# Patient Record
Sex: FEMALE | Race: WHITE | HISPANIC OR LATINO | Employment: PART TIME | URBAN - METROPOLITAN AREA
[De-identification: names, ages, dates, MRNs, and addresses within clinical notes are randomized per-mention and may not be internally consistent; named-entity substitution may affect disease eponyms.]

---
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
## 2022-04-23 ENCOUNTER — APPOINTMENT (EMERGENCY)
Dept: RADIOLOGY | Facility: HOSPITAL | Age: 22
End: 2022-04-23
Payer: COMMERCIAL

## 2022-04-23 ENCOUNTER — HOSPITAL ENCOUNTER (EMERGENCY)
Facility: HOSPITAL | Age: 22
Discharge: HOME | End: 2022-04-23
Attending: EMERGENCY MEDICINE | Admitting: EMERGENCY MEDICINE
Payer: COMMERCIAL

## 2022-04-23 VITALS
HEART RATE: 96 BPM | BODY MASS INDEX: 22.49 KG/M2 | OXYGEN SATURATION: 98 % | HEIGHT: 65 IN | RESPIRATION RATE: 16 BRPM | TEMPERATURE: 98 F | WEIGHT: 135 LBS | DIASTOLIC BLOOD PRESSURE: 66 MMHG | SYSTOLIC BLOOD PRESSURE: 120 MMHG

## 2022-04-23 DIAGNOSIS — J11.1 INFLUENZA: Primary | ICD-10-CM

## 2022-04-23 DIAGNOSIS — J45.909 ACUTE ASTHMATIC BRONCHITIS: ICD-10-CM

## 2022-04-23 LAB
EXT PREG TEST URINE: NEGATIVE
EXT. CONTROL ED NAV: NORMAL
FLUAV RNA RESP QL NAA+PROBE: POSITIVE
FLUBV RNA RESP QL NAA+PROBE: NEGATIVE
RSV RNA RESP QL NAA+PROBE: NEGATIVE
SARS-COV-2 RNA RESP QL NAA+PROBE: NEGATIVE

## 2022-04-23 PROCEDURE — 71045 X-RAY EXAM CHEST 1 VIEW: CPT

## 2022-04-23 PROCEDURE — 0241U HB NFCT DS VIR RESP RNA 4 TRGT: CPT | Performed by: STUDENT IN AN ORGANIZED HEALTH CARE EDUCATION/TRAINING PROGRAM

## 2022-04-23 PROCEDURE — 99284 EMERGENCY DEPT VISIT MOD MDM: CPT | Performed by: STUDENT IN AN ORGANIZED HEALTH CARE EDUCATION/TRAINING PROGRAM

## 2022-04-23 PROCEDURE — 81025 URINE PREGNANCY TEST: CPT | Performed by: STUDENT IN AN ORGANIZED HEALTH CARE EDUCATION/TRAINING PROGRAM

## 2022-04-23 PROCEDURE — 99284 EMERGENCY DEPT VISIT MOD MDM: CPT

## 2022-04-23 RX ORDER — ALBUTEROL SULFATE 2.5 MG/3ML
2.5 SOLUTION RESPIRATORY (INHALATION) EVERY 6 HOURS PRN
Qty: 75 ML | Refills: 0 | Status: SHIPPED | OUTPATIENT
Start: 2022-04-23

## 2022-04-23 RX ORDER — ALBUTEROL SULFATE 2.5 MG/3ML
2.5 SOLUTION RESPIRATORY (INHALATION) EVERY 6 HOURS PRN
Qty: 75 ML | Refills: 0 | Status: SHIPPED | OUTPATIENT
Start: 2022-04-23 | End: 2022-04-23 | Stop reason: SDUPTHER

## 2022-04-23 RX ORDER — IBUPROFEN 600 MG/1
600 TABLET ORAL ONCE
Status: COMPLETED | OUTPATIENT
Start: 2022-04-23 | End: 2022-04-23

## 2022-04-23 RX ORDER — ONDANSETRON 4 MG/1
4 TABLET, ORALLY DISINTEGRATING ORAL ONCE
Status: COMPLETED | OUTPATIENT
Start: 2022-04-23 | End: 2022-04-23

## 2022-04-23 RX ORDER — ONDANSETRON 4 MG/1
4 TABLET, ORALLY DISINTEGRATING ORAL EVERY 6 HOURS PRN
Qty: 5 TABLET | Refills: 0 | Status: SHIPPED | OUTPATIENT
Start: 2022-04-23 | End: 2022-04-26

## 2022-04-23 RX ADMIN — ONDANSETRON 4 MG: 4 TABLET, ORALLY DISINTEGRATING ORAL at 06:42

## 2022-04-23 RX ADMIN — IBUPROFEN 600 MG: 600 TABLET ORAL at 07:05

## 2022-04-23 NOTE — ED PROVIDER NOTES
History  Chief Complaint   Patient presents with    Flu Symptoms     Patient presents with vomiting and cough for the past few days, [facility] but she states that she does not feel better     Patient 51-year-old female presenting to the emergency department today with concern for cough, shortness of breath, vomiting  Patient's over the past few days she has been experiencing tightness in her chest and worsening cough, had been using her nebulizer and albuterol inhaler without symptomatic relief  She denies production of cough but states her chest is feeling increasingly tight  She also states over the past 4 hours she began with multiple episodes of vomiting, denies presence of blood or bile, having difficulty tolerating solids or liquids  She also admits to associated muscle and body aches and fevers with a T-max of 101°, denies taking any Tylenol or ibuprofen  Denies chest pain, lightheadedness or dizziness, sore throat, nasal congestion or rhinorrhea, abdominal pain, diarrhea, neck or back pain, headache  Prior to Admission Medications   Prescriptions Last Dose Informant Patient Reported? Taking? albuterol (2 5 mg/3 mL) 0 083 % nebulizer solution   No No   Sig: Take 1 vial (2 5 mg total) by nebulization every 6 (six) hours as needed for wheezing or shortness of breath   albuterol (2 5 mg/3 mL) 0 083 % nebulizer solution   No No   Sig: Take 3 mL (2 5 mg total) by nebulization every 6 (six) hours as needed for wheezing or shortness of breath   albuterol (PROVENTIL HFA,VENTOLIN HFA) 90 mcg/act inhaler   No No   Sig: Inhale 2 puffs every 4 (four) hours as needed for wheezing      Facility-Administered Medications: None       Past Medical History:   Diagnosis Date    Asthma        History reviewed  No pertinent surgical history  History reviewed  No pertinent family history  I have reviewed and agree with the history as documented      E-Cigarette/Vaping E-Cigarette/Vaping Substances     Social History     Tobacco Use    Smoking status: Current Every Day Smoker     Packs/day: 0 25    Smokeless tobacco: Never Used   Substance Use Topics    Alcohol use: Yes     Comment: socially    Drug use: Yes     Types: Marijuana       Review of Systems   Constitutional: Positive for fatigue  Negative for chills  HENT: Negative for congestion, rhinorrhea and sore throat  Respiratory: Positive for cough, chest tightness and shortness of breath  Cardiovascular: Negative for chest pain  Gastrointestinal: Positive for nausea and vomiting  Negative for abdominal pain and diarrhea  Genitourinary: Negative  Musculoskeletal: Positive for myalgias  Negative for back pain and neck pain  Neurological: Negative for dizziness, weakness, light-headedness, numbness and headaches  All other systems reviewed and are negative  Physical Exam  Physical Exam  Vitals and nursing note reviewed  Constitutional:       General: She is not in acute distress  Appearance: Normal appearance  She is well-developed  She is not ill-appearing  Comments: Well-appearing female, not acute distress   HENT:      Head: Normocephalic and atraumatic  Nose: Nose normal  No congestion or rhinorrhea  Mouth/Throat:      Mouth: Mucous membranes are moist       Pharynx: Oropharynx is clear  No oropharyngeal exudate or posterior oropharyngeal erythema  Tonsils: No tonsillar exudate or tonsillar abscesses  Eyes:      Extraocular Movements: Extraocular movements intact  Conjunctiva/sclera: Conjunctivae normal       Pupils: Pupils are equal, round, and reactive to light  Cardiovascular:      Rate and Rhythm: Normal rate and regular rhythm  Heart sounds: Normal heart sounds  No murmur heard  Pulmonary:      Effort: Pulmonary effort is normal  No respiratory distress  Breath sounds: Normal breath sounds     Abdominal:      General: Bowel sounds are normal       Palpations: Abdomen is soft  Tenderness: There is no abdominal tenderness  There is no guarding or rebound  Negative signs include Kingsley's sign and McBurney's sign  Musculoskeletal:      Cervical back: Neck supple  No tenderness  No pain with movement, spinous process tenderness or muscular tenderness  Lymphadenopathy:      Cervical: No cervical adenopathy  Skin:     General: Skin is warm and dry  Neurological:      General: No focal deficit present  Mental Status: She is alert and oriented to person, place, and time  Sensory: Sensation is intact  Motor: Motor function is intact  Psychiatric:         Attention and Perception: Attention normal          Mood and Affect: Mood normal          Speech: Speech normal          Behavior: Behavior normal  Behavior is cooperative  Thought Content:  Thought content normal          Judgment: Judgment normal          Vital Signs  ED Triage Vitals [04/23/22 0627]   Temperature Pulse Respirations Blood Pressure SpO2   98 °F (36 7 °C) 96 16 120/66 98 %      Temp Source Heart Rate Source Patient Position - Orthostatic VS BP Location FiO2 (%)   Oral Monitor Lying Left arm --      Pain Score       7           Vitals:    04/23/22 0627   BP: 120/66   Pulse: 96   Patient Position - Orthostatic VS: Lying         Visual Acuity      ED Medications  Medications   ibuprofen (MOTRIN) tablet 600 mg (600 mg Oral Given 4/23/22 0705)   ondansetron (ZOFRAN-ODT) dispersible tablet 4 mg (4 mg Oral Given 4/23/22 7421)       Diagnostic Studies  Results Reviewed     Procedure Component Value Units Date/Time    COVID/FLU/RSV - 2 hour TAT [[id number]]  (Abnormal) Collected: 04/23/22 0644    Lab Status: Final result Specimen: Nasopharyngeal Swab Updated: 04/23/22 0725     SARS-CoV-2 Negative     INFLUENZA A PCR Positive     INFLUENZA B PCR Negative     RSV PCR Negative    Narrative:      FOR PEDIATRIC PATIENTS - copy/paste COVID Guidelines URL to browser: [email or web address]  [email or web address]    SARS-CoV-2 assay is a Nucleic Acid Amplification assay intended for the  qualitative detection of nucleic acid from SARS-CoV-2 in nasopharyngeal  swabs  Results are for the presumptive identification of SARS-CoV-2 RNA  Positive results are indicative of infection with SARS-CoV-2, the virus  causing COVID-19, but do not rule out bacterial infection or co-infection  with other viruses  Laboratories within the [place] and its  territories are required to report all positive results to the appropriate  public health authorities  Negative results do not preclude SARS-CoV-2  infection and should not be used as the sole basis for treatment or other  patient management decisions  Negative results must be combined with  clinical observations, patient history, and epidemiological information  This test has not been FDA cleared or approved  This test has been authorized by FDA under an Emergency Use Authorization  (EUA)  This test is only authorized for the duration of time the  declaration that circumstances exist justifying the authorization of the  emergency use of an in vitro diagnostic tests for detection of SARS-CoV-2  virus and/or diagnosis of COVID-19 infection under section 564(b)(1) of  the Act, 21 U  S C  016EBY-9(T)(9), unless the authorization is terminated  or revoked sooner  The test has been validated but independent review by FDA  and CLIA is pending  Test performed using [employer] Gene[employer]pert: This RT-PCR assay targets N2,  a region unique to SARS-CoV-2  A conserved region in the E-gene was chosen  for pan-Sarbecovirus detection which includes SARS-CoV-2      POCT pregnancy, urine [[id number]]  (Normal) Resulted: 04/23/22 0649    Lab Status: Final result Updated: 04/23/22 0649     EXT PREG TEST UR (Ref: Negative) Negative     Control Valid                 XR chest 1 view portable   ED Interpretation by Collette Dumont PA-C (04/23 7354)   No acute cardiopulmonary abnormalities                 Procedures  Procedures         ED Course                                             MDM  Number of Diagnoses or Management Options  Influenza  Diagnosis management comments: Patient 75-year-old female presents emergency department today with complaint of cough, shortness of breath, vomiting  Examination was unremarkable, lungs are clear to auscultation bilaterally without wheezing, heart was regular rate and rhythm, abdomen is soft and nontender  COVID/flu/RSV testing was ordered in the setting of global pandemic and due to patient's presenting symptoms was positive for influenza A  Patient medicated with Zofran for nausea and ibuprofen for symptomatic relief as patient cannot take Tylenol  Urine preg test ordered and was negative  Chest x-ray ordered due to patient's complaint of cough and chest tightness/shortness of breath to rule out pneumonia and showed no acute cardiopulmonary abnormalities  Patient advised follow-up primary care within the next week for re-evaluation return emergency department any new or worsening symptoms as discussed with the patient  Patient verbalized understanding and agreement plan of care, all patient's questions were answered, patient stable at time of discharge         Amount and/or Complexity of Data Reviewed  Clinical lab tests: ordered  Tests in the radiology section of CPT®: ordered and reviewed  Independent visualization of images, tracings, or specimens: yes    Patient Progress  Patient progress: stable      Disposition  Final diagnoses:   Influenza     Time reflects when diagnosis was documented in both MDM as applicable and the Disposition within this note     Time User Action Codes Description Comment    4/23/2022  7:28 AM Kashif Keith [J11 1] Influenza     4/23/2022  7:28 SHANA Keith [J45 909] Acute asthmatic bronchitis       ED Disposition     ED Disposition Condition Date/Time Comment    Discharge Stable Sat Apr 23, 2022  7:28 AM Kranthi Guerra discharge to home/self care  Follow-up Information     Follow up With Specialties Details Why Contact Info Additional Information    [facility] [address] [facility]   [address]  [address] [facility] [address]  [facility] [address] [facility], [address], [address], [address] [facility]  Emergency Department Emergency Medicine   [address] [facility],[address] [address]  [facility] Emergency Department, [address], [place] [address]          Patient's Medications   Discharge Prescriptions    ONDANSETRON (ZOFRAN ODT) 4 MG DISINTEGRATING TABLET    Take 1 tablet (4 mg total) by mouth every 6 (six) hours as needed for nausea or vomiting for up to 3 days       Start Date: 4/23/2022 End Date: 4/26/2022       Order Dose: 4 mg       Quantity: 5 tablet    Refills: 0       No discharge procedures on file      PDMP Review     None          ED Provider  Electronically Signed by           Josué James PA-C  04/23/22 0894

## 2022-04-23 NOTE — DISCHARGE INSTRUCTIONS
May use Zofran every 6 hours as needed for nausea  Continue ibuprofen every 6 hours as needed for symptomatic relief and fever reduction  Follow-up with primary care provider within next week for re-evaluation as needed  Return emergency department any new or worsening symptoms including fevers greater than 103 that were not responding to buprofen, chest pain, worsening shortness of breath, lightheadedness or dizziness, passing out, uncontrolled nausea or vomiting, vomiting blood

## 2022-04-24 ENCOUNTER — HOSPITAL ENCOUNTER (EMERGENCY)
Facility: HOSPITAL | Age: 22
Discharge: HOME | End: 2022-04-24
Attending: EMERGENCY MEDICINE | Admitting: EMERGENCY MEDICINE
Payer: COMMERCIAL

## 2022-04-24 ENCOUNTER — APPOINTMENT (EMERGENCY)
Dept: RADIOLOGY | Facility: HOSPITAL | Age: 22
End: 2022-04-24
Payer: COMMERCIAL

## 2022-04-24 VITALS
WEIGHT: 135 LBS | SYSTOLIC BLOOD PRESSURE: 107 MMHG | DIASTOLIC BLOOD PRESSURE: 69 MMHG | RESPIRATION RATE: 16 BRPM | TEMPERATURE: 99.5 F | BODY MASS INDEX: 22.47 KG/M2 | HEART RATE: 101 BPM

## 2022-04-24 DIAGNOSIS — R11.10 VOMITING: ICD-10-CM

## 2022-04-24 DIAGNOSIS — E86.0 DEHYDRATION: ICD-10-CM

## 2022-04-24 DIAGNOSIS — J11.1 INFLUENZA: ICD-10-CM

## 2022-04-24 DIAGNOSIS — J45.909 ASTHMA: Primary | ICD-10-CM

## 2022-04-24 LAB
ALBUMIN SERPL BCP-MCNC: 4.9 G/DL (ref 3.5–5)
ALP SERPL-CCNC: 51 U/L (ref 34–104)
ALT SERPL W P-5'-P-CCNC: 25 U/L (ref 7–52)
ANION GAP SERPL CALCULATED.3IONS-SCNC: 11 MMOL/L (ref 4–13)
AST SERPL W P-5'-P-CCNC: 32 U/L (ref 13–39)
BACTERIA UR QL AUTO: ABNORMAL /HPF
BASOPHILS # BLD AUTO: 0.03 THOUSANDS/ΜL (ref 0–0.1)
BASOPHILS NFR BLD AUTO: 1 % (ref 0–1)
BILIRUB SERPL-MCNC: 0.26 MG/DL (ref 0.2–1)
BILIRUB UR QL STRIP: ABNORMAL
BUN SERPL-MCNC: 9 MG/DL (ref 5–25)
CALCIUM SERPL-MCNC: 9.4 MG/DL (ref 8.4–10.2)
CHLORIDE SERPL-SCNC: 100 MMOL/L (ref 96–108)
CLARITY UR: ABNORMAL
CO2 SERPL-SCNC: 24 MMOL/L (ref 21–32)
COLOR UR: YELLOW
CREAT SERPL-MCNC: 0.7 MG/DL (ref 0.6–1.3)
EOSINOPHIL # BLD AUTO: 0.01 THOUSAND/ΜL (ref 0–0.61)
EOSINOPHIL NFR BLD AUTO: 0 % (ref 0–6)
ERYTHROCYTE [DISTWIDTH] IN BLOOD BY AUTOMATED COUNT: 13.7 % (ref 11.6–15.1)
EXT PREG TEST URINE: NEGATIVE
EXT. CONTROL ED NAV: NORMAL
GFR SERPL CREATININE-BSD FRML MDRD: 124 ML/MIN/1.73SQ M
GLUCOSE SERPL-MCNC: 83 MG/DL (ref 65–140)
GLUCOSE UR STRIP-MCNC: NEGATIVE MG/DL
HCT VFR BLD AUTO: 42.4 % (ref 34.8–46.1)
HGB BLD-MCNC: 14.1 G/DL (ref 11.5–15.4)
HGB UR QL STRIP.AUTO: ABNORMAL
IMM GRANULOCYTES # BLD AUTO: 0.02 THOUSAND/UL (ref 0–0.2)
IMM GRANULOCYTES NFR BLD AUTO: 0 % (ref 0–2)
KETONES UR STRIP-MCNC: ABNORMAL MG/DL
LEUKOCYTE ESTERASE UR QL STRIP: NEGATIVE
LIPASE SERPL-CCNC: 16 U/L (ref 11–82)
LYMPHOCYTES # BLD AUTO: 0.71 THOUSANDS/ΜL (ref 0.6–4.47)
LYMPHOCYTES NFR BLD AUTO: 11 % (ref 14–44)
MCH RBC QN AUTO: 28.3 PG (ref 26.8–34.3)
MCHC RBC AUTO-ENTMCNC: 33.3 G/DL (ref 31.4–37.4)
MCV RBC AUTO: 85 FL (ref 82–98)
MONOCYTES # BLD AUTO: 0.62 THOUSAND/ΜL (ref 0.17–1.22)
MONOCYTES NFR BLD AUTO: 9 % (ref 4–12)
MUCOUS THREADS UR QL AUTO: ABNORMAL
NEUTROPHILS # BLD AUTO: 5.18 THOUSANDS/ΜL (ref 1.85–7.62)
NEUTS SEG NFR BLD AUTO: 79 % (ref 43–75)
NITRITE UR QL STRIP: NEGATIVE
NON-SQ EPI CELLS URNS QL MICRO: ABNORMAL /HPF
NRBC BLD AUTO-RTO: 0 /100 WBCS
PH UR STRIP.AUTO: 6 [PH]
PLATELET # BLD AUTO: 218 THOUSANDS/UL (ref 149–390)
PMV BLD AUTO: 9.7 FL (ref 8.9–12.7)
POTASSIUM SERPL-SCNC: 3.6 MMOL/L (ref 3.5–5.3)
PROT SERPL-MCNC: 8.1 G/DL (ref 6.4–8.4)
PROT UR STRIP-MCNC: ABNORMAL MG/DL
RBC # BLD AUTO: 4.98 MILLION/UL (ref 3.81–5.12)
RBC #/AREA URNS AUTO: ABNORMAL /HPF
SODIUM SERPL-SCNC: 135 MMOL/L (ref 135–147)
SP GR UR STRIP.AUTO: >=1.03 (ref 1–1.03)
UROBILINOGEN UR QL STRIP.AUTO: 0.2 E.U./DL
WBC # BLD AUTO: 6.57 THOUSAND/UL (ref 4.31–10.16)
WBC #/AREA URNS AUTO: ABNORMAL /HPF

## 2022-04-24 PROCEDURE — 83690 ASSAY OF LIPASE: CPT | Performed by: EMERGENCY MEDICINE

## 2022-04-24 PROCEDURE — 96374 THER/PROPH/DIAG INJ IV PUSH: CPT

## 2022-04-24 PROCEDURE — 36415 COLL VENOUS BLD VENIPUNCTURE: CPT | Performed by: EMERGENCY MEDICINE

## 2022-04-24 PROCEDURE — 85025 COMPLETE CBC W/AUTO DIFF WBC: CPT | Performed by: EMERGENCY MEDICINE

## 2022-04-24 PROCEDURE — 99284 EMERGENCY DEPT VISIT MOD MDM: CPT | Performed by: EMERGENCY MEDICINE

## 2022-04-24 PROCEDURE — 94640 AIRWAY INHALATION TREATMENT: CPT

## 2022-04-24 PROCEDURE — 99284 EMERGENCY DEPT VISIT MOD MDM: CPT

## 2022-04-24 PROCEDURE — 81003 URINALYSIS AUTO W/O SCOPE: CPT | Performed by: EMERGENCY MEDICINE

## 2022-04-24 PROCEDURE — 96361 HYDRATE IV INFUSION ADD-ON: CPT

## 2022-04-24 PROCEDURE — 81025 URINE PREGNANCY TEST: CPT | Performed by: EMERGENCY MEDICINE

## 2022-04-24 PROCEDURE — 71045 X-RAY EXAM CHEST 1 VIEW: CPT

## 2022-04-24 PROCEDURE — 81001 URINALYSIS AUTO W/SCOPE: CPT | Performed by: EMERGENCY MEDICINE

## 2022-04-24 PROCEDURE — 80053 COMPREHEN METABOLIC PANEL: CPT | Performed by: EMERGENCY MEDICINE

## 2022-04-24 RX ORDER — PREDNISONE 20 MG/1
40 TABLET ORAL ONCE
Status: COMPLETED | OUTPATIENT
Start: 2022-04-24 | End: 2022-04-24

## 2022-04-24 RX ORDER — METHYLPREDNISOLONE 4 MG/1
TABLET ORAL
Qty: 21 TABLET | Refills: 0 | Status: SHIPPED | OUTPATIENT
Start: 2022-04-24

## 2022-04-24 RX ORDER — ALBUTEROL SULFATE 2.5 MG/3ML
2.5 SOLUTION RESPIRATORY (INHALATION) ONCE
Status: COMPLETED | OUTPATIENT
Start: 2022-04-24 | End: 2022-04-24

## 2022-04-24 RX ORDER — ONDANSETRON 2 MG/ML
4 INJECTION INTRAMUSCULAR; INTRAVENOUS ONCE
Status: COMPLETED | OUTPATIENT
Start: 2022-04-24 | End: 2022-04-24

## 2022-04-24 RX ORDER — ONDANSETRON 4 MG/1
4 TABLET, FILM COATED ORAL EVERY 6 HOURS
Qty: 12 TABLET | Refills: 0 | Status: SHIPPED | OUTPATIENT
Start: 2022-04-24

## 2022-04-24 RX ADMIN — SODIUM CHLORIDE 1000 ML: 0.9 INJECTION, SOLUTION INTRAVENOUS at 10:06

## 2022-04-24 RX ADMIN — SODIUM CHLORIDE 1000 ML: 0.9 INJECTION, SOLUTION INTRAVENOUS at 09:12

## 2022-04-24 RX ADMIN — ALBUTEROL SULFATE 2.5 MG: 2.5 SOLUTION RESPIRATORY (INHALATION) at 10:32

## 2022-04-24 RX ADMIN — ONDANSETRON 4 MG: 2 INJECTION INTRAMUSCULAR; INTRAVENOUS at 09:18

## 2022-04-24 RX ADMIN — PREDNISONE 40 MG: 20 TABLET ORAL at 10:05

## 2022-04-24 NOTE — ED NOTES
Patient states that she feels like she is wheezing  Requesting a nebulizer treatment  MD  Made aware        [address] Chetan Romero, RN  04/24/22 7811

## 2022-04-24 NOTE — ED NOTES
Discharge instructions reviewed with pt  Pt verbalized understanding  And has no further questions at this time       Ciro Lizarraga RN  04/24/22 1039

## 2022-04-24 NOTE — DISCHARGE INSTRUCTIONS
Return to er with return of symptoms, concern for return of dehydration, if you have worse ing asthma symptoms or new symptoms  See pcp this week for follow up and repeat evaluation

## 2022-04-24 NOTE — ED PROVIDER NOTES
History  Chief Complaint   Patient presents with    Vomiting     pt presents to ED with c/o persistant vomiting since being dx with flu 5 days ago, pt concerned as she can't keep anything down  To er with flu sx for 5 days, was more resp and coughing and now more persistent vomiting  She states she was seen 2 days ago, dx with influenza  At that time her asthma was flairing, she was using q 4-6 hr nebs at home  From a resp standpoint she reported she is improved but states she does feel her asthma is still flairing some  No cp  No legs welling, orthopnea is reported  She states she has been vomiting for the last 3 days, difficulty tolerating po  abd pain is reported, seems to [facility] and  and is reported as cramps  Denies localized and constant pain to her abd  No bld in V  No D  Prior to Admission Medications   Prescriptions Last Dose Informant Patient Reported? Taking? albuterol (2 5 mg/3 mL) 0 083 % nebulizer solution   No No   Sig: Take 3 mL (2 5 mg total) by nebulization every 6 (six) hours as needed for wheezing or shortness of breath   albuterol (PROVENTIL HFA,VENTOLIN HFA) 90 mcg/act inhaler   No No   Sig: Inhale 2 puffs every 4 (four) hours as needed for wheezing   ondansetron (Zofran ODT) 4 mg disintegrating tablet   No No   Sig: Take 1 tablet (4 mg total) by mouth every 6 (six) hours as needed for nausea or vomiting for up to 3 days      Facility-Administered Medications: None       Past Medical History:   Diagnosis Date    Asthma        History reviewed  No pertinent surgical history  History reviewed  No pertinent family history  I have reviewed and agree with the history as documented      E-Cigarette/Vaping     E-Cigarette/Vaping Substances     Social History     Tobacco Use    Smoking status: Current Every Day Smoker     Packs/day: 0 25    Smokeless tobacco: Never Used   Substance Use Topics    Alcohol use: Yes     Comment: socially    Drug use: Yes     Types: Marijuana Review of Systems   All other systems reviewed and are negative  Physical Exam  Physical Exam  Constitutional:       General: She is not in acute distress  Appearance: Normal appearance  She is well-developed  She is not ill-appearing, toxic-appearing or diaphoretic  HENT:      Head: Normocephalic and atraumatic  Right Ear: External ear normal       Left Ear: External ear normal       Mouth/Throat:      Mouth: Mucous membranes are dry  Pharynx: Oropharynx is clear  No oropharyngeal exudate or posterior oropharyngeal erythema  Eyes:      General:         Right eye: No discharge  Left eye: No discharge  Pupils: Pupils are equal, round, and reactive to light  Neck:      Vascular: No JVD  Cardiovascular:      Rate and Rhythm: Normal rate and regular rhythm  Pulses: Normal pulses  Heart sounds: Normal heart sounds  No murmur heard  No friction rub  No gallop  Pulmonary:      Effort: Pulmonary effort is normal  No respiratory distress  Breath sounds: No stridor  Wheezing present  No rhonchi or rales  Comments: bl diminished with bl wheezing  Chest:      Chest wall: No tenderness  Abdominal:      General: Abdomen is flat  Bowel sounds are normal  There is no distension  Palpations: Abdomen is soft  There is no mass  Tenderness: There is no abdominal tenderness  There is no guarding or rebound  Hernia: No hernia is present  Musculoskeletal:         General: No swelling, tenderness, deformity or signs of injury  Normal range of motion  Cervical back: Normal range of motion and neck supple  Right lower leg: No edema  Left lower leg: No edema  Skin:     General: Skin is warm  Capillary Refill: Capillary refill takes less than 2 seconds  Coloration: Skin is not jaundiced or pale  Findings: No bruising, erythema, lesion or rash  Neurological:      General: No focal deficit present        Mental Status: She is alert and oriented to person, place, and time  Cranial Nerves: No cranial nerve deficit  Sensory: No sensory deficit  Motor: No weakness or abnormal muscle tone        Coordination: Coordination normal       Gait: Gait normal       Deep Tendon Reflexes: Reflexes normal    Psychiatric:         Mood and Affect: Mood normal          Vital Signs  ED Triage Vitals   Temperature Pulse Respirations Blood Pressure SpO2   04/24/22 0858 04/24/22 0900 04/24/22 0858 04/24/22 0900 --   99 3 °F (37 4 °C) 101 16 107/69       Temp Source Heart Rate Source Patient Position - Orthostatic VS BP Location FiO2 (%)   04/24/22 0858 -- 04/24/22 0858 04/24/22 0858 --   Oral  Lying Left arm       Pain Score       --                  Vitals:    04/24/22 0858 04/24/22 0900   BP:  107/69   Pulse:  101   Patient Position - Orthostatic VS: Lying          Visual Acuity      ED Medications  Medications   sodium chloride 0 9 % bolus 1,000 mL (0 mL Intravenous Stopped 4/24/22 1023)   ondansetron (ZOFRAN) injection 4 mg (4 mg Intravenous Given 4/24/22 0918)   predniSONE tablet 40 mg (40 mg Oral Given 4/24/22 1005)   sodium chloride 0 9 % bolus 1,000 mL (0 mL Intravenous Stopped 4/24/22 1119)   albuterol inhalation solution 2 5 mg (2 5 mg Nebulization Given 4/24/22 1032)       Diagnostic Studies  Results Reviewed     Procedure Component Value Units Date/Time    POCT pregnancy, urine [[id number]]  (Normal) Resulted: 04/24/22 0916    Lab Status: Final result Updated: 04/24/22 1008     EXT PREG TEST UR (Ref: Negative) negative     Control valid    Comprehensive metabolic panel [[id number]] Collected: 04/24/22 0912    Lab Status: Final result Specimen: Blood from Arm, Right Updated: 04/24/22 0937     Sodium 135 mmol/L      Potassium 3 6 mmol/L      Chloride 100 mmol/L      CO2 24 mmol/L      ANION GAP 11 mmol/L      BUN 9 mg/dL      Creatinine 0 70 mg/dL      Glucose 83 mg/dL      Calcium 9 4 mg/dL      AST 32 U/L      ALT 25 U/L Alkaline Phosphatase 51 U/L      Total Protein 8 1 g/dL      Albumin 4 9 g/dL      Total Bilirubin 0 26 mg/dL      eGFR 124 ml/min/1 73sq m     Narrative:      [place] guidelines for Chronic Kidney Disease (CKD):     Stage 1 with normal or high GFR (GFR > 90 mL/min/1 73 square meters)    Stage 2 Mild CKD (GFR = 60-89 mL/min/1 73 square meters)    Stage 3A Moderate CKD (GFR = 45-59 mL/min/1 73 square meters)    Stage 3B Moderate CKD (GFR = 30-44 mL/min/1 73 square meters)    Stage 4 Severe CKD (GFR = 15-29 mL/min/1 73 square meters)    Stage 5 End Stage CKD (GFR <15 mL/min/1 73 square meters)  Note: GFR calculation is accurate only with a steady state creatinine    Lipase [[id number]]  (Normal) Collected: 04/24/22 0912    Lab Status: Final result Specimen: Blood from Arm, Right Updated: 04/24/22 0937     Lipase 16 u/L     Urine Microscopic [[id number]]  (Abnormal) Collected: 04/24/22 0909    Lab Status: Final result Specimen: Urine, Clean Catch Updated: 04/24/22 0929     RBC, UA 0-1 /hpf      WBC, UA None Seen /hpf      Epithelial Cells Occasional /hpf      Bacteria, UA Occasional /hpf      MUCUS THREADS Moderate    CBC and differential [[id number]]  (Abnormal) Collected: 04/24/22 0912    Lab Status: Final result Specimen: Blood from Arm, Right Updated: 04/24/22 0920     WBC 6 57 Thousand/uL      RBC 4 98 Million/uL      Hemoglobin 14 1 g/dL      Hematocrit 42 4 %      MCV 85 fL      MCH 28 3 pg      MCHC 33 3 g/dL      RDW 13 7 %      MPV 9 7 fL      Platelets 942 Thousands/uL      nRBC 0 /100 WBCs      Neutrophils Relative 79 %      Immat GRANS % 0 %      Lymphocytes Relative 11 %      Monocytes Relative 9 %      Eosinophils Relative 0 %      Basophils Relative 1 %      Neutrophils Absolute 5 18 Thousands/µL      Immature Grans Absolute 0 02 Thousand/uL      Lymphocytes Absolute 0 71 Thousands/µL      Monocytes Absolute 0 62 Thousand/µL      Eosinophils Absolute 0 01 Thousand/µL Basophils Absolute 0 03 Thousands/µL     Narrative: This is an appended report  These results have been appended to a previously verified report  UA w Reflex to Microscopic w Reflex to Culture [[id number]]  (Abnormal) Collected: 04/24/22 0909    Lab Status: Final result Specimen: Urine, Clean Catch Updated: 04/24/22 0916     Color, UA Yellow     Clarity, UA Slightly Cloudy     Specific Gravity, UA >=1 030     pH, UA 6 0     Leukocytes, UA Negative     Nitrite, UA Negative     Protein, UA 1+ mg/dl      Glucose, UA Negative mg/dl      Ketones, UA 80 (3+) mg/dl      Urobilinogen, UA 0 2 E U /dl      Bilirubin, UA 1+     Blood, UA 2+                 XR chest 1 view portable   Final Result by Shaquille Fong MD (04/24 1043)      No acute cardiopulmonary disease  Workstation performed: [id number]                    Procedures  Procedures         ED Course                               SBIRT 22yo+      Most Recent Value   SBIRT (22 yo +)    In order to provide better care to our patients, we are screening all of our patients for alcohol and drug use  Would it be okay to ask you these screening questions? Yes Filed at: 04/24/2022 0269   Initial Alcohol Screen: US AUDIT-C     1  How often do you have a drink containing alcohol? 0 Filed at: 04/24/2022 0919   2  How many drinks containing alcohol do you have on a typical day you are drinking? 0 Filed at: 04/24/2022 0919   3a  Male UNDER 65: How often do you have five or more drinks on one occasion? 0 Filed at: 04/24/2022 0919   3b  FEMALE Any Age, or MALE 65+: How often do you have 4 or more drinks on one occassion? 0 Filed at: 04/24/2022 0919   Audit-C Score 0 Filed at: 04/24/2022 9800   CAROLANN: How many times in the past year have you    Used an illegal drug or used a prescription medication for non-medical reasons? Never Filed at: 04/24/2022 0919                    MDM  Number of Diagnoses or Management Options  Diagnosis management comments:  To er with known flu and concern for vomiting  In er she does appear a bit dry, I have started ivf x 2 L, given Zofran and she reported she did feel better  She is drinking in er at this point, has not vomited in er  abd remains soft and NT  Lungs are with wheezing, she has requested a breathing tx in er  Hx of asthma  Will start steroids, she has plenty of alb at home she reported  She will fu with pcp, return with worsening sx or red flags sx  I have discussed all red flags, need for fu and when to return to er and she has voiced understanding  Disposition  Final diagnoses:   Asthma   Influenza   Vomiting   Dehydration     Time reflects when diagnosis was documented in both MDM as applicable and the Disposition within this note     Time User Action Codes Description Comment    4/24/2022 10:47 AM Kyree Fears [O65 396] Asthma     4/24/2022 10:47 AM Luis Cralos Haga Add [J11 1] Influenza     4/24/2022 10:47 AM Luis Carlos Hacker Add [R11 10] Vomiting     4/24/2022 10:47 AM Luis Carlos Hacker Add [E86 0] Dehydration       ED Disposition     ED Disposition Condition Date/Time Comment    Discharge Stable Sun Apr 24, 2022 11:02 AM Bronson Martinez discharge to home/self care              Follow-up Information     Follow up With Specialties Details Why Contact Info Additional [id number]  Emergency Department Emergency Medicine Schedule an appointment as soon as possible for a visit in 3 days  [address] [facility],[address] [id number]  [address] [facility] Emergency Department, [address]          Discharge Medication List as of 4/24/2022 11:05 AM      START taking these medications    Details   methylPREDNISolone 4 MG tablet therapy pack Use as directed on package, Normal      ondansetron (ZOFRAN) 4 mg tablet Take 1 tablet (4 mg total) by mouth every 6 (six) hours, Starting Sun 4/24/2022, Normal         CONTINUE these medications which have NOT CHANGED    Details   albuterol (2 5 mg/3 mL) 0 083 % nebulizer solution Take 3 mL (2 5 mg total) by nebulization every 6 (six) hours as needed for wheezing or shortness of breath, Starting Sat 4/23/2022, Normal      albuterol (PROVENTIL HFA,VENTOLIN HFA) 90 mcg/act inhaler Inhale 2 puffs every 4 (four) hours as needed for wheezing, Starting Fri 12/6/2019, Print      ondansetron (Zofran ODT) 4 mg disintegrating tablet Take 1 tablet (4 mg total) by mouth every 6 (six) hours as needed for nausea or vomiting for up to 3 days, Starting Sat 4/23/2022, Until Tue 4/26/2022 at 2359, Normal             No discharge procedures on file      PDMP Review     None          ED Provider  Electronically Signed by           Lencho Carrizales MD  04/24/22 4243

## 2022-10-29 ENCOUNTER — HOSPITAL ENCOUNTER (EMERGENCY)
Facility: HOSPITAL | Age: 22
Discharge: HOME | End: 2022-10-29
Attending: EMERGENCY MEDICINE

## 2022-10-29 VITALS
BODY MASS INDEX: 19.48 KG/M2 | DIASTOLIC BLOOD PRESSURE: 54 MMHG | TEMPERATURE: 97.3 F | SYSTOLIC BLOOD PRESSURE: 93 MMHG | RESPIRATION RATE: 18 BRPM | HEART RATE: 72 BPM | WEIGHT: 117.06 LBS | OXYGEN SATURATION: 97 %

## 2022-10-29 DIAGNOSIS — Z04.41 ENCOUNTER FOR EXAMINATION FOLLOWING ALLEGED RAPE IN ADULT: Primary | ICD-10-CM

## 2022-10-29 DIAGNOSIS — R45.1 AGITATION: ICD-10-CM

## 2022-10-29 RX ORDER — MIDAZOLAM HYDROCHLORIDE 2 MG/2ML
4 INJECTION, SOLUTION INTRAMUSCULAR; INTRAVENOUS ONCE
Status: DISCONTINUED | OUTPATIENT
Start: 2022-10-29 | End: 2022-10-29

## 2022-10-29 RX ORDER — KETAMINE HYDROCHLORIDE 50 MG/ML
4 INJECTION, SOLUTION, CONCENTRATE INTRAMUSCULAR; INTRAVENOUS ONCE
Status: DISCONTINUED | OUTPATIENT
Start: 2022-10-29 | End: 2022-10-29 | Stop reason: HOSPADM

## 2022-10-29 RX ORDER — OLANZAPINE 10 MG/1
10 INJECTION, POWDER, LYOPHILIZED, FOR SOLUTION INTRAMUSCULAR ONCE
Status: COMPLETED | OUTPATIENT
Start: 2022-10-29 | End: 2022-10-29

## 2022-10-29 RX ORDER — ONDANSETRON 4 MG/1
4 TABLET, ORALLY DISINTEGRATING ORAL ONCE
Status: COMPLETED | OUTPATIENT
Start: 2022-10-29 | End: 2022-10-29

## 2022-10-29 RX ORDER — MIDAZOLAM HYDROCHLORIDE 2 MG/2ML
4 INJECTION, SOLUTION INTRAMUSCULAR; INTRAVENOUS ONCE
Status: COMPLETED | OUTPATIENT
Start: 2022-10-29 | End: 2022-10-29

## 2022-10-29 RX ORDER — MIDAZOLAM HYDROCHLORIDE 1 MG/ML
INJECTION, SOLUTION INTRAMUSCULAR; INTRAVENOUS
Status: DISCONTINUED
Start: 2022-10-29 | End: 2022-10-29 | Stop reason: HOSPADM

## 2022-10-29 RX ORDER — WATER 1000 ML/1000ML
INJECTION, SOLUTION INTRAVENOUS
Status: DISCONTINUED
Start: 2022-10-29 | End: 2022-10-29 | Stop reason: HOSPADM

## 2022-10-29 RX ADMIN — ONDANSETRON 4 MG: 4 TABLET, ORALLY DISINTEGRATING ORAL at 10:14

## 2022-10-29 RX ADMIN — MIDAZOLAM 4 MG: 1 INJECTION INTRAMUSCULAR; INTRAVENOUS at 05:40

## 2022-10-29 RX ADMIN — OLANZAPINE 10 MG: 10 INJECTION, POWDER, LYOPHILIZED, FOR SOLUTION INTRAMUSCULAR at 05:45
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
## 2022-10-29 NOTE — ED NOTES
Pt awake and yelling at staff, stating "Im tied up like a dog," refusing to listen why she is in restraints, will not follow commands, very aggressive towards staff and family, upset she is restrained, yelling she wants to be discharged, Basilio Haji at beside     Terrell Mcgowan RN  10/29/22 6721

## 2022-10-30 ENCOUNTER — HOSPITAL ENCOUNTER (EMERGENCY)
Facility: HOSPITAL | Age: 22
Discharge: HOME | End: 2022-10-30
Attending: EMERGENCY MEDICINE

## 2022-10-30 VITALS
DIASTOLIC BLOOD PRESSURE: 54 MMHG | TEMPERATURE: 98.2 F | SYSTOLIC BLOOD PRESSURE: 100 MMHG | OXYGEN SATURATION: 100 % | HEART RATE: 66 BPM | RESPIRATION RATE: 18 BRPM

## 2022-10-30 DIAGNOSIS — T74.21XA SEXUAL ASSAULT OF ADULT, INITIAL ENCOUNTER: Primary | ICD-10-CM

## 2022-10-30 LAB
EXT PREG TEST URINE: NEGATIVE
EXT. CONTROL ED NAV: NORMAL

## 2022-10-30 RX ORDER — IBUPROFEN 600 MG/1
600 TABLET ORAL ONCE
Status: COMPLETED | OUTPATIENT
Start: 2022-10-30 | End: 2022-10-30

## 2022-10-30 RX ORDER — LEVONORGESTREL 1.5 MG/1
1.5 TABLET ORAL ONCE
Status: COMPLETED | OUTPATIENT
Start: 2022-10-30 | End: 2022-10-30

## 2022-10-30 RX ORDER — METRONIDAZOLE 500 MG/1
2000 TABLET ORAL ONCE
Status: COMPLETED | OUTPATIENT
Start: 2022-10-30 | End: 2022-10-30

## 2022-10-30 RX ORDER — DOXYCYCLINE HYCLATE 100 MG/1
100 CAPSULE ORAL ONCE
Status: COMPLETED | OUTPATIENT
Start: 2022-10-30 | End: 2022-10-30

## 2022-10-30 RX ORDER — DOXYCYCLINE HYCLATE 100 MG/1
100 CAPSULE ORAL ONCE
Status: DISCONTINUED | OUTPATIENT
Start: 2022-10-30 | End: 2022-10-30

## 2022-10-30 RX ADMIN — DOXYCYCLINE 100 MG: 100 CAPSULE ORAL at 14:11

## 2022-10-30 RX ADMIN — METRONIDAZOLE 2000 MG: 500 TABLET ORAL at 14:12

## 2022-10-30 RX ADMIN — CEFTRIAXONE 500 MG: 1 INJECTION, POWDER, FOR SOLUTION INTRAMUSCULAR; INTRAVENOUS at 14:14

## 2022-10-30 RX ADMIN — IBUPROFEN 600 MG: 600 TABLET, FILM COATED ORAL at 10:48

## 2022-10-30 RX ADMIN — LEVONORGESTREL 1.5 MG: 1.5 TABLET ORAL at 14:13

## 2022-10-30 RX ADMIN — Medication 1 BOTTLE: at 14:35
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
## 2022-10-30 NOTE — DISCHARGE INSTRUCTIONS
Please take the provided medication as prescribed for continued treatment of potential infections  Please return to the emergency department if you experience worsening of symptoms that include but are not limited to: Loss of consciousness, fevers, chills, increased vaginal pain, increased vaginal discharge, nausea, vomiting, changes in vision, changes in hearing, or chest pain

## 2022-10-30 NOTE — ED NOTES
Spoke w/ [place] [employer] - Officer to retrieve kit this afternoon from middle shift     Katie Hall, RN  10/30/22 6336

## 2022-10-30 NOTE — ED NOTES
D/c reviewed with pt, pt verbalized understanding and has no further questions at this time  Pt provided with 7 day supply 100 mg Doxycycline sent from Pharmacy        Gloria Lr RN  10/30/22 0227

## 2022-10-30 NOTE — ED NOTES
Kit, interview packet and clothing placed in evidence locker with security        Gisselle Valverde RN  10/30/22 7210

## 2022-10-31 NOTE — NURSING NOTE
Provider notified Myself as the ON CALL HUSAM RN  I requested to speak to the patient, when provider took the phone into the room, the patient would not speak to myself  She was not agreeable for the exam at this time  I told the provider to let her rest and consult me later when patient more awake  I explained that the patient has 3 days to have an exam done  I told provider I was on call til 11am and to reach out if she became agreeable

## 2022-10-31 NOTE — NURSING NOTE
We re-consulted for a SANE exam for this patient  Again, I requested to speak to the patient and explain my services and time frame of my arrival  I was told patient was given medications prior to my consult and was agreeable tot he exam  After further questioning, the patient received zyprexa and versed  The patient was snoring and fall asleep during our brief 3 minute phone call  She was not able to stay awake during our conversation and therefore could not give consent  Exam deferred to later time  Instructed provider again that patient has 72 hours from the time of incident to have exam done  HUSAM CC made aware of situation

## 2022-12-29 ENCOUNTER — HOSPITAL ENCOUNTER (EMERGENCY)
Facility: HOSPITAL | Age: 22
Discharge: HOME | End: 2022-12-29
Attending: EMERGENCY MEDICINE

## 2022-12-29 VITALS
DIASTOLIC BLOOD PRESSURE: 50 MMHG | HEIGHT: 66 IN | SYSTOLIC BLOOD PRESSURE: 101 MMHG | RESPIRATION RATE: 16 BRPM | TEMPERATURE: 97.5 F | HEART RATE: 80 BPM | BODY MASS INDEX: 20.89 KG/M2 | WEIGHT: 130 LBS | OXYGEN SATURATION: 99 %

## 2022-12-29 DIAGNOSIS — L02.91 ABSCESS: Primary | ICD-10-CM

## 2022-12-29 DIAGNOSIS — L30.9 ECZEMA, UNSPECIFIED TYPE: ICD-10-CM

## 2022-12-29 RX ORDER — LIDOCAINE HYDROCHLORIDE AND EPINEPHRINE 10; 10 MG/ML; UG/ML
10 INJECTION, SOLUTION INFILTRATION; PERINEURAL ONCE
Status: DISCONTINUED | OUTPATIENT
Start: 2022-12-29 | End: 2022-12-29

## 2022-12-29 RX ORDER — HYDROCORTISONE 25 MG/ML
LOTION TOPICAL 2 TIMES DAILY
Qty: 50 ML | Refills: 0 | Status: SHIPPED | OUTPATIENT
Start: 2022-12-29 | End: 2023-01-12

## 2022-12-29 RX ORDER — SULFAMETHOXAZOLE AND TRIMETHOPRIM 800; 160 MG/1; MG/1
1 TABLET ORAL 2 TIMES DAILY
Qty: 14 TABLET | Refills: 0 | Status: SHIPPED | OUTPATIENT
Start: 2022-12-29 | End: 2023-01-05

## 2022-12-29 RX ORDER — LIDOCAINE HYDROCHLORIDE AND EPINEPHRINE 10; 10 MG/ML; UG/ML
20 INJECTION, SOLUTION INFILTRATION; PERINEURAL ONCE
Status: COMPLETED | OUTPATIENT
Start: 2022-12-29 | End: 2022-12-29

## 2022-12-29 RX ADMIN — LIDOCAINE HYDROCHLORIDE,EPINEPHRINE BITARTRATE 20 ML: 10; .01 INJECTION, SOLUTION INFILTRATION; PERINEURAL at 07:56

## 2022-12-29 NOTE — ED PROVIDER NOTES
History  Chief Complaint   Patient presents with   • Abscess     Pt presents to ed via walk in with a  ? Abscess to in inner thigh/ vagina area      80-year-old female with no pertinent past medical history who presents for evaluation of a left groin abscess  Patient reports that she frequently gets abscesses in this area  She does not get abscesses elsewhere  This episode started approximately 2 days ago  She feels as though it was worsened as she had to work last night and her pants were rubbing in the area of the abscess  She otherwise denies fevers, nausea, vomiting, or other systemic symptoms  She has never had the area drained before as it usually drained spontaneously  Prior to Admission Medications   Prescriptions Last Dose Informant Patient Reported? Taking? albuterol (2 5 mg/3 mL) 0 083 % nebulizer solution   No No   Sig: Take 3 mL (2 5 mg total) by nebulization every 6 (six) hours as needed for wheezing or shortness of breath   albuterol (PROVENTIL HFA,VENTOLIN HFA) 90 mcg/act inhaler   No No   Sig: Inhale 2 puffs every 4 (four) hours as needed for wheezing      Facility-Administered Medications: None       Past Medical History:   Diagnosis Date   • Asthma        History reviewed  No pertinent surgical history  History reviewed  No pertinent family history  I have reviewed and agree with the history as documented  E-Cigarette/Vaping     E-Cigarette/Vaping Substances     Social History     Tobacco Use   • Smoking status: Every Day     Packs/day: 0 25     Types: Cigarettes   • Smokeless tobacco: Never   Substance Use Topics   • Alcohol use: Yes     Comment: socially   • Drug use: Yes     Types: Marijuana       Review of Systems   Constitutional: Negative for chills and fever  HENT: Negative for congestion and sore throat  Eyes: Negative for visual disturbance  Respiratory: Negative for chest tightness and shortness of breath      Cardiovascular: Negative for chest pain and leg swelling  Gastrointestinal: Negative for abdominal pain, nausea and vomiting  Genitourinary: Negative for difficulty urinating and flank pain  Musculoskeletal: Negative for back pain and gait problem  Skin: Negative for pallor and rash  Neurological: Negative for weakness and light-headedness  All other systems reviewed and are negative  Physical Exam  Physical Exam  Vitals and nursing note reviewed  Constitutional:       General: She is not in acute distress  Appearance: She is well-developed  She is not ill-appearing  HENT:      Head: Normocephalic and atraumatic  Nose: Nose normal       Mouth/Throat:      Mouth: Mucous membranes are moist    Eyes:      Extraocular Movements: Extraocular movements intact  Cardiovascular:      Rate and Rhythm: Normal rate and regular rhythm  Heart sounds: No murmur heard  Pulmonary:      Effort: Pulmonary effort is normal       Breath sounds: Normal breath sounds  No wheezing, rhonchi or rales  Abdominal:      General: There is no distension  Palpations: Abdomen is soft  Tenderness: There is no abdominal tenderness  Musculoskeletal:         General: No swelling or tenderness  Normal range of motion  Cervical back: Normal range of motion and neck supple  Skin:     General: Skin is warm and dry  Coloration: Skin is not pale  Comments: Area of fluctuance to the left groin that is tender to palpation  Mild overlying erythema  Patches of eczema noted to bilateral upper extremities  No signs of acute infection  Neurological:      General: No focal deficit present  Mental Status: She is alert and oriented to person, place, and time     Psychiatric:         Behavior: Behavior normal          Vital Signs  ED Triage Vitals [12/29/22 0743]   Temperature Pulse Respirations Blood Pressure SpO2   97 5 °F (36 4 °C) 80 16 101/50 99 %      Temp Source Heart Rate Source Patient Position - Orthostatic VS BP Location FiO2 (%) Oral Monitor Sitting Left arm --      Pain Score       8           Vitals:    12/29/22 0743   BP: 101/50   Pulse: 80   Patient Position - Orthostatic VS: Sitting         Visual Acuity      ED Medications  Medications   lidocaine-epinephrine (XYLOCAINE/EPINEPHRINE) 1 %-1:100,000 injection 20 mL (20 mL Infiltration Given 12/29/22 0756)       Diagnostic Studies  Results Reviewed     None                 No orders to display              Procedures  Incision and drain    Date/Time: 12/29/2022 8:27 AM  Performed by: Sarah Booker MD  Authorized by: Sarah Booker MD   Universal Protocol:  Consent: Verbal consent obtained  Risks and benefits: risks, benefits and alternatives were discussed  Consent given by: patient  Patient understanding: patient states understanding of the procedure being performed  Required items: required blood products, implants, devices, and special equipment available  Patient identity confirmed: verbally with patient      Patient location:  ED  Location:     Type:  Abscess    Size:  3cm x 105cm    Location: left groin  Anesthesia (see MAR for exact dosages): Anesthesia method:  Local infiltration    Local anesthetic:  Lidocaine 1% WITH epi  Procedure details:     Complexity:  Simple    Drainage:  Purulent    Drainage amount: Moderate    Wound treatment:  Wound left open    Packing materials:  None  Post-procedure details:     Patient tolerance of procedure: Tolerated well, no immediate complications  Comments:      Upon infiltration of the area with lidocaine, abscess spontaneously drained completely  No incision required  ED Course                               SBIRT 22yo+    Flowsheet Row Most Recent Value   SBIRT (25 yo +)    In order to provide better care to our patients, we are screening all of our patients for alcohol and drug use  Would it be okay to ask you these screening questions?  No Filed at: 12/29/2022 0748                    MDM  Number of Diagnoses or Management Options  Abscess: new and requires workup  Eczema, unspecified type: new and requires workup  Diagnosis management comments: 20-year-old female who presents for evaluation of a left groin abscess  On infiltration of the area for I&D, the abscess began spontaneously draining  Moderate amount of purulent material was expressed from the abscess  Patient with improved pain  No other systemic signs or symptoms of infection  Mild overlying erythema, will treat with Bactrim  Patient also asking for something to put on her eczema which is noted to bilateral upper extremities  Patient prescribed hydrocortisone lotion  Advised to follow-up with general surgery given her abscess is recurrent in this area  Return precautions discussed  Amount and/or Complexity of Data Reviewed  Review and summarize past medical records: yes    Risk of Complications, Morbidity, and/or Mortality  Presenting problems: high  Diagnostic procedures: low  Management options: low        Disposition  Final diagnoses:   Abscess   Eczema, unspecified type     Time reflects when diagnosis was documented in both MDM as applicable and the Disposition within this note     Time User Action Codes Description Comment    12/29/2022  8:10 AM Lucas Wilkinson Add [L02 91] Abscess     12/29/2022  8:10 AM Lucas Wilkinson Add [L30 9] Eczema, unspecified type       ED Disposition     ED Disposition   Discharge    Condition   Stable    Date/Time   Thu Dec 29, 2022  8:10 AM    Comment   Anita Victoria discharge to home/self care                 Follow-up Information    None         Discharge Medication List as of 12/29/2022  8:14 AM      START taking these medications    Details   hydrocortisone 2 5 % lotion Apply topically 2 (two) times a day for 14 days, Starting Thu 12/29/2022, Until Thu 1/12/2023, Normal      sulfamethoxazole-trimethoprim (BACTRIM DS) 800-160 mg per tablet Take 1 tablet by mouth 2 (two) times a day for 7 days smx-tmp DS (BACTRIM) 800-160 mg tabs (1tab q12 D10), Starting Thu 12/29/2022, Until Thu 1/5/2023, Normal         CONTINUE these medications which have NOT CHANGED    Details   albuterol (2 5 mg/3 mL) 0 083 % nebulizer solution Take 3 mL (2 5 mg total) by nebulization every 6 (six) hours as needed for wheezing or shortness of breath, Starting Sat 4/23/2022, Normal      albuterol (PROVENTIL HFA,VENTOLIN HFA) 90 mcg/act inhaler Inhale 2 puffs every 4 (four) hours as needed for wheezing, Starting Fri 12/6/2019, Print                 PDMP Review     None          ED Provider  Electronically Signed by           Enrique Amador MD  12/29/22 8407

## 2022-12-29 NOTE — DISCHARGE INSTRUCTIONS
Follow up with general surgery  Take the prescribed medication as directed  Please return to the emergency department if you develop worsening symptoms, severe pain, fever, or anything else concerning to you

## 2023-03-31 ENCOUNTER — HOSPITAL ENCOUNTER (EMERGENCY)
Facility: HOSPITAL | Age: 23
Discharge: HOME | End: 2023-03-31
Attending: EMERGENCY MEDICINE

## 2023-03-31 ENCOUNTER — APPOINTMENT (EMERGENCY)
Dept: RADIOLOGY | Facility: HOSPITAL | Age: 23
End: 2023-03-31

## 2023-03-31 VITALS
RESPIRATION RATE: 17 BRPM | DIASTOLIC BLOOD PRESSURE: 68 MMHG | OXYGEN SATURATION: 98 % | SYSTOLIC BLOOD PRESSURE: 121 MMHG | TEMPERATURE: 98.2 F | HEART RATE: 84 BPM

## 2023-03-31 DIAGNOSIS — M25.532 LEFT WRIST PAIN: ICD-10-CM

## 2023-03-31 DIAGNOSIS — M79.642 LEFT HAND PAIN: Primary | ICD-10-CM

## 2023-03-31 NOTE — Clinical Note
Tonie Wan was seen and treated in our emergency department on 3/31/2023  No restrictions            Diagnosis: left hand pain    Evonne  may return to work on return date  She may return on this date: 04/01/2023         If you have any questions or concerns, please don't hesitate to call        Nuvia López MD    ______________________________           _______________          _______________  Hospital Representative                              Date                                Time

## 2023-06-11 ENCOUNTER — HOSPITAL ENCOUNTER (EMERGENCY)
Facility: HOSPITAL | Age: 23
Discharge: HOME | End: 2023-06-11
Attending: EMERGENCY MEDICINE
Payer: COMMERCIAL

## 2023-06-11 ENCOUNTER — APPOINTMENT (EMERGENCY)
Dept: CT IMAGING | Facility: HOSPITAL | Age: 23
End: 2023-06-11
Payer: COMMERCIAL

## 2023-06-11 VITALS
RESPIRATION RATE: 18 BRPM | DIASTOLIC BLOOD PRESSURE: 72 MMHG | TEMPERATURE: 97.7 F | OXYGEN SATURATION: 100 % | HEART RATE: 49 BPM | SYSTOLIC BLOOD PRESSURE: 123 MMHG

## 2023-06-11 DIAGNOSIS — K52.9 ACUTE COLITIS: Primary | ICD-10-CM

## 2023-06-11 LAB
ALBUMIN SERPL BCP-MCNC: 4.8 G/DL (ref 3.5–5)
ALP SERPL-CCNC: 41 U/L (ref 34–104)
ALT SERPL W P-5'-P-CCNC: 37 U/L (ref 7–52)
ANION GAP SERPL CALCULATED.3IONS-SCNC: 15 MMOL/L (ref 4–13)
AST SERPL W P-5'-P-CCNC: 36 U/L (ref 13–39)
BACTERIA UR QL AUTO: ABNORMAL /HPF
BASOPHILS # BLD AUTO: 0.04 THOUSANDS/ÂΜL (ref 0–0.1)
BASOPHILS NFR BLD AUTO: 0 % (ref 0–1)
BILIRUB SERPL-MCNC: 0.54 MG/DL (ref 0.2–1)
BILIRUB UR QL STRIP: NEGATIVE
BUN SERPL-MCNC: 11 MG/DL (ref 5–25)
CALCIUM SERPL-MCNC: 9.3 MG/DL (ref 8.4–10.2)
CHLORIDE SERPL-SCNC: 106 MMOL/L (ref 96–108)
CLARITY UR: CLEAR
CO2 SERPL-SCNC: 17 MMOL/L (ref 21–32)
COLOR UR: YELLOW
CREAT SERPL-MCNC: 0.56 MG/DL (ref 0.6–1.3)
EOSINOPHIL # BLD AUTO: 0.01 THOUSAND/ÂΜL (ref 0–0.61)
EOSINOPHIL NFR BLD AUTO: 0 % (ref 0–6)
ERYTHROCYTE [DISTWIDTH] IN BLOOD BY AUTOMATED COUNT: 13.3 % (ref 11.6–15.1)
ETHANOL SERPL-MCNC: <10 MG/DL
EXT PREGNANCY TEST URINE: NEGATIVE
EXT. CONTROL: NORMAL
GFR SERPL CREATININE-BSD FRML MDRD: 132 ML/MIN/1.73SQ M
GLUCOSE SERPL-MCNC: 119 MG/DL (ref 65–140)
GLUCOSE UR STRIP-MCNC: NEGATIVE MG/DL
HCT VFR BLD AUTO: 41 % (ref 34.8–46.1)
HGB BLD-MCNC: 13.6 G/DL (ref 11.5–15.4)
HGB UR QL STRIP.AUTO: NEGATIVE
IMM GRANULOCYTES # BLD AUTO: 0.07 THOUSAND/UL (ref 0–0.2)
IMM GRANULOCYTES NFR BLD AUTO: 1 % (ref 0–2)
KETONES UR STRIP-MCNC: ABNORMAL MG/DL
LEUKOCYTE ESTERASE UR QL STRIP: NEGATIVE
LIPASE SERPL-CCNC: 7 U/L (ref 11–82)
LYMPHOCYTES # BLD AUTO: 1.16 THOUSANDS/ÂΜL (ref 0.6–4.47)
LYMPHOCYTES NFR BLD AUTO: 8 % (ref 14–44)
MCH RBC QN AUTO: 27.8 PG (ref 26.8–34.3)
MCHC RBC AUTO-ENTMCNC: 33.2 G/DL (ref 31.4–37.4)
MCV RBC AUTO: 84 FL (ref 82–98)
MONOCYTES # BLD AUTO: 0.52 THOUSAND/ÂΜL (ref 0.17–1.22)
MONOCYTES NFR BLD AUTO: 4 % (ref 4–12)
MUCOUS THREADS UR QL AUTO: ABNORMAL
NEUTROPHILS # BLD AUTO: 13.15 THOUSANDS/ÂΜL (ref 1.85–7.62)
NEUTS SEG NFR BLD AUTO: 87 % (ref 43–75)
NITRITE UR QL STRIP: NEGATIVE
NON-SQ EPI CELLS URNS QL MICRO: ABNORMAL /HPF
NRBC BLD AUTO-RTO: 0 /100 WBCS
PH UR STRIP.AUTO: 7.5 [PH]
PLATELET # BLD AUTO: 276 THOUSANDS/UL (ref 149–390)
PMV BLD AUTO: 10.4 FL (ref 8.9–12.7)
POTASSIUM SERPL-SCNC: 3.6 MMOL/L (ref 3.5–5.3)
PROT SERPL-MCNC: 7.5 G/DL (ref 6.4–8.4)
PROT UR STRIP-MCNC: ABNORMAL MG/DL
RBC # BLD AUTO: 4.9 MILLION/UL (ref 3.81–5.12)
RBC #/AREA URNS AUTO: ABNORMAL /HPF
SODIUM SERPL-SCNC: 138 MMOL/L (ref 135–147)
SP GR UR STRIP.AUTO: 1.02 (ref 1–1.03)
UROBILINOGEN UR QL STRIP.AUTO: 1 E.U./DL
WBC # BLD AUTO: 14.95 THOUSAND/UL (ref 4.31–10.16)
WBC #/AREA URNS AUTO: ABNORMAL /HPF

## 2023-06-11 PROCEDURE — 80053 COMPREHEN METABOLIC PANEL: CPT | Performed by: EMERGENCY MEDICINE

## 2023-06-11 PROCEDURE — 36415 COLL VENOUS BLD VENIPUNCTURE: CPT | Performed by: EMERGENCY MEDICINE

## 2023-06-11 PROCEDURE — 96374 THER/PROPH/DIAG INJ IV PUSH: CPT

## 2023-06-11 PROCEDURE — 83690 ASSAY OF LIPASE: CPT | Performed by: EMERGENCY MEDICINE

## 2023-06-11 PROCEDURE — 81025 URINE PREGNANCY TEST: CPT | Performed by: EMERGENCY MEDICINE

## 2023-06-11 PROCEDURE — 96361 HYDRATE IV INFUSION ADD-ON: CPT

## 2023-06-11 PROCEDURE — 85025 COMPLETE CBC W/AUTO DIFF WBC: CPT | Performed by: EMERGENCY MEDICINE

## 2023-06-11 PROCEDURE — 81003 URINALYSIS AUTO W/O SCOPE: CPT | Performed by: EMERGENCY MEDICINE

## 2023-06-11 PROCEDURE — G1004 CDSM NDSC: HCPCS

## 2023-06-11 PROCEDURE — 96375 TX/PRO/DX INJ NEW DRUG ADDON: CPT

## 2023-06-11 PROCEDURE — 81001 URINALYSIS AUTO W/SCOPE: CPT | Performed by: EMERGENCY MEDICINE

## 2023-06-11 PROCEDURE — 99283 EMERGENCY DEPT VISIT LOW MDM: CPT

## 2023-06-11 PROCEDURE — 82077 ASSAY SPEC XCP UR&BREATH IA: CPT | Performed by: EMERGENCY MEDICINE

## 2023-06-11 PROCEDURE — 74177 CT ABD & PELVIS W/CONTRAST: CPT

## 2023-06-11 RX ORDER — AMOXICILLIN AND CLAVULANATE POTASSIUM 875; 125 MG/1; MG/1
1 TABLET, FILM COATED ORAL ONCE
Status: COMPLETED | OUTPATIENT
Start: 2023-06-11 | End: 2023-06-11

## 2023-06-11 RX ORDER — AMOXICILLIN AND CLAVULANATE POTASSIUM 875; 125 MG/1; MG/1
1 TABLET, FILM COATED ORAL EVERY 12 HOURS SCHEDULED
Qty: 14 TABLET | Refills: 0 | Status: SHIPPED | OUTPATIENT
Start: 2023-06-11 | End: 2023-06-18

## 2023-06-11 RX ORDER — KETOROLAC TROMETHAMINE 30 MG/ML
15 INJECTION, SOLUTION INTRAMUSCULAR; INTRAVENOUS ONCE
Status: COMPLETED | OUTPATIENT
Start: 2023-06-11 | End: 2023-06-11

## 2023-06-11 RX ORDER — ONDANSETRON 2 MG/ML
4 INJECTION INTRAMUSCULAR; INTRAVENOUS ONCE
Status: COMPLETED | OUTPATIENT
Start: 2023-06-11 | End: 2023-06-11

## 2023-06-11 RX ADMIN — SODIUM CHLORIDE 1000 ML: 0.9 INJECTION, SOLUTION INTRAVENOUS at 16:07

## 2023-06-11 RX ADMIN — KETOROLAC TROMETHAMINE 15 MG: 30 INJECTION, SOLUTION INTRAMUSCULAR at 17:19

## 2023-06-11 RX ADMIN — AMOXICILLIN AND CLAVULANATE POTASSIUM 1 TABLET: 875; 125 TABLET, FILM COATED ORAL at 18:26

## 2023-06-11 RX ADMIN — IOHEXOL 70 ML: 350 INJECTION, SOLUTION INTRAVENOUS at 17:32

## 2023-06-11 RX ADMIN — ONDANSETRON 4 MG: 2 INJECTION INTRAMUSCULAR; INTRAVENOUS at 15:37

## 2023-06-11 NOTE — ED PROVIDER NOTES
History  Chief Complaint   Patient presents with   • Vomiting     Pt presents to the ED with c/o diarrhea and vomiting after eating this afternoon     71-year-old female presents with 1 day of severe nausea, vomiting, diarrhea, diaphoresis, lightheadedness, fatigue, reports she was drinking with family last night, does not drink frequently, she denies any fever, headache, chest pain, cough, shortness of breath, she reports bilateral lower quadrant abdominal pain, no hematemesis or hematochezia, dysuria, hematuria, or other complaints  The patient does not smoke cigarettes, uses marijuana daily  Prior to Admission Medications   Prescriptions Last Dose Informant Patient Reported? Taking? albuterol (2 5 mg/3 mL) 0 083 % nebulizer solution   No No   Sig: Take 3 mL (2 5 mg total) by nebulization every 6 (six) hours as needed for wheezing or shortness of breath   albuterol (PROVENTIL HFA,VENTOLIN HFA) 90 mcg/act inhaler   No No   Sig: Inhale 2 puffs every 4 (four) hours as needed for wheezing   hydrocortisone 2 5 % lotion   No No   Sig: Apply topically 2 (two) times a day for 14 days      Facility-Administered Medications: None       Past Medical History:   Diagnosis Date   • Asthma        History reviewed  No pertinent surgical history  History reviewed  No pertinent family history  I have reviewed and agree with the history as documented  E-Cigarette/Vaping   • E-Cigarette Use Never User      E-Cigarette/Vaping Substances     Social History     Tobacco Use   • Smoking status: Former     Packs/day: 0 25     Types: Cigarettes     Start date: 12/29/2022   • Smokeless tobacco: Never   Vaping Use   • Vaping Use: Never used   Substance Use Topics   • Alcohol use: Yes     Comment: socially   • Drug use: Yes     Frequency: 1 0 times per week     Types: Marijuana       Review of Systems   Constitutional: Negative for fever  HENT: Negative for congestion  Eyes: Negative for visual disturbance  Respiratory: Negative for cough and shortness of breath  Cardiovascular: Negative for chest pain  Gastrointestinal: Positive for abdominal pain, diarrhea, nausea and vomiting  Negative for constipation  Endocrine: Negative for polyuria  Genitourinary: Negative for dysuria and hematuria  Musculoskeletal: Negative for myalgias  Neurological: Positive for light-headedness and headaches  Physical Exam  Physical Exam  Vitals and nursing note reviewed  Constitutional:       General: She is not in acute distress  Appearance: Normal appearance  She is well-developed  HENT:      Head: Normocephalic and atraumatic  Eyes:      Extraocular Movements: Extraocular movements intact  Conjunctiva/sclera: Conjunctivae normal    Cardiovascular:      Rate and Rhythm: Normal rate and regular rhythm  Pulmonary:      Effort: Pulmonary effort is normal  No respiratory distress  Breath sounds: Normal breath sounds  Abdominal:      General: There is no distension  Palpations: Abdomen is soft  Tenderness: There is abdominal tenderness in the right lower quadrant, suprapubic area and left lower quadrant  There is no right CVA tenderness or left CVA tenderness  Musculoskeletal:         General: No swelling  Cervical back: Neck supple  Skin:     General: Skin is warm and dry  Capillary Refill: Capillary refill takes less than 2 seconds  Neurological:      General: No focal deficit present  Mental Status: She is alert and oriented to person, place, and time     Psychiatric:         Mood and Affect: Mood normal          Vital Signs  ED Triage Vitals   Temperature Pulse Respirations Blood Pressure SpO2   06/11/23 1714 06/11/23 1427 06/11/23 1426 06/11/23 1426 06/11/23 1427   97 7 °F (36 5 °C) (!) 49 18 92/55 100 %      Temp src Heart Rate Source Patient Position - Orthostatic VS BP Location FiO2 (%)   -- 06/11/23 1427 06/11/23 1426 06/11/23 1426 --    Monitor Lying Left arm Pain Score       06/11/23 1719       6           Vitals:    06/11/23 1426 06/11/23 1427   BP: 92/55 123/72   Pulse:  (!) 49   Patient Position - Orthostatic VS: Lying          Visual Acuity      ED Medications  Medications   amoxicillin-clavulanate (AUGMENTIN) 875-125 mg per tablet 1 tablet (has no administration in time range)   ketorolac (TORADOL) injection 15 mg (15 mg Intravenous Given 6/11/23 1719)   ondansetron (ZOFRAN) injection 4 mg (4 mg Intravenous Given 6/11/23 1537)   sodium chloride 0 9 % bolus 1,000 mL (1,000 mL Intravenous New Bag 6/11/23 1607)   iohexol (OMNIPAQUE) 350 MG/ML injection (SINGLE-DOSE) 100 mL (70 mL Intravenous Given 6/11/23 1732)       Diagnostic Studies  Results Reviewed     Procedure Component Value Units Date/Time    Urine Microscopic [[id number]]  (Abnormal) Collected: 06/11/23 1642    Lab Status: Final result Specimen: Urine, Clean Catch Updated: 06/11/23 1703     RBC, UA 2-4 /hpf      WBC, UA 2-4 /hpf      Epithelial Cells Occasional /hpf      Bacteria, UA Occasional /hpf      MUCUS THREADS Moderate    UA (URINE) with reflex to Scope [[id number]]  (Abnormal) Collected: 06/11/23 1642    Lab Status: Final result Specimen: Urine, Clean Catch Updated: 06/11/23 1647     Color, UA Yellow     Clarity, UA Clear     Specific [place], UA 1 020     pH, UA 7 5     Leukocytes, UA Negative     Nitrite, UA Negative     Protein, UA 1+ mg/dl      Glucose, UA Negative mg/dl      Ketones, UA 15 (1+) mg/dl      Urobilinogen, UA 1 0 E U /dl      Bilirubin, UA Negative     Occult Blood, UA Negative    POCT pregnancy, urine [[id number]]  (Normal) Resulted: 06/11/23 1642    Lab Status: Final result Updated: 06/11/23 1642     EXT Preg Test, Ur Negative     Control Valid    Ethanol [[id number]]  (Normal) Collected: 06/11/23 1529    Lab Status: Final result Specimen: Blood from Arm, Left Updated: 06/11/23 1559     Ethanol Lvl <10 mg/dL     Comprehensive metabolic panel [[id number]]  (Abnormal) Collected: 06/11/23 1529    Lab Status: Final result Specimen: Blood from Arm, Left Updated: 06/11/23 1554     Sodium 138 mmol/L      Potassium 3 6 mmol/L      Chloride 106 mmol/L      CO2 17 mmol/L      ANION GAP 15 mmol/L      BUN 11 mg/dL      Creatinine 0 56 mg/dL      Glucose 119 mg/dL      Calcium 9 3 mg/dL      AST 36 U/L      ALT 37 U/L      Alkaline Phosphatase 41 U/L      Total Protein 7 5 g/dL      Albumin 4 8 g/dL      Total Bilirubin 0 54 mg/dL      eGFR 132 ml/min/1 73sq m     Narrative:      National Kidney Disease Foundation guidelines for Chronic Kidney Disease (CKD):   •  Stage 1 with normal or high GFR (GFR > 90 mL/min/1 73 square meters)  •  Stage 2 Mild CKD (GFR = 60-89 mL/min/1 73 square meters)  •  Stage 3A Moderate CKD (GFR = 45-59 mL/min/1 73 square meters)  •  Stage 3B Moderate CKD (GFR = 30-44 mL/min/1 73 square meters)  •  Stage 4 Severe CKD (GFR = 15-29 mL/min/1 73 square meters)  •  Stage 5 End Stage CKD (GFR <15 mL/min/1 73 square meters)  Note: GFR calculation is accurate only with a steady state creatinine    Lipase [[id number]]  (Abnormal) Collected: 06/11/23 1529    Lab Status: Final result Specimen: Blood from Arm, Left Updated: 06/11/23 1554     Lipase 7 u/L     CBC and differential [[id number]]  (Abnormal) Collected: 06/11/23 1529    Lab Status: Final result Specimen: Blood from Arm, Left Updated: 06/11/23 1534     WBC 14 95 Thousand/uL      RBC 4 90 Million/uL      Hemoglobin 13 6 g/dL      Hematocrit 41 0 %      MCV 84 fL      MCH 27 8 pg      MCHC 33 2 g/dL      RDW 13 3 %      MPV 10 4 fL      Platelets 520 Thousands/uL      nRBC 0 /100 WBCs      Neutrophils Relative 87 %      Immat GRANS % 1 %      Lymphocytes Relative 8 %      Monocytes Relative 4 %      Eosinophils Relative 0 %      Basophils Relative 0 %      Neutrophils Absolute 13 15 Thousands/µL      Immature Grans Absolute 0 07 Thousand/uL      Lymphocytes Absolute 1 16 Thousands/µL      Monocytes Absolute 0 52 Thousand/µL Eosinophils Absolute 0 01 Thousand/µL      Basophils Absolute 0 04 Thousands/µL                  CT abdomen pelvis with contrast   Final Result by Kellie Sanabria MD (06/11 1752)      Wall thickening involving the transverse and descending colon consistent with colitis  Gallbladder wall thickening is noted  Nonspecific  No pericholecystic fluid  If there is any concern for gallbladder disease, ultrasound would be recommended  Very large left gonadal vein varix  Correlate for potential pelvic congestion syndrome including most common symptom of dyspareunia  Workstation performed: [id number]                    Procedures  Procedures         ED Course         SBIRT 20yo+    Flowsheet Row Most Recent Value   Initial Alcohol Screen: US AUDIT-C     1  How often do you have a drink containing alcohol? 0 Filed at: 06/11/2023 1425   2  How many drinks containing alcohol do you have on a typical day you are drinking? 0 Filed at: 06/11/2023 1425   3a  Male UNDER 65: How often do you have five or more drinks on one occasion? 0 Filed at: 06/11/2023 1425   3b  FEMALE Any Age, or MALE 65+: How often do you have 4 or more drinks on one occassion? 0 Filed at: 06/11/2023 1425   Audit-C Score 0 Filed at: 06/11/2023 1425   CAROLANN: How many times in the past year have you    Used an illegal drug or used a prescription medication for non-medical reasons? Never Filed at: 06/11/2023 1425                    Medical Decision Making  Patient will be evaluated for possible hangover effect of alcohol, dehydration, electrolyte abnormalities, or intra-abdominal pathology including appendicitis, diverticulitis, colitis, will be evaluated for pregnancy  Patient's work-up shows colitis, she will be treated with Augmentin, and will be encouraged to follow-up with GI and primary care as an outpatient  Amount and/or Complexity of Data Reviewed  Labs: ordered  Radiology: ordered        Risk  Prescription drug management  Disposition  Final diagnoses:   Acute colitis     Time reflects when diagnosis was documented in both MDM as applicable and the Disposition within this note     Time User Action Codes Description Comment    6/11/2023  6:13 PM Thai Bailey Add [K52 9] Acute colitis       ED Disposition     ED Disposition   Discharge    Condition   Stable    Date/Time   Sun Jun 11, 2023  6:13 PM    [facility] discharge to home/self care  Follow-up Information     Follow up With Specialties Details Why Contact Info Additional [address]  Emergency Department Emergency Medicine Go to  As needed [address] [facility],[address] [address]  [address] [facility] Emergency Department, [address], [address] [facility]    [facility] Family Medicine Schedule an appointment as soon as possible for a visit in 3 days For follow-up [address]  [address] [facility] [address] [phone]  [facility] [place] [address], [place], [place], [address] [phone]          Patient's Medications   Discharge Prescriptions    AMOXICILLIN-CLAVULANATE (AUGMENTIN) 875-125 MG PER TABLET    Take 1 tablet by mouth every 12 (twelve) hours for 7 days       Start Date: 6/11/2023 End Date: 6/18/2023       Order Dose: 1 tablet       Quantity: 14 tablet    Refills: 0       No discharge procedures on file      PDMP Review     None          ED Provider  Electronically Signed by           Killian Brewster MD  06/11/23 0842

## 2023-06-11 NOTE — Clinical Note
Rosalino Rosa was seen and treated in our emergency department on 6/11/2023  No restrictions            Diagnosis:     Evonne  may return to work on return date  She may return on this date: 06/12/2023         If you have any questions or concerns, please don't hesitate to call        Mynor Miranda MD    ______________________________           _______________          _______________  Hospital Representative                              Date                                Time

## 2023-10-27 ENCOUNTER — HOSPITAL ENCOUNTER (EMERGENCY)
Facility: HOSPITAL | Age: 23
Discharge: HOME | End: 2023-10-27
Attending: EMERGENCY MEDICINE | Admitting: EMERGENCY MEDICINE
Payer: COMMERCIAL

## 2023-10-27 ENCOUNTER — APPOINTMENT (EMERGENCY)
Dept: RADIOLOGY | Facility: HOSPITAL | Age: 23
End: 2023-10-27
Payer: COMMERCIAL

## 2023-10-27 VITALS
TEMPERATURE: 98.9 F | DIASTOLIC BLOOD PRESSURE: 76 MMHG | HEART RATE: 61 BPM | WEIGHT: 125 LBS | SYSTOLIC BLOOD PRESSURE: 118 MMHG | RESPIRATION RATE: 18 BRPM | HEIGHT: 65 IN | BODY MASS INDEX: 20.83 KG/M2 | OXYGEN SATURATION: 100 %

## 2023-10-27 DIAGNOSIS — S69.92XA INJURY OF FINGER OF LEFT HAND, INITIAL ENCOUNTER: Primary | ICD-10-CM

## 2023-10-27 PROCEDURE — 99283 EMERGENCY DEPT VISIT LOW MDM: CPT

## 2023-10-27 PROCEDURE — 73130 X-RAY EXAM OF HAND: CPT

## 2023-10-27 PROCEDURE — 99284 EMERGENCY DEPT VISIT MOD MDM: CPT | Performed by: PHYSICIAN ASSISTANT

## 2023-10-27 NOTE — DISCHARGE INSTRUCTIONS
Please return to the emergency department for worsening symptoms including chest pain, shortness of breath, dizziness, lightheadedness, fever greater than 103, severe pain, inability to walk, fainting episodes, etc.. Please follow-up with your family practice provider as soon as possible.   If symptoms persist, please follow-up with a hand specialist.

## 2023-10-27 NOTE — ED PROVIDER NOTES
History  Chief Complaint   Patient presents with    Finger Pain     PT "I fell backwards carrying a bunch of groceries about like 40 mns ago. I can deal with pain but this middle finger is seriously hurting me" PT denies CP, SOB, -loc     This is a 20-year-old female with no significant past medical history presenting to the emergency department today with left middle finger pain. Patient notes she was carrying groceries and tripped up a stair and fell backwards for extending her left middle finger. She notes fall on the outstretched hand but she has no pain to the hand or wrist itself. She also has no pain to the forearm, elbow, or shoulder. The patient notes she is still able to move the finger though with some difficulty. She denies any numbness or tingling. She denies any prodromal symptoms prior to the fall including chest pain, shortness of breath, or dizziness. Patient denies other complaints at this time. History provided by:  Patient   used: No    Hand Pain  Location:  Left Middle Finger  Severity:  Mild  Onset quality:  Sudden  Duration: just PTA. Timing:  Constant  Progression:  Unchanged  Chronicity:  New  Worsened by: Movement  Associated symptoms: no abdominal pain, no chest pain, no congestion, no cough, no diarrhea, no ear pain, no fatigue, no fever, no headaches, no loss of consciousness, no myalgias, no nausea, no rash, no rhinorrhea, no shortness of breath, no sore throat, no vomiting and no wheezing        Prior to Admission Medications   Prescriptions Last Dose Informant Patient Reported? Taking?    albuterol (2.5 mg/3 mL) 0.083 % nebulizer solution More than a month  No No   Sig: Take 3 mL (2.5 mg total) by nebulization every 6 (six) hours as needed for wheezing or shortness of breath   Patient not taking: Reported on 10/27/2023   albuterol (PROVENTIL HFA,VENTOLIN HFA) 90 mcg/act inhaler More than a month  No No   Sig: Inhale 2 puffs every 4 (four) hours as needed for wheezing   Patient not taking: Reported on 10/27/2023   hydrocortisone 2.5 % lotion   No No   Sig: Apply topically 2 (two) times a day for 14 days      Facility-Administered Medications: None       Past Medical History:   Diagnosis Date    Asthma        No past surgical history on file. No family history on file. I have reviewed and agree with the history as documented. E-Cigarette/Vaping    E-Cigarette Use Former User      E-Cigarette/Vaping Substances     Social History     Tobacco Use    Smoking status: Former     Packs/day: 0.25     Types: Cigarettes     Start date: 12/29/2022    Smokeless tobacco: Never   Vaping Use    Vaping Use: Former   Substance Use Topics    Alcohol use: Yes     Comment: socially    Drug use: Yes     Frequency: 1.0 times per week     Types: Marijuana       Review of Systems   Constitutional:  Negative for appetite change, chills, diaphoresis, fatigue and fever. HENT:  Negative for congestion, ear pain, rhinorrhea and sore throat. Eyes:  Negative for visual disturbance. Respiratory:  Negative for cough, chest tightness, shortness of breath and wheezing. Cardiovascular:  Negative for chest pain, palpitations and leg swelling. Gastrointestinal:  Negative for abdominal pain, constipation, diarrhea, nausea and vomiting. Musculoskeletal:  Negative for myalgias, neck pain and neck stiffness. Skin:  Negative for rash and wound. Neurological:  Negative for dizziness, seizures, loss of consciousness, syncope, weakness, light-headedness, numbness and headaches. Psychiatric/Behavioral:  Negative for confusion. All other systems reviewed and are negative. Physical Exam  Physical Exam  Vitals and nursing note reviewed. Constitutional:       General: She is not in acute distress. Appearance: Normal appearance. She is normal weight. She is not ill-appearing, toxic-appearing or diaphoretic. HENT:      Head: Normocephalic and atraumatic.       Nose: Nose normal. No congestion or rhinorrhea. Mouth/Throat:      Mouth: Mucous membranes are moist.      Pharynx: No oropharyngeal exudate or posterior oropharyngeal erythema. Eyes:      General: No scleral icterus. Right eye: No discharge. Left eye: No discharge. Extraocular Movements: Extraocular movements intact. Pupils: Pupils are equal, round, and reactive to light. Cardiovascular:      Rate and Rhythm: Normal rate and regular rhythm. Pulses: Normal pulses. Heart sounds: Normal heart sounds. No murmur heard. No friction rub. No gallop. Pulmonary:      Effort: Pulmonary effort is normal. No respiratory distress. Breath sounds: Normal breath sounds. No stridor. No wheezing, rhonchi or rales. Chest:      Chest wall: No tenderness. Musculoskeletal:         General: Normal range of motion. Cervical back: Normal range of motion. No tenderness. Right lower leg: No edema. Left lower leg: No edema. Comments: The patient has normal range of motion to flexion and extension of the left middle finger; patient has tenderness to palpation to the first knuckle of the left middle finger; otherwise, the patient has no tenderness to palpation throughout the left hand; he has no tenderness to palpation to the left wrist or to the left anatomic snuffbox; patient has normal range of motion of flexion, extension, radial deviation, and ulnar deviation of the left wrist; no tenderness to palpation of the left forearm or to the left elbow; patient has no tenderness to palpation to the left shoulder or clavicle   Skin:     General: Skin is warm and dry. Capillary Refill: Capillary refill takes less than 2 seconds. Coloration: Skin is not jaundiced or pale. Neurological:      General: No focal deficit present. Mental Status: She is alert and oriented to person, place, and time. Mental status is at baseline.       Comments: 5 out of 5  strength bilaterally   Normal sensation to the distal aspect of the left middle finger   Psychiatric:         Mood and Affect: Mood normal.         Behavior: Behavior normal.         Vital Signs  ED Triage Vitals [10/27/23 1105]   Temperature Pulse Respirations Blood Pressure SpO2   98.9 °F (37.2 °C) 61 18 118/76 100 %      Temp Source Heart Rate Source Patient Position - Orthostatic VS BP Location FiO2 (%)   Oral Monitor Sitting Left arm --      Pain Score       6           Vitals:    10/27/23 1105   BP: 118/76   Pulse: 61   Patient Position - Orthostatic VS: Sitting         Visual Acuity      ED Medications  Medications - No data to display    Diagnostic Studies  Results Reviewed       None                   XR hand 3+ views LEFT   Final Result by Ananda Cintron MD (10/27 1638)      No acute osseous abnormality. Resident: Kamila Courtney, the attending radiologist, have reviewed the images and agree with the final report above. Workstation performed: [id number]                    Procedures  Procedures         ED Course                                             Medical Decision Making  25-year-old female presenting to the emergency department after injuring her left middle finger. Did so well carrying groceries. Vital signs are stable. Afebrile. On physical examination, the patient has tenderness to palpation to the proximal knuckle of the left middle finger. She has normal range of motion to the fingers, wrist, and elbow of the left upper extremity. She has no left-sided anatomic snuffbox tenderness to palpation. XR of the left hand is without acute osseous abnormalities on my independent interpretation. The patient was placed in an aluminum splint while here in the emergency department for comfort. The patient is stable for discharge at this time. RICE. Tylenol and Motrin for pain relief. Strict return precautions were given. Recommend PCP follow-up as soon as possible.  The patient and/or patient's proxy verify their understanding and agree to the plan at this time. All questions answered to the patient and/or their proxy's satisfaction. All labs reviewed and utilized in the medical decision making process (if labs were ordered). Portions of the record may have been created with voice recognition software. Occasional wrong word or "sound a like" substitutions may have occurred due to the inherent limitations of voice recognition software. Read the chart carefully and recognize, using context, where substitutions have occurred. Problems Addressed:  Injury of finger of left hand, initial encounter: undiagnosed new problem with uncertain prognosis    Amount and/or Complexity of Data Reviewed  Radiology: ordered and independent interpretation performed. Decision-making details documented in ED Course. Details: XR Left Hand             Disposition  Final diagnoses:   Injury of finger of left hand, initial encounter     Time reflects when diagnosis was documented in both MDM as applicable and the Disposition within this note       Time User Action Codes Description Comment    10/27/2023 11:49 AM Sandra Aleman [C05.22TW] Injury of finger of left hand, initial encounter           ED Disposition       ED Disposition   Discharge    Condition   Stable    Date/Time   Fri Oct 27, 2023 11:49 AM    Comment   Emely Quinones discharge to home/self care.                    Follow-up Information       Follow up With Specialties Details Why Contact Info Additional [address] [facility],[address] Emergency Department Emergency Medicine Go to  If symptoms worsen [address] [phone] [address] [facility] Emergency Department, [address] [place] [address], [address] [facility] Family Medicine Schedule an appointment as soon as possible for a visit   [address] [facility] [address]  [address] [facility], [address], [place], [address]   [phone]    Fred Velásquez MD Orthopedic Surgery, Hand Surgery Schedule an appointment as soon as possible for a visit   [address] [place] [address],[address] [address] [phone]               Discharge Medication List as of 10/27/2023 11:50 AM        CONTINUE these medications which have NOT CHANGED    Details   albuterol (2.5 mg/3 mL) 0.083 % nebulizer solution Take 3 mL (2.5 mg total) by nebulization every 6 (six) hours as needed for wheezing or shortness of breath, Starting Sat 4/23/2022, Normal      albuterol (PROVENTIL HFA,VENTOLIN HFA) 90 mcg/act inhaler Inhale 2 puffs every 4 (four) hours as needed for wheezing, Starting Fri 12/6/2019, Print      hydrocortisone 2.5 % lotion Apply topically 2 (two) times a day for 14 days, Starting Thu 12/29/2022, Until Thu 1/12/2023, Normal                 PDMP Review       None            ED Provider  Electronically Signed by             Erika Lopez PA-C  10/27/23 0134

## 2023-10-27 NOTE — Clinical Note
Chaparrita Alvarez was seen and treated in our emergency department on 10/27/2023. Diagnosis:     Evonne  is off the rest of the shift today. She may return on this date:     Light duty until evaluated by an orthopedist or primary care provider. If you have any questions or concerns, please don't hesitate to call.       Holly Saha PA-C    ______________________________           _______________          _______________  Hospital Representative                              Date                                Time

## 2024-04-10 ENCOUNTER — HOSPITAL ENCOUNTER (EMERGENCY)
Facility: HOSPITAL | Age: 24
Discharge: HOME | End: 2024-04-10
Attending: EMERGENCY MEDICINE

## 2024-04-10 VITALS
TEMPERATURE: 98.3 F | OXYGEN SATURATION: 100 % | SYSTOLIC BLOOD PRESSURE: 131 MMHG | HEART RATE: 58 BPM | RESPIRATION RATE: 18 BRPM | DIASTOLIC BLOOD PRESSURE: 86 MMHG

## 2024-04-10 DIAGNOSIS — R11.2 NAUSEA AND VOMITING: Primary | ICD-10-CM

## 2024-04-10 LAB
ALBUMIN SERPL BCP-MCNC: 4.6 G/DL (ref 3.5–5)
ALP SERPL-CCNC: 48 U/L (ref 34–104)
ALT SERPL W P-5'-P-CCNC: 57 U/L (ref 7–52)
ANION GAP SERPL CALCULATED.3IONS-SCNC: 10 MMOL/L (ref 4–13)
AST SERPL W P-5'-P-CCNC: 41 U/L (ref 13–39)
BACTERIA UR QL AUTO: ABNORMAL /HPF
BASOPHILS # BLD AUTO: 0.03 THOUSANDS/ÂΜL (ref 0–0.1)
BASOPHILS NFR BLD AUTO: 0 % (ref 0–1)
BILIRUB SERPL-MCNC: 0.47 MG/DL (ref 0.2–1)
BILIRUB UR QL STRIP: NEGATIVE
BUN SERPL-MCNC: 10 MG/DL (ref 5–25)
CALCIUM SERPL-MCNC: 9.3 MG/DL (ref 8.4–10.2)
CHLORIDE SERPL-SCNC: 103 MMOL/L (ref 96–108)
CK SERPL-CCNC: 89 U/L (ref 26–192)
CLARITY UR: CLEAR
CO2 SERPL-SCNC: 26 MMOL/L (ref 21–32)
COLOR UR: YELLOW
CREAT SERPL-MCNC: 0.53 MG/DL (ref 0.6–1.3)
EOSINOPHIL # BLD AUTO: 0.01 THOUSAND/ÂΜL (ref 0–0.61)
EOSINOPHIL NFR BLD AUTO: 0 % (ref 0–6)
ERYTHROCYTE [DISTWIDTH] IN BLOOD BY AUTOMATED COUNT: 13.4 % (ref 11.6–15.1)
EXT PREGNANCY TEST URINE: NEGATIVE
EXT. CONTROL: NORMAL
GFR SERPL CREATININE-BSD FRML MDRD: 134 ML/MIN/1.73SQ M
GLUCOSE SERPL-MCNC: 88 MG/DL (ref 65–140)
GLUCOSE UR STRIP-MCNC: NEGATIVE MG/DL
HCT VFR BLD AUTO: 41.4 % (ref 34.8–46.1)
HGB BLD-MCNC: 13.3 G/DL (ref 11.5–15.4)
HGB UR QL STRIP.AUTO: ABNORMAL
IMM GRANULOCYTES # BLD AUTO: 0.03 THOUSAND/UL (ref 0–0.2)
IMM GRANULOCYTES NFR BLD AUTO: 0 % (ref 0–2)
KETONES UR STRIP-MCNC: NEGATIVE MG/DL
LEUKOCYTE ESTERASE UR QL STRIP: NEGATIVE
LIPASE SERPL-CCNC: 18 U/L (ref 11–82)
LYMPHOCYTES # BLD AUTO: 2.94 THOUSANDS/ÂΜL (ref 0.6–4.47)
LYMPHOCYTES NFR BLD AUTO: 26 % (ref 14–44)
MAGNESIUM SERPL-MCNC: 2 MG/DL (ref 1.9–2.7)
MCH RBC QN AUTO: 27.7 PG (ref 26.8–34.3)
MCHC RBC AUTO-ENTMCNC: 32.1 G/DL (ref 31.4–37.4)
MCV RBC AUTO: 86 FL (ref 82–98)
MONOCYTES # BLD AUTO: 0.96 THOUSAND/ÂΜL (ref 0.17–1.22)
MONOCYTES NFR BLD AUTO: 9 % (ref 4–12)
MUCOUS THREADS UR QL AUTO: ABNORMAL
NEUTROPHILS # BLD AUTO: 7.24 THOUSANDS/ÂΜL (ref 1.85–7.62)
NEUTS SEG NFR BLD AUTO: 65 % (ref 43–75)
NITRITE UR QL STRIP: NEGATIVE
NON-SQ EPI CELLS URNS QL MICRO: ABNORMAL /HPF
NRBC BLD AUTO-RTO: 0 /100 WBCS
PH UR STRIP.AUTO: 7.5 [PH]
PLATELET # BLD AUTO: 315 THOUSANDS/UL (ref 149–390)
PMV BLD AUTO: 10.1 FL (ref 8.9–12.7)
POTASSIUM SERPL-SCNC: 3.3 MMOL/L (ref 3.5–5.3)
PROT SERPL-MCNC: 7.6 G/DL (ref 6.4–8.4)
PROT UR STRIP-MCNC: ABNORMAL MG/DL
RBC # BLD AUTO: 4.8 MILLION/UL (ref 3.81–5.12)
RBC #/AREA URNS AUTO: ABNORMAL /HPF
SODIUM SERPL-SCNC: 139 MMOL/L (ref 135–147)
SP GR UR STRIP.AUTO: 1.02 (ref 1–1.03)
UROBILINOGEN UR QL STRIP.AUTO: 0.2 E.U./DL
WBC # BLD AUTO: 11.21 THOUSAND/UL (ref 4.31–10.16)
WBC #/AREA URNS AUTO: ABNORMAL /HPF

## 2024-04-10 PROCEDURE — 83690 ASSAY OF LIPASE: CPT | Performed by: EMERGENCY MEDICINE

## 2024-04-10 PROCEDURE — 80053 COMPREHEN METABOLIC PANEL: CPT | Performed by: EMERGENCY MEDICINE

## 2024-04-10 PROCEDURE — 36415 COLL VENOUS BLD VENIPUNCTURE: CPT | Performed by: EMERGENCY MEDICINE

## 2024-04-10 PROCEDURE — 96361 HYDRATE IV INFUSION ADD-ON: CPT

## 2024-04-10 PROCEDURE — 96374 THER/PROPH/DIAG INJ IV PUSH: CPT

## 2024-04-10 PROCEDURE — 83735 ASSAY OF MAGNESIUM: CPT | Performed by: EMERGENCY MEDICINE

## 2024-04-10 PROCEDURE — 82550 ASSAY OF CK (CPK): CPT | Performed by: EMERGENCY MEDICINE

## 2024-04-10 PROCEDURE — 81001 URINALYSIS AUTO W/SCOPE: CPT | Performed by: EMERGENCY MEDICINE

## 2024-04-10 PROCEDURE — 85025 COMPLETE CBC W/AUTO DIFF WBC: CPT | Performed by: EMERGENCY MEDICINE

## 2024-04-10 PROCEDURE — 99284 EMERGENCY DEPT VISIT MOD MDM: CPT | Performed by: EMERGENCY MEDICINE

## 2024-04-10 PROCEDURE — 99283 EMERGENCY DEPT VISIT LOW MDM: CPT

## 2024-04-10 PROCEDURE — 81025 URINE PREGNANCY TEST: CPT | Performed by: EMERGENCY MEDICINE

## 2024-04-10 RX ORDER — ONDANSETRON 2 MG/ML
4 INJECTION INTRAMUSCULAR; INTRAVENOUS ONCE
Status: COMPLETED | OUTPATIENT
Start: 2024-04-10 | End: 2024-04-10

## 2024-04-10 RX ORDER — ONDANSETRON 4 MG/1
4 TABLET, ORALLY DISINTEGRATING ORAL EVERY 6 HOURS PRN
Qty: 15 TABLET | Refills: 0 | Status: SHIPPED | OUTPATIENT
Start: 2024-04-10

## 2024-04-10 RX ORDER — POTASSIUM CHLORIDE 20 MEQ/1
20 TABLET, EXTENDED RELEASE ORAL ONCE
Status: COMPLETED | OUTPATIENT
Start: 2024-04-10 | End: 2024-04-10

## 2024-04-10 RX ADMIN — ONDANSETRON 4 MG: 2 INJECTION INTRAMUSCULAR; INTRAVENOUS at 15:14

## 2024-04-10 RX ADMIN — SODIUM CHLORIDE 1000 ML: 0.9 INJECTION, SOLUTION INTRAVENOUS at 15:11

## 2024-04-10 RX ADMIN — POTASSIUM CHLORIDE 20 MEQ: 1500 TABLET, EXTENDED RELEASE ORAL at 16:45

## 2024-04-10 NOTE — Clinical Note
Evonne Seth was seen and treated in our emergency department on 4/10/2024.                Diagnosis: gastroenteritis    Evonne  .    She may return on this date: 04/11/2024         If you have any questions or concerns, please don't hesitate to call.      Katie Munson MD    ______________________________           _______________          _______________  Hospital Representative                              Date                                Time

## 2024-04-10 NOTE — Clinical Note
accompanied Evonne Seth to the emergency department on 4/10/2024.    Return date if applicable: 04/11/2024    Evonne Seth was accompanied to the ER by her son, who missed school today because she was ill.    If you have any questions or concerns, please don't hesitate to call.      Katie Munson MD

## 2024-04-16 NOTE — ED PROVIDER NOTES
History  Chief Complaint   Patient presents with    Vomiting     For 3 days, with cramping and shaking     Healthy 23-year-old female presents with a few days of vomiting with abdominal cramping.  Chest patient drank heavily the night before the first day of symptoms but her symptoms have persisted much longer than she would expect for hangover.  Not tolerating p.o.  No diarrhea.  Is nonbloody nonbilious.  No history of abdominal surgeries.        Prior to Admission Medications   Prescriptions Last Dose Informant Patient Reported? Taking?   albuterol (2.5 mg/3 mL) 0.083 % nebulizer solution   No No   Sig: Take 3 mL (2.5 mg total) by nebulization every 6 (six) hours as needed for wheezing or shortness of breath   Patient not taking: Reported on 10/27/2023   albuterol (PROVENTIL HFA,VENTOLIN HFA) 90 mcg/act inhaler   No No   Sig: Inhale 2 puffs every 4 (four) hours as needed for wheezing   Patient not taking: Reported on 10/27/2023   hydrocortisone 2.5 % lotion   No No   Sig: Apply topically 2 (two) times a day for 14 days      Facility-Administered Medications: None       Past Medical History:   Diagnosis Date    Asthma        No past surgical history on file.    No family history on file.  I have reviewed and agree with the history as documented.    E-Cigarette/Vaping    E-Cigarette Use Former User      E-Cigarette/Vaping Substances     Social History     Tobacco Use    Smoking status: Former     Current packs/day: 0.25     Average packs/day: 0.3 packs/day for 1.3 years (0.3 ttl pk-yrs)     Types: Cigarettes     Start date: 12/29/2022    Smokeless tobacco: Never   Vaping Use    Vaping status: Former   Substance Use Topics    Alcohol use: Yes     Comment: socially    Drug use: Yes     Frequency: 1.0 times per week     Types: Marijuana       Review of Systems   All other systems reviewed and are negative.      Physical Exam  Physical Exam  HENT:      Mouth/Throat:      Mouth: Mucous membranes are moist.    Cardiovascular:      Rate and Rhythm: Normal rate and regular rhythm.      Heart sounds: Normal heart sounds.   Pulmonary:      Effort: Pulmonary effort is normal.      Breath sounds: Normal breath sounds.   Abdominal:      Palpations: Abdomen is soft.      Tenderness: There is no abdominal tenderness. There is no right CVA tenderness or left CVA tenderness.   Skin:     General: Skin is warm and dry.   Neurological:      General: No focal deficit present.      Mental Status: She is alert.   Psychiatric:         Mood and Affect: Mood normal.         Behavior: Behavior normal.         Vital Signs  ED Triage Vitals [04/10/24 1432]   Temperature Pulse Respirations Blood Pressure SpO2   98.3 °F (36.8 °C) 58 18 131/86 100 %      Temp Source Heart Rate Source Patient Position - Orthostatic VS BP Location FiO2 (%)   Oral Monitor -- -- --      Pain Score       5           Vitals:    04/10/24 1432   BP: 131/86   Pulse: 58         Visual Acuity      ED Medications  Medications   sodium chloride 0.9 % bolus 1,000 mL (0 mL Intravenous Stopped 4/10/24 1646)   ondansetron (ZOFRAN) injection 4 mg (4 mg Intravenous Given 4/10/24 1514)   potassium chloride (Klor-Con M20) CR tablet 20 mEq (20 mEq Oral Given 4/10/24 1645)       Diagnostic Studies  Results Reviewed       Procedure Component Value Units Date/Time    CK [[id number]]  (Normal) Collected: 04/10/24 1509    Lab Status: Final result Specimen: Blood from Arm, Right Updated: 04/10/24 1647     Total CK 89 U/L     Comprehensive metabolic panel [[id number]]  (Abnormal) Collected: 04/10/24 1509    Lab Status: Final result Specimen: Blood from Arm, Right Updated: 04/10/24 1558     Sodium 139 mmol/L      Potassium 3.3 mmol/L      Chloride 103 mmol/L      CO2 26 mmol/L      ANION GAP 10 mmol/L      BUN 10 mg/dL      Creatinine 0.53 mg/dL      Glucose 88 mg/dL      Calcium 9.3 mg/dL      AST 41 U/L      ALT 57 U/L      Alkaline Phosphatase 48 U/L      Total Protein 7.6 g/dL      Albumin  4.6 g/dL      Total Bilirubin 0.47 mg/dL      eGFR 134 ml/min/1.73sq m     Narrative:      National Kidney Disease Foundation guidelines for Chronic Kidney Disease (CKD):     Stage 1 with normal or high GFR (GFR > 90 mL/min/1.73 square meters)    Stage 2 Mild CKD (GFR = 60-89 mL/min/1.73 square meters)    Stage 3A Moderate CKD (GFR = 45-59 mL/min/1.73 square meters)    Stage 3B Moderate CKD (GFR = 30-44 mL/min/1.73 square meters)    Stage 4 Severe CKD (GFR = 15-29 mL/min/1.73 square meters)    Stage 5 End Stage CKD (GFR <15 mL/min/1.73 square meters)  Note: GFR calculation is accurate only with a steady state creatinine    Lipase [[id number]]  (Normal) Collected: 04/10/24 1509    Lab Status: Final result Specimen: Blood from Arm, Right Updated: 04/10/24 1558     Lipase 18 u/L     Magnesium [[id number]]  (Normal) Collected: 04/10/24 1509    Lab Status: Final result Specimen: Blood from Arm, Right Updated: 04/10/24 1558     Magnesium 2.0 mg/dL     Urine Microscopic [[id number]]  (Abnormal) Collected: 04/10/24 1507    Lab Status: Final result Specimen: Urine, Clean Catch Updated: 04/10/24 1536     RBC, UA 2-4 /hpf      WBC, UA None Seen /hpf      Epithelial Cells Occasional /hpf      Bacteria, UA None Seen /hpf      MUCUS THREADS Moderate    UA (URINE) with reflex to Scope [[id number]]  (Abnormal) Collected: 04/10/24 1507    Lab Status: Final result Specimen: Urine, Clean Catch Updated: 04/10/24 1524     Color, UA Yellow     Clarity, UA Clear     Specific Gravity, UA 1.020     pH, UA 7.5     Leukocytes, UA Negative     Nitrite, UA Negative     Protein, UA Trace mg/dl      Glucose, UA Negative mg/dl      Ketones, UA Negative mg/dl      Urobilinogen, UA 0.2 E.U./dl      Bilirubin, UA Negative     Occult Blood, UA Trace-Intact    CBC and differential [[id number]]  (Abnormal) Collected: 04/10/24 1509    Lab Status: Final result Specimen: Blood from Arm, Right Updated: 04/10/24 1520     WBC 11.21 Thousand/uL      RBC 4.80  Million/uL      Hemoglobin 13.3 g/dL      Hematocrit 41.4 %      MCV 86 fL      MCH 27.7 pg      MCHC 32.1 g/dL      RDW 13.4 %      MPV 10.1 fL      Platelets 315 Thousands/uL      nRBC 0 /100 WBCs      Segmented % 65 %      Immature Grans % 0 %      Lymphocytes % 26 %      Monocytes % 9 %      Eosinophils Relative 0 %      Basophils Relative 0 %      Absolute Neutrophils 7.24 Thousands/µL      Absolute Immature Grans 0.03 Thousand/uL      Absolute Lymphocytes 2.94 Thousands/µL      Absolute Monocytes 0.96 Thousand/µL      Eosinophils Absolute 0.01 Thousand/µL      Basophils Absolute 0.03 Thousands/µL     POCT pregnancy, urine [[id number]]  (Normal) Resulted: 04/10/24 1513    Lab Status: Final result Updated: 04/10/24 1513     EXT Preg Test, Ur Negative     Control Valid                   No orders to display              Procedures  Procedures         ED Course         23-year-old female with 3 days of vomiting and abdominal pain after drinking heavily.  Patient is well-appearing with normal vitals.  Moist mucous membranes.  Soft abdomen with no rebound or guarding.  No CVA tenderness.  IV fluids and Zofran given.  Negative hCG.  Normal CBC and BMP with mildly low potassium which was repleted orally.  Normal lipase.  Normal UA.  No indication for further testing.  Patient is tolerating p.o.  Stable for discharge.                      SBIRT 22yo+      Flowsheet Row Most Recent Value   Initial Alcohol Screen: US AUDIT-C     1. How often do you have a drink containing alcohol? 1 Filed at: 04/10/2024 1434   2. How many drinks containing alcohol do you have on a typical day you are drinking?  0 Filed at: 04/10/2024 1434   3a. Male UNDER 65: How often do you have five or more drinks on one occasion? 0 Filed at: 04/10/2024 1434   3b. FEMALE Any Age, or MALE 65+: How often do you have 4 or more drinks on one occassion? 0 Filed at: 04/10/2024 1434   Audit-C Score 1 Filed at: 04/10/2024 1434   CAROLANN: How many times in the  past year have you...    Used an illegal drug or used a prescription medication for non-medical reasons? Never Filed at: 04/10/2024 1434                      Medical Decision Making  Amount and/or Complexity of Data Reviewed  Labs: ordered.    Risk  Prescription drug management.             Disposition  Final diagnoses:   Nausea and vomiting     Time reflects when diagnosis was documented in both MDM as applicable and the Disposition within this note       Time User Action Codes Description Comment    4/10/2024  4:17 PM Katie Munson Add [R11.2] Nausea and vomiting           ED Disposition       ED Disposition   Discharge    Condition   Stable    Date/Time   Wed Apr 10, 2024 1641    Comment   Evonne Seth discharge to home/self care.                   Follow-up Information    None         Discharge Medication List as of 4/10/2024  4:43 PM        START taking these medications    Details   ondansetron (ZOFRAN-ODT) 4 mg disintegrating tablet Take 1 tablet (4 mg total) by mouth every 6 (six) hours as needed for vomiting, Starting Wed 4/10/2024, Normal           CONTINUE these medications which have NOT CHANGED    Details   albuterol (2.5 mg/3 mL) 0.083 % nebulizer solution Take 3 mL (2.5 mg total) by nebulization every 6 (six) hours as needed for wheezing or shortness of breath, Starting Sat 4/23/2022, Normal      albuterol (PROVENTIL HFA,VENTOLIN HFA) 90 mcg/act inhaler Inhale 2 puffs every 4 (four) hours as needed for wheezing, Starting Fri 12/6/2019, Print      hydrocortisone 2.5 % lotion Apply topically 2 (two) times a day for 14 days, Starting Thu 12/29/2022, Until Thu 1/12/2023, Normal             No discharge procedures on file.    PDMP Review       None            ED Provider  Electronically Signed by             Katie Munson MD  04/16/24 1669

## 2024-04-19 ENCOUNTER — HOSPITAL ENCOUNTER (EMERGENCY)
Facility: HOSPITAL | Age: 24
Discharge: HOME | End: 2024-04-19
Attending: EMERGENCY MEDICINE

## 2024-04-19 VITALS
TEMPERATURE: 99.1 F | DIASTOLIC BLOOD PRESSURE: 62 MMHG | SYSTOLIC BLOOD PRESSURE: 111 MMHG | OXYGEN SATURATION: 95 % | RESPIRATION RATE: 18 BRPM | HEART RATE: 101 BPM

## 2024-04-19 DIAGNOSIS — J02.0 STREP PHARYNGITIS: Primary | ICD-10-CM

## 2024-04-19 PROCEDURE — 99284 EMERGENCY DEPT VISIT MOD MDM: CPT | Performed by: PHYSICIAN ASSISTANT

## 2024-04-19 PROCEDURE — 99282 EMERGENCY DEPT VISIT SF MDM: CPT

## 2024-04-19 RX ORDER — PREDNISONE 20 MG/1
60 TABLET ORAL ONCE
Status: COMPLETED | OUTPATIENT
Start: 2024-04-19 | End: 2024-04-19

## 2024-04-19 RX ORDER — AMOXICILLIN 500 MG/1
500 CAPSULE ORAL 2 TIMES DAILY
Qty: 20 CAPSULE | Refills: 0 | Status: SHIPPED | OUTPATIENT
Start: 2024-04-19 | End: 2024-04-29

## 2024-04-19 RX ORDER — IBUPROFEN 600 MG/1
600 TABLET ORAL ONCE
Status: COMPLETED | OUTPATIENT
Start: 2024-04-19 | End: 2024-04-19

## 2024-04-19 RX ADMIN — PREDNISONE 60 MG: 20 TABLET ORAL at 12:49

## 2024-04-19 RX ADMIN — IBUPROFEN 600 MG: 600 TABLET, FILM COATED ORAL at 12:49

## 2024-04-19 NOTE — DISCHARGE INSTRUCTIONS
Use Tylenol every 4 hours or Motrin every 6 hours; you can alternate the 2 medications taking something every 3 hours for pain or fever.    Take all oral antibiotics until done.    You can use over-the-counter antihistamines like Claritin, Zyrtec with Flonase for nasal congestion symptoms/  If no improvement follow-up with your doctor in next few days.

## 2024-04-19 NOTE — ED PROVIDER NOTES
History  Chief Complaint   Patient presents with    Sore Throat     Started 4 days ago. Pt states she has a headache as well     HPI: Patient is a 23 y.o. female who presents with 4 days of fever, Tmax 102, chills, intermittent HA (none at present) and  persistent  sore throat,  trouble swallowing which the patient describes at mild.  The patient has had contact with people with similar symptoms.    NO meds today, but has been using Ibuprofen, dayquil.  Sister here also being seen for similar sx     -- Tylenol [Acetaminophen] -- Throat Swelling    Past Medical History:  No date: Asthma   History reviewed. No pertinent surgical history.  Social History    Tobacco Use      Smoking status: Some Days        Packs/day: 0.25        Years: 0.3 packs/day for 1.3 years (0.3 ttl pk-yrs)        Types: Cigarettes        Start date: 12/29/2022      Smokeless tobacco: Never    Vaping Use      Vaping status: Some Days        Substances: Nicotine    Alcohol use: Yes      Comment: socially    Drug use: Yes      Frequency: 1.0 times per week      Types: Marijuana      Nursing notes reviewed  Physical Exam:  ED Triage Vitals  Temperature: 99.1 °F (37.3 °C) [04/19/24 1221]  Pulse: 101 [04/19/24 1221]  Respirations: 18 [04/19/24 1221]  Blood Pressure: 111/62 [04/19/24 1221]  SpO2: 95 % [04/19/24 1221]  Temp Source: Oral [04/19/24 1221]  Heart Rate Source: Monitor [04/19/24 1221]  Patient Position - Orthostatic VS: Sitting [04/19/24 1221]  BP Location: Right arm [04/19/24 1221]  FiO2 (%): n/a  Pain Score: 7 [04/19/24 1223]    ROS: Positive for fever, Tmax 102, chills, HA, sore throat,  trouble swallowing,the remainder of a 10 organ system ROS was otherwise unremarkable.    General: awake, alert, no acute distress  Head: normocephalic, atraumatic  Eyes: no scleral icterus  Ears: external ears normal, hearing grossly intact, TM intact B/L no erythema, no bulging, intact  Pharynx: + erythema/posterior pharyngeal petechiae, slight exudate,  uvula midline no shift, no sign of abscess  Nose: external exam grossly normal, positive nasal discharge  Neck: symmetric, No JVD noted, trachea midline, no lymphadenopathy  Pulmonary: no respiratory distress, no tachypnea noted, no wheezing no rhonchi  Cardiovascular: appears well perfused  Abdomen: no distention noted  Musculoskeletal: no deformities noted, tone normal  Neuro: grossly non-focal  Psych: mood and affect appropriate    The patient is stable and has a history and physical exam consistent with strep pharyngitis.  I considered the patient's other medical conditions as applicable/noted above in my medical decision making.  The patient is stable upon discharge. The plan is for supportive care at home.    The patient (and any family present) verbalized understanding of the discharge instructions and warnings that would necessitate return to the Emergency Department.  All questions were answered prior to discharge.    Medications  ibuprofen (MOTRIN) tablet 600 mg (has no administration in time range)  predniSONE tablet 60 mg (has no administration in time range)  Final diagnoses:  Strep pharyngitis  Time reflects when diagnosis was documented in both MDM as applicable and   the Disposition within this note     Time User Action Codes Description Comment    4/19/2024 12:36 PM Medina Colon [J02.0] Strep pharyngitis       ED Disposition     ED Disposition   Discharge    Condition   Stable    Date/Time   Fri Apr 19, 2024 12:36 PM    Comment   Evonne Seth discharge to home/self care.               Follow-up Information    None     Patient's Medications    Discharge Prescriptions  AMOXICILLIN (AMOXIL) 500 MG CAPSULE     Take 1 capsule (500 mg total) by mouth 2 (two) times a day for 10 days     Start Date: 4/19/2024 End Date: 4/29/2024     Order Dose: 500 mg     Quantity: 20 capsule     Refills: 0    No discharge procedures on file.    Electronically Signed by          Prior to Admission Medications    Prescriptions Last Dose Informant Patient Reported? Taking?   hydrocortisone 2.5 % lotion   No No   Sig: Apply topically 2 (two) times a day for 14 days   ondansetron (ZOFRAN-ODT) 4 mg disintegrating tablet   No No   Sig: Take 1 tablet (4 mg total) by mouth every 6 (six) hours as needed for vomiting      Facility-Administered Medications: None       Past Medical History:   Diagnosis Date    Asthma        History reviewed. No pertinent surgical history.    History reviewed. No pertinent family history.  I have reviewed and agree with the history as documented.    E-Cigarette/Vaping    E-Cigarette Use Current Some Day User      E-Cigarette/Vaping Substances    Nicotine Yes      Social History     Tobacco Use    Smoking status: Some Days     Current packs/day: 0.25     Average packs/day: 0.3 packs/day for 1.3 years (0.3 ttl pk-yrs)     Types: Cigarettes     Start date: 12/29/2022    Smokeless tobacco: Never   Vaping Use    Vaping status: Some Days    Substances: Nicotine   Substance Use Topics    Alcohol use: Yes     Comment: socially    Drug use: Yes     Frequency: 1.0 times per week     Types: Marijuana       Review of Systems    Physical Exam  Physical Exam    Vital Signs  ED Triage Vitals   Temperature Pulse Respirations Blood Pressure SpO2   04/19/24 1221 04/19/24 1221 04/19/24 1221 04/19/24 1221 04/19/24 1221   99.1 °F (37.3 °C) 101 18 111/62 95 %      Temp Source Heart Rate Source Patient Position - Orthostatic VS BP Location FiO2 (%)   04/19/24 1221 04/19/24 1221 04/19/24 1221 04/19/24 1221 --   Oral Monitor Sitting Right arm       Pain Score       04/19/24 1223       7           Vitals:    04/19/24 1221   BP: 111/62   Pulse: 101   Patient Position - Orthostatic VS: Sitting         Visual Acuity      ED Medications  Medications   ibuprofen (MOTRIN) tablet 600 mg (600 mg Oral Given 4/19/24 1249)   predniSONE tablet 60 mg (60 mg Oral Given 4/19/24 1249)       Diagnostic Studies  Results Reviewed       None                    No orders to display              Procedures  Procedures         ED Course                                             Medical Decision Making           Disposition  Final diagnoses:   Strep pharyngitis     Time reflects when diagnosis was documented in both MDM as applicable and the Disposition within this note       Time User Action Codes Description Comment    4/19/2024 12:36 PM Medina Colon [J02.0] Strep pharyngitis           ED Disposition       ED Disposition   Discharge    Condition   Stable    Date/Time   Fri Apr 19, 2024 12:36 PM    Comment   Evonne Seth discharge to home/self care.                   Follow-up Information    None         Discharge Medication List as of 4/19/2024 12:41 PM        START taking these medications    Details   amoxicillin (AMOXIL) 500 mg capsule Take 1 capsule (500 mg total) by mouth 2 (two) times a day for 10 days, Starting Fri 4/19/2024, Until Mon 4/29/2024, Normal           CONTINUE these medications which have NOT CHANGED    Details   hydrocortisone 2.5 % lotion Apply topically 2 (two) times a day for 14 days, Starting Thu 12/29/2022, Until Thu 1/12/2023, Normal      ondansetron (ZOFRAN-ODT) 4 mg disintegrating tablet Take 1 tablet (4 mg total) by mouth every 6 (six) hours as needed for vomiting, Starting Wed 4/10/2024, Normal             No discharge procedures on file.    PDMP Review       None            ED Provider  Electronically Signed by             Medina Colon PA-C  04/19/24 0270

## 2024-08-31 ENCOUNTER — HOSPITAL ENCOUNTER (EMERGENCY)
Facility: HOSPITAL | Age: 24
Discharge: HOME | End: 2024-08-31
Attending: EMERGENCY MEDICINE

## 2024-08-31 VITALS
DIASTOLIC BLOOD PRESSURE: 87 MMHG | RESPIRATION RATE: 18 BRPM | WEIGHT: 130.51 LBS | OXYGEN SATURATION: 100 % | HEART RATE: 55 BPM | BODY MASS INDEX: 21.74 KG/M2 | TEMPERATURE: 98.7 F | SYSTOLIC BLOOD PRESSURE: 123 MMHG | HEIGHT: 65 IN

## 2024-08-31 DIAGNOSIS — K08.89 PAIN, DENTAL: Primary | ICD-10-CM

## 2024-08-31 PROCEDURE — 99284 EMERGENCY DEPT VISIT MOD MDM: CPT | Performed by: EMERGENCY MEDICINE

## 2024-08-31 PROCEDURE — 96372 THER/PROPH/DIAG INJ SC/IM: CPT

## 2024-08-31 PROCEDURE — 99282 EMERGENCY DEPT VISIT SF MDM: CPT

## 2024-08-31 RX ORDER — KETOROLAC TROMETHAMINE 30 MG/ML
15 INJECTION, SOLUTION INTRAMUSCULAR; INTRAVENOUS ONCE
Status: DISCONTINUED | OUTPATIENT
Start: 2024-08-31 | End: 2024-08-31

## 2024-08-31 RX ORDER — KETOROLAC TROMETHAMINE 30 MG/ML
15 INJECTION, SOLUTION INTRAMUSCULAR; INTRAVENOUS ONCE
Status: COMPLETED | OUTPATIENT
Start: 2024-08-31 | End: 2024-08-31

## 2024-08-31 RX ORDER — OXYCODONE AND ACETAMINOPHEN 5; 325 MG/1; MG/1
1 TABLET ORAL ONCE
Status: COMPLETED | OUTPATIENT
Start: 2024-08-31 | End: 2024-08-31

## 2024-08-31 RX ORDER — NAPROXEN 500 MG/1
500 TABLET ORAL 2 TIMES DAILY WITH MEALS
Qty: 42 TABLET | Refills: 0 | Status: SHIPPED | OUTPATIENT
Start: 2024-08-31 | End: 2024-09-21

## 2024-08-31 RX ADMIN — OXYCODONE HYDROCHLORIDE AND ACETAMINOPHEN 1 TABLET: 5; 325 TABLET ORAL at 17:52

## 2024-08-31 RX ADMIN — AMOXICILLIN AND CLAVULANATE POTASSIUM 1 TABLET: 875; 125 TABLET, FILM COATED ORAL at 17:52

## 2024-08-31 RX ADMIN — KETOROLAC TROMETHAMINE 15 MG: 30 INJECTION, SOLUTION INTRAMUSCULAR at 17:53

## 2024-08-31 NOTE — DISCHARGE INSTRUCTIONS
Please take one tablet of Augmentin every 12 hours for 7 days.      Please follow up with a dental clinic from the list provided.      It was my pleasure taking care of you during your ED visit.  I hope you feel better soon!

## 2024-08-31 NOTE — ED PROVIDER NOTES
"History  Chief Complaint   Patient presents with    Dental Pain     PT \"I have been having whole mouth pain for like the past few days. I dont have insurance until the 3rd but I can't wait any longer. I have been drinking Motrin and Tylenol the past few days but nothing is working\"      24 yo F presented for left sided sharp and throbbing dental pain that started 4 days ago.  She states it feels like her wisdom tooth on her lower left is ripping though her gum. She has been taking Motrin since yesterday every 6 hours 400mg x 4 pills which did not relief her pain; Orajel did not help either.  No surgical hx.          History provided by:  Patient   used: No    Dental Pain  Location:  Lower  Associated symptoms: no fever        Prior to Admission Medications   Prescriptions Last Dose Informant Patient Reported? Taking?   hydrocortisone 2.5 % lotion   No No   Sig: Apply topically 2 (two) times a day for 14 days   ondansetron (ZOFRAN-ODT) 4 mg disintegrating tablet Not Taking  No No   Sig: Take 1 tablet (4 mg total) by mouth every 6 (six) hours as needed for vomiting   Patient not taking: Reported on 8/31/2024      Facility-Administered Medications: None       Past Medical History:   Diagnosis Date    Asthma        History reviewed. No pertinent surgical history.    History reviewed. No pertinent family history.  I have reviewed and agree with the history as documented.    E-Cigarette/Vaping    E-Cigarette Use Never User      E-Cigarette/Vaping Substances    Nicotine Yes      Social History     Tobacco Use    Smoking status: Former     Current packs/day: 0.25     Average packs/day: 0.3 packs/day for 1.7 years (0.4 ttl pk-yrs)     Types: Cigarettes     Start date: 12/29/2022    Smokeless tobacco: Never   Vaping Use    Vaping status: Never Used   Substance Use Topics    Alcohol use: Yes     Comment: socially    Drug use: Yes     Frequency: 1.0 times per week     Types: Marijuana     Comment: last used; " 8/31/24        Review of Systems   Constitutional:  Negative for chills, fatigue and fever.   HENT:  Positive for dental problem.    Respiratory:  Negative for chest tightness and shortness of breath.    Cardiovascular:  Negative for chest pain.   Gastrointestinal:  Negative for abdominal distention, abdominal pain, diarrhea, nausea, rectal pain and vomiting.   Skin:  Negative for color change and pallor.       Physical Exam  ED Triage Vitals [08/31/24 1708]   Temperature Pulse Respirations Blood Pressure SpO2   98.7 °F (37.1 °C) 55 18 123/87 100 %      Temp Source Heart Rate Source Patient Position - Orthostatic VS BP Location FiO2 (%)   Oral Monitor Sitting Left arm --      Pain Score       9             Orthostatic Vital Signs  Vitals:    08/31/24 1708   BP: 123/87   Pulse: 55   Patient Position - Orthostatic VS: Sitting       Physical Exam  Vitals and nursing note reviewed.   Constitutional:       General: She is not in acute distress.     Appearance: She is not ill-appearing.   HENT:      Head: Normocephalic and atraumatic.      Mouth/Throat:      Mouth: Mucous membranes are moist.      Pharynx: No oropharyngeal exudate or posterior oropharyngeal erythema.      Comments: -No left lower wisdom tooth rupture noted, white tissue overlying area   Eyes:      Extraocular Movements: Extraocular movements intact.      Pupils: Pupils are equal, round, and reactive to light.   Cardiovascular:      Rate and Rhythm: Normal rate and regular rhythm.      Pulses: Normal pulses.      Heart sounds: Normal heart sounds.   Pulmonary:      Effort: Pulmonary effort is normal.      Breath sounds: Normal breath sounds.   Abdominal:      General: Abdomen is flat. There is no distension.      Palpations: Abdomen is soft.      Tenderness: There is no abdominal tenderness.   Skin:     General: Skin is warm and dry.      Capillary Refill: Capillary refill takes less than 2 seconds.      Coloration: Skin is not jaundiced.   Neurological:       Mental Status: She is alert and oriented to person, place, and time.   Psychiatric:         Mood and Affect: Mood normal.         Behavior: Behavior normal.         ED Medications  Medications   ketorolac (TORADOL) injection 15 mg (15 mg Intramuscular Given 8/31/24 1753)   oxyCODONE-acetaminophen (PERCOCET) 5-325 mg per tablet 1 tablet (1 tablet Oral Given 8/31/24 1752)   amoxicillin-clavulanate (AUGMENTIN) 875-125 mg per tablet 1 tablet (1 tablet Oral Given 8/31/24 1752)       Diagnostic Studies  Results Reviewed       None                 No orders to display         Procedures  Procedures      ED Course         Medical Decision Making  24 yo F presented for left sided sharp and throbbing dental pain that started 4 days ago.  ED workup included providing pt with pain regimen of one dose of percocet and Toradol.  On exam, no notable infection seen; however, pt was provided with 7 day course of Augmentin for empiric treatment and information on local dental clinics. She is medically stable for discharge.       Risk  Prescription drug management.      Disposition  Final diagnoses:   Pain, dental     Time reflects when diagnosis was documented in both MDM as applicable and the Disposition within this note       Time User Action Codes Description Comment    8/31/2024  5:33 PM Missy Mckeon Add [K08.89] Pain, dental     8/31/2024  5:33 PM Missy Mckeon Remove [K08.89] Pain, dental     8/31/2024  5:34 PM Missy Mckeon Add [K08.89] Pain, dental           ED Disposition       ED Disposition   Discharge    Condition   Stable    Date/Time   Sat Aug 31, 2024  5:35 PM    Comment   Evonne Seth discharge to home/self care.                   Follow-up Information       Follow up With Specialties Details Why Contact Info Additional Information    [facility]    [address] [facility] [address]  [phone]     [facility]  Go to  If symptoms  worsen, As needed [address] [place] [address] [address]  [facility] [address] [address]  [phone]     [facility]    [address] [facility] [address]  [phone]     [facility] Emergency Medicine   [address] [id number] [phone] [facility] Emergency Department, [address] [address]            Discharge Medication List as of 8/31/2024  5:42 PM        START taking these medications    Details   amoxicillin-clavulanate (AUGMENTIN) 875-125 mg per tablet Take 1 tablet by mouth every 12 (twelve) hours for 7 days, Starting Sat 8/31/2024, Until Sat 9/7/2024, Normal      naproxen (Naprosyn) 500 mg tablet Take 1 tablet (500 mg total) by mouth 2 (two) times a day with meals for 21 days, Starting Sat 8/31/2024, Until Sat 9/21/2024, Normal           CONTINUE these medications which have NOT CHANGED    Details   hydrocortisone 2.5 % lotion Apply topically 2 (two) times a day for 14 days, Starting Thu 12/29/2022, Until Thu 1/12/2023, Normal      ondansetron (ZOFRAN-ODT) 4 mg disintegrating tablet Take 1 tablet (4 mg total) by mouth every 6 (six) hours as needed for vomiting, Starting Wed 4/10/2024, Normal           No discharge procedures on file.    PDMP Review       None             ED Provider  Attending physically available and evaluated Evonne Seth. I managed the patient along with the ED Attending.    Electronically Signed by           Missy Mckeon DO  08/31/24 5180

## 2024-11-02 ENCOUNTER — HOSPITAL ENCOUNTER (EMERGENCY)
Facility: HOSPITAL | Age: 24
Discharge: HOME | End: 2024-11-02
Attending: EMERGENCY MEDICINE

## 2024-11-02 VITALS
TEMPERATURE: 98.3 F | RESPIRATION RATE: 18 BRPM | HEART RATE: 78 BPM | SYSTOLIC BLOOD PRESSURE: 116 MMHG | OXYGEN SATURATION: 100 % | DIASTOLIC BLOOD PRESSURE: 59 MMHG

## 2024-11-02 DIAGNOSIS — R11.0 NAUSEA: ICD-10-CM

## 2024-11-02 DIAGNOSIS — K52.9 GASTROENTERITIS: Primary | ICD-10-CM

## 2024-11-02 LAB
ALBUMIN SERPL BCG-MCNC: 4.2 G/DL (ref 3.5–5)
ALP SERPL-CCNC: 51 U/L (ref 34–104)
ALT SERPL W P-5'-P-CCNC: 10 U/L (ref 7–52)
ANION GAP SERPL CALCULATED.3IONS-SCNC: 8 MMOL/L (ref 4–13)
AST SERPL W P-5'-P-CCNC: 17 U/L (ref 13–39)
BASOPHILS # BLD AUTO: 0.05 THOUSANDS/ΜL (ref 0–0.1)
BASOPHILS NFR BLD AUTO: 1 % (ref 0–1)
BILIRUB SERPL-MCNC: 0.27 MG/DL (ref 0.2–1)
BUN SERPL-MCNC: 11 MG/DL (ref 5–25)
CALCIUM SERPL-MCNC: 9 MG/DL (ref 8.4–10.2)
CHLORIDE SERPL-SCNC: 108 MMOL/L (ref 96–108)
CO2 SERPL-SCNC: 23 MMOL/L (ref 21–32)
CREAT SERPL-MCNC: 0.59 MG/DL (ref 0.6–1.3)
EOSINOPHIL # BLD AUTO: 0.3 THOUSAND/ΜL (ref 0–0.61)
EOSINOPHIL NFR BLD AUTO: 4 % (ref 0–6)
ERYTHROCYTE [DISTWIDTH] IN BLOOD BY AUTOMATED COUNT: 13.7 % (ref 11.6–15.1)
GFR SERPL CREATININE-BSD FRML MDRD: 129 ML/MIN/1.73SQ M
GLUCOSE SERPL-MCNC: 90 MG/DL (ref 65–140)
HCG SERPL QL: NEGATIVE
HCT VFR BLD AUTO: 42.8 % (ref 34.8–46.1)
HGB BLD-MCNC: 13.3 G/DL (ref 11.5–15.4)
IMM GRANULOCYTES # BLD AUTO: 0.01 THOUSAND/UL (ref 0–0.2)
IMM GRANULOCYTES NFR BLD AUTO: 0 % (ref 0–2)
LIPASE SERPL-CCNC: 21 U/L (ref 11–82)
LYMPHOCYTES # BLD AUTO: 3.01 THOUSANDS/ΜL (ref 0.6–4.47)
LYMPHOCYTES NFR BLD AUTO: 38 % (ref 14–44)
MCH RBC QN AUTO: 27.7 PG (ref 26.8–34.3)
MCHC RBC AUTO-ENTMCNC: 31.1 G/DL (ref 31.4–37.4)
MCV RBC AUTO: 89 FL (ref 82–98)
MONOCYTES # BLD AUTO: 0.47 THOUSAND/ΜL (ref 0.17–1.22)
MONOCYTES NFR BLD AUTO: 6 % (ref 4–12)
NEUTROPHILS # BLD AUTO: 4.06 THOUSANDS/ΜL (ref 1.85–7.62)
NEUTS SEG NFR BLD AUTO: 51 % (ref 43–75)
NRBC BLD AUTO-RTO: 0 /100 WBCS
PLATELET # BLD AUTO: 235 THOUSANDS/UL (ref 149–390)
PMV BLD AUTO: 9.8 FL (ref 8.9–12.7)
POTASSIUM SERPL-SCNC: 3.7 MMOL/L (ref 3.5–5.3)
PROT SERPL-MCNC: 6.3 G/DL (ref 6.4–8.4)
RBC # BLD AUTO: 4.8 MILLION/UL (ref 3.81–5.12)
SODIUM SERPL-SCNC: 139 MMOL/L (ref 135–147)
WBC # BLD AUTO: 7.9 THOUSAND/UL (ref 4.31–10.16)

## 2024-11-02 PROCEDURE — 85025 COMPLETE CBC W/AUTO DIFF WBC: CPT

## 2024-11-02 PROCEDURE — 84703 CHORIONIC GONADOTROPIN ASSAY: CPT

## 2024-11-02 PROCEDURE — 96361 HYDRATE IV INFUSION ADD-ON: CPT

## 2024-11-02 PROCEDURE — 99284 EMERGENCY DEPT VISIT MOD MDM: CPT | Performed by: EMERGENCY MEDICINE

## 2024-11-02 PROCEDURE — 96374 THER/PROPH/DIAG INJ IV PUSH: CPT

## 2024-11-02 PROCEDURE — 36415 COLL VENOUS BLD VENIPUNCTURE: CPT

## 2024-11-02 PROCEDURE — 83690 ASSAY OF LIPASE: CPT

## 2024-11-02 PROCEDURE — 80053 COMPREHEN METABOLIC PANEL: CPT

## 2024-11-02 PROCEDURE — 99283 EMERGENCY DEPT VISIT LOW MDM: CPT

## 2024-11-02 RX ORDER — ONDANSETRON 4 MG/1
4 TABLET, ORALLY DISINTEGRATING ORAL EVERY 6 HOURS PRN
Qty: 20 TABLET | Refills: 0 | Status: SHIPPED | OUTPATIENT
Start: 2024-11-02 | End: 2024-11-02

## 2024-11-02 RX ORDER — ONDANSETRON 4 MG/1
4 TABLET, ORALLY DISINTEGRATING ORAL EVERY 6 HOURS PRN
Qty: 20 TABLET | Refills: 0 | Status: SHIPPED | OUTPATIENT
Start: 2024-11-02

## 2024-11-02 RX ORDER — ONDANSETRON 2 MG/ML
4 INJECTION INTRAMUSCULAR; INTRAVENOUS ONCE
Status: COMPLETED | OUTPATIENT
Start: 2024-11-02 | End: 2024-11-02

## 2024-11-02 RX ADMIN — SODIUM CHLORIDE 1000 ML: 0.9 INJECTION, SOLUTION INTRAVENOUS at 17:29

## 2024-11-02 RX ADMIN — ONDANSETRON 4 MG: 2 INJECTION INTRAMUSCULAR; INTRAVENOUS at 17:29

## 2024-11-02 NOTE — ED PROVIDER NOTES
Time reflects when diagnosis was documented in both MDM as applicable and the Disposition within this note       Time User Action Codes Description Comment    11/2/2024  6:45 PM Kayla Stewart [K52.9] Gastroenteritis     11/2/2024  6:45 PM Kayla Stewart [R11.0] Nausea           ED Disposition       ED Disposition   Discharge    Condition   Stable    Date/Time   Sat Nov 2, 2024  6:45 PM    Comment   Evonne Seth discharge to home/self care.                   Assessment & Plan       Medical Decision Making  Amount and/or Complexity of Data Reviewed  Labs: ordered. Decision-making details documented in ED Course.    Risk  Prescription drug management.      See ED course for MDM.    ED Course as of 11/02/24 2029   Sat Nov 02, 2024   1725 DDx includes but not limited to:  dehydration, viral illness, gastroenteritis   1807 PREGNANCY, SERUM: Negative  negative   1807 LIPASE: 21  WNL   1832 Evaluated patient, nausea is improved.  Will p.o. challenge patient   1850 Reevaluated patient, patient is tolerating p.o. and feels much improved.  Patient will need to follow-up with primary care for further management of her symptoms.  Suspect patient's symptoms are due to gastroenteritis and dehydration.  Since patient appears well and is tolerating fluids now, will send Zofran to help with nausea to the pharmacy.  Return precautions discussed.  Patient voices understanding agrees with plan.  All questions and concerns addressed at this time.       Medications   sodium chloride 0.9 % bolus 1,000 mL (0 mL Intravenous Stopped 11/2/24 1851)   ondansetron (ZOFRAN) injection 4 mg (4 mg Intravenous Given 11/2/24 1729)       ED Risk Strat Scores                                               History of Present Illness       Chief Complaint   Patient presents with    Nausea     Nausea since last night; reports episodes of sweating; reports diarrhea;        Past Medical History:   Diagnosis Date    Asthma       No past surgical history  on file.   No family history on file.   Social History     Tobacco Use    Smoking status: Former     Current packs/day: 0.25     Average packs/day: 0.3 packs/day for 1.8 years (0.5 ttl pk-yrs)     Types: Cigarettes     Start date: 12/29/2022    Smokeless tobacco: Never   Vaping Use    Vaping status: Never Used   Substance Use Topics    Alcohol use: Yes     Comment: socially    Drug use: Yes     Frequency: 1.0 times per week     Types: Marijuana     Comment: last used; 8/31/24      E-Cigarette/Vaping    E-Cigarette Use Never User       E-Cigarette/Vaping Substances    Nicotine Yes       I have reviewed and agree with the history as documented.     HPI  (Evonne Seth) Evonne Seth is a 23 y.o. female presents to the emergency department on November 2, 2024 for nausea onset yesterday.  Patient reports she started with nausea yesterday as well as diarrhea that got worse today.  Reports she she had 7-8 episodes of watery, nonbloody diarrhea, as well as some dizziness with movement resulting in 1 episode of nonbloody nonbilious emesis today.  Denies sick contacts at home.  Reports chills, denies chest pain, shortness of breath, abdominal pain, urinary symptoms, or any other complaint at this time.      Allergies include:  No Known Allergies      Immunizations:    There is no immunization history on file for this patient.  Immunizations Reviewed.    Review of Systems   Constitutional:  Negative for activity change, fever and unexpected weight change.   Respiratory:  Negative for cough, chest tightness and shortness of breath.    Cardiovascular:  Negative for chest pain and palpitations.   Gastrointestinal:  Positive for diarrhea, nausea and vomiting. Negative for abdominal pain and blood in stool.   Genitourinary:  Negative for dysuria and hematuria.   Skin:  Negative for wound.   Allergic/Immunologic: Negative for immunocompromised state.   Neurological:  Negative for syncope and weakness.   All other systems  reviewed and are negative.          Objective       ED Triage Vitals [11/02/24 1643]   Temperature Pulse Blood Pressure Respirations SpO2 Patient Position - Orthostatic VS   98.3 °F (36.8 °C) 78 116/59 18 100 % Sitting      Temp Source Heart Rate Source BP Location FiO2 (%) Pain Score    Oral Monitor Left arm -- --      Vitals      Date and Time Temp Pulse SpO2 Resp BP Pain Score FACES Pain Rating User   11/02/24 1643 98.3 °F (36.8 °C) 78 100 % 18 116/59 -- -- ALG            Physical Exam  Vitals and nursing note reviewed.   Constitutional:       Appearance: She is not toxic-appearing or diaphoretic.   HENT:      Head: Normocephalic and atraumatic.      Right Ear: External ear normal.      Left Ear: External ear normal.      Nose: Nose normal.      Mouth/Throat:      Mouth: Mucous membranes are dry.   Eyes:      General: No scleral icterus.     Extraocular Movements: Extraocular movements intact.      Conjunctiva/sclera: Conjunctivae normal.   Cardiovascular:      Rate and Rhythm: Normal rate and regular rhythm.      Pulses: Normal pulses.           Radial pulses are 2+ on the right side.        Dorsalis pedis pulses are 2+ on the right side and 2+ on the left side.      Heart sounds: Normal heart sounds, S1 normal and S2 normal. No murmur heard.  Pulmonary:      Effort: Pulmonary effort is normal. No respiratory distress.      Breath sounds: Normal breath sounds. No stridor. No wheezing.   Abdominal:      General: Bowel sounds are normal.      Palpations: Abdomen is soft.      Tenderness: There is no abdominal tenderness. There is no right CVA tenderness, left CVA tenderness, guarding or rebound.   Musculoskeletal:         General: Normal range of motion.      Cervical back: Normal range of motion.   Skin:     General: Skin is warm and dry.   Neurological:      General: No focal deficit present.      Mental Status: She is alert and oriented to person, place, and time.   Psychiatric:         Mood and Affect: Mood  normal.         Results Reviewed       Procedure Component Value Units Date/Time    hCG, qualitative pregnancy [[id number]]  (Normal) Collected: 11/02/24 1726    Lab Status: Final result Specimen: Blood from Arm, Right Updated: 11/02/24 1753     Preg, Serum Negative    Comprehensive metabolic panel [[id number]]  (Abnormal) Collected: 11/02/24 1726    Lab Status: Final result Specimen: Blood from Arm, Right Updated: 11/02/24 1747     Sodium 139 mmol/L      Potassium 3.7 mmol/L      Chloride 108 mmol/L      CO2 23 mmol/L      ANION GAP 8 mmol/L      BUN 11 mg/dL      Creatinine 0.59 mg/dL      Glucose 90 mg/dL      Calcium 9.0 mg/dL      AST 17 U/L      ALT 10 U/L      Alkaline Phosphatase 51 U/L      Total Protein 6.3 g/dL      Albumin 4.2 g/dL      Total Bilirubin 0.27 mg/dL      eGFR 129 ml/min/1.73sq m     Narrative:      National Kid[employer] guidelines for Chronic Kidney Disease (CKD):     Stage 1 with normal or high GFR (GFR > 90 mL/min/1.73 square meters)    Stage 2 Mild CKD (GFR = 60-89 mL/min/1.73 square meters)    Stage 3A Moderate CKD (GFR = 45-59 mL/min/1.73 square meters)    Stage 3B Moderate CKD (GFR = 30-44 mL/min/1.73 square meters)    Stage 4 Severe CKD (GFR = 15-29 mL/min/1.73 square meters)    Stage 5 End Stage CKD (GFR <15 mL/min/1.73 square meters)  Note: GFR calculation is accurate only with a steady state creatinine    Lipase [[id number]]  (Normal) Collected: 11/02/24 1726    Lab Status: Final result Specimen: Blood from Arm, Right Updated: 11/02/24 1747     Lipase 21 u/L     CBC and differential [[id number]]  (Abnormal) Collected: 11/02/24 1726    Lab Status: Final result Specimen: Blood from Arm, Right Updated: 11/02/24 1730     WBC 7.90 Thousand/uL      RBC 4.80 Million/uL      Hemoglobin 13.3 g/dL      Hematocrit 42.8 %      MCV 89 fL      MCH 27.7 pg      MCHC 31.1 g/dL      RDW 13.7 %      MPV 9.8 fL      Platelets 235 Thousands/uL      nRBC 0 /100 WBCs      Segmented % 51 %       Immature Grans % 0 %      Lymphocytes % 38 %      Monocytes % 6 %      Eosinophils Relative 4 %      Basophils Relative 1 %      Absolute Neutrophils 4.06 Thousands/µL      Absolute Immature Grans 0.01 Thousand/uL      Absolute Lymphocytes 3.01 Thousands/µL      Absolute Monocytes 0.47 Thousand/µL      Eosinophils Absolute 0.30 Thousand/µL      Basophils Absolute 0.05 Thousands/µL             No orders to display       Procedures    ED Medication and Procedure Management   Prior to Admission Medications   Prescriptions Last Dose Informant Patient Reported? Taking?   hydrocortisone 2.5 % lotion   No No   Sig: Apply topically 2 (two) times a day for 14 days   naproxen (Naprosyn) 500 mg tablet   No No   Sig: Take 1 tablet (500 mg total) by mouth 2 (two) times a day with meals for 21 days   ondansetron (ZOFRAN-ODT) 4 mg disintegrating tablet   No No   Sig: Take 1 tablet (4 mg total) by mouth every 6 (six) hours as needed for vomiting   Patient not taking: Reported on 8/31/2024      Facility-Administered Medications: None     Discharge Medication List as of 11/2/2024  6:46 PM        CONTINUE these medications which have CHANGED    Details   ondansetron (ZOFRAN-ODT) 4 mg disintegrating tablet Take 1 tablet (4 mg total) by mouth every 6 (six) hours as needed for nausea or vomiting, Starting Sat 11/2/2024, Normal           CONTINUE these medications which have NOT CHANGED    Details   hydrocortisone 2.5 % lotion Apply topically 2 (two) times a day for 14 days, Starting Thu 12/29/2022, Until Thu 1/12/2023, Normal      naproxen (Naprosyn) 500 mg tablet Take 1 tablet (500 mg total) by mouth 2 (two) times a day with meals for 21 days, Starting Sat 8/31/2024, Until Sat 9/21/2024, Normal           No discharge procedures on file.  ED SEPSIS DOCUMENTATION   Time reflects when diagnosis was documented in both MDM as applicable and the Disposition within this note       Time User Action Codes Description Comment    11/2/2024  6:45  PM Terry, Kayla Add [K52.9] Joseph     11/2/2024  6:45 PM Kayla Stewart [R11.0] Nena Stewart MD  11/02/24 2029

## 2024-11-02 NOTE — Clinical Note
Evonne Seth was seen and treated in our emergency department on 11/2/2024.                Diagnosis:     Evonne  is off the rest of the shift today.    She may return on this date: 11/04/2024         If you have any questions or concerns, please don't hesitate to call.      Kayla Stewart MD    ______________________________           _______________          _______________  Hospital Representative                              Date                                Time

## 2024-12-01 ENCOUNTER — HOSPITAL ENCOUNTER (EMERGENCY)
Facility: HOSPITAL | Age: 24
Discharge: HOME | End: 2024-12-01
Attending: EMERGENCY MEDICINE
Payer: COMMERCIAL

## 2024-12-01 ENCOUNTER — APPOINTMENT (EMERGENCY)
Dept: RADIOLOGY | Facility: HOSPITAL | Age: 24
End: 2024-12-01
Payer: COMMERCIAL

## 2024-12-01 VITALS
OXYGEN SATURATION: 98 % | RESPIRATION RATE: 17 BRPM | HEART RATE: 71 BPM | DIASTOLIC BLOOD PRESSURE: 58 MMHG | TEMPERATURE: 98.6 F | SYSTOLIC BLOOD PRESSURE: 126 MMHG

## 2024-12-01 DIAGNOSIS — J06.9 UPPER RESPIRATORY TRACT INFECTION, UNSPECIFIED TYPE: Primary | ICD-10-CM

## 2024-12-01 LAB — S PYO DNA THROAT QL NAA+PROBE: NOT DETECTED

## 2024-12-01 PROCEDURE — 71046 X-RAY EXAM CHEST 2 VIEWS: CPT

## 2024-12-01 PROCEDURE — 94640 AIRWAY INHALATION TREATMENT: CPT

## 2024-12-01 PROCEDURE — 87651 STREP A DNA AMP PROBE: CPT | Performed by: PHYSICIAN ASSISTANT

## 2024-12-01 PROCEDURE — 99284 EMERGENCY DEPT VISIT MOD MDM: CPT | Performed by: PHYSICIAN ASSISTANT

## 2024-12-01 PROCEDURE — 99284 EMERGENCY DEPT VISIT MOD MDM: CPT

## 2024-12-01 RX ORDER — PREDNISONE 20 MG/1
60 TABLET ORAL ONCE
Status: COMPLETED | OUTPATIENT
Start: 2024-12-01 | End: 2024-12-01

## 2024-12-01 RX ORDER — PREDNISONE 50 MG/1
50 TABLET ORAL DAILY
Qty: 2 TABLET | Refills: 0 | Status: SHIPPED | OUTPATIENT
Start: 2024-12-02

## 2024-12-01 RX ORDER — ALBUTEROL SULFATE 90 UG/1
2 INHALANT RESPIRATORY (INHALATION) ONCE
Status: COMPLETED | OUTPATIENT
Start: 2024-12-01 | End: 2024-12-01

## 2024-12-01 RX ORDER — IPRATROPIUM BROMIDE AND ALBUTEROL SULFATE 2.5; .5 MG/3ML; MG/3ML
3 SOLUTION RESPIRATORY (INHALATION)
Status: DISCONTINUED | OUTPATIENT
Start: 2024-12-01 | End: 2024-12-01 | Stop reason: HOSPADM

## 2024-12-01 RX ADMIN — IPRATROPIUM BROMIDE AND ALBUTEROL SULFATE 3 ML: .5; 3 SOLUTION RESPIRATORY (INHALATION) at 11:34

## 2024-12-01 RX ADMIN — ALBUTEROL SULFATE 2 PUFF: 108 AEROSOL, METERED RESPIRATORY (INHALATION) at 11:33

## 2024-12-01 RX ADMIN — PREDNISONE 60 MG: 20 TABLET ORAL at 11:32

## 2024-12-01 NOTE — Clinical Note
Evonne Seth was seen and treated in our emergency department on 12/1/2024.                Diagnosis:     Evonne  is off the rest of the shift today.    She may return on this date:     Please excuse this individual on December 1, 2024.     If you have any questions or concerns, please don't hesitate to call.      Fabian Aleman PA-C    ______________________________           _______________          _______________  Hospital Representative                              Date                                Time

## 2024-12-01 NOTE — DISCHARGE INSTRUCTIONS
Please return to the emergency department for worsening symptoms including chest pain, shortness of breath, dizziness, lightheadedness, fever greater than 103, severe pain, inability to walk, fainting episodes, etc..  Please follow-up with your family practice provider as soon as possible.  I have sent medications over to the pharmacy for your symptoms.  Please take as directed.  You may use 1 to 2 puffs of the inhaler every 6 hours as needed for wheezing or chest tightness.

## 2024-12-01 NOTE — ED PROVIDER NOTES
HPI: Patient is a 24 y.o. female who presents with 3 days of sore throat, nonproductive cough, and sinus pain.  She denies any chest pain or shortness of breath.  She denies any fevers or chills.  She notes nasal congestion and rhinorrhea.  Positive sick contacts at home.  No other complaints at this time.    No Known Allergies    Past Medical History:   Diagnosis Date    Asthma       History reviewed. No pertinent surgical history.  Social History     Tobacco Use    Smoking status: Former     Current packs/day: 0.25     Average packs/day: 0.3 packs/day for 1.9 years (0.5 ttl pk-yrs)     Types: Cigarettes     Start date: 12/29/2022    Smokeless tobacco: Never   Vaping Use    Vaping status: Never Used   Substance Use Topics    Alcohol use: Yes     Comment: socially    Drug use: Yes     Frequency: 1.0 times per week     Types: Marijuana     Comment: last used; 8/31/24       Nursing notes reviewed  Physical Exam:  ED Triage Vitals [12/01/24 1111]   Temperature Pulse Respirations Blood Pressure SpO2   98.6 °F (37 °C) 71 17 126/58 98 %      Temp Source Heart Rate Source Patient Position - Orthostatic VS BP Location FiO2 (%)   Oral Monitor Lying Left arm --      Pain Score       --           ROS: Positive for sore throat, nonproductive cough, sinus pain, the remainder of a 10 organ system ROS was otherwise unremarkable.  General: awake, alert, no acute distress    Head: normocephalic, atraumatic    Eyes: no scleral icterus  Throat: erythema without exudates; midline uvula; no trismus or drooling; tolerates secretions without difficulty  Ears: external ears normal, hearing grossly intact  Nose: external exam grossly normal, negative nasal discharge  Neck: symmetric, No JVD noted, trachea midline  Pulmonary: no respiratory distress, no tachypnea noted; clear to auscultation bilaterally without adventitious breath sounds  Cardiovascular: appears well perfused  Abdomen: no distention noted  Musculoskeletal: no deformities  noted, tone normal  Neuro: grossly non-focal  Psych: mood and affect appropriate    The patient is stable and has a history and physical exam consistent with a viral illness. COVID19 and influenza not ordered per patient preference.  Chest x-ray is without acute cardiopulmonary disease on my independent interpretation.  Patient was given a Ventolin inhaler and a DuoNeb while here in the emergency department in addition to a dose of steroids.  She is feeling better after this.  Stable for discharge at this time.  Patient instructed on how to use inhaler at home.  Short course of prednisone sent to the patient's pharmacy.  I considered the patient's other medical conditions as applicable/noted above in my medical decision making.  The patient is stable upon discharge. The plan is for supportive care at home.    The patient (and any family present) verbalized understanding of the discharge instructions and warnings that would necessitate return to the Emergency Department.  All questions were answered prior to discharge.    Medications   ipratropium-albuterol (DUO-NEB) 0.5-2.5 mg/3 mL inhalation solution 3 mL (3 mL Nebulization Given 12/1/24 1134)   albuterol (PROVENTIL HFA,VENTOLIN HFA) inhaler 2 puff (2 puffs Inhalation Given 12/1/24 1133)   predniSONE tablet 60 mg (60 mg Oral Given 12/1/24 1132)     Final diagnoses:   Upper respiratory tract infection, unspecified type     Time reflects when diagnosis was documented in both MDM as applicable and the Disposition within this note       Time User Action Codes Description Comment    12/1/2024 12:09 PM Fabian Aleman [J06.9] Upper respiratory tract infection, unspecified type           ED Disposition       ED Disposition   Discharge    Condition   Stable    Date/Time   Sun Dec 1, 2024 12:09 PM    Comment   Evonne Seth discharge to home/self care.                   Follow-up Information       Follow up With Specialties Details Why Contact Info Additional  Information    [facility] Emergency Department Emergency Medicine Go to  If symptoms worsen [address] [id number] [phone] [facility] Emergency Department, [address] [address]    [facility] Family Medicine Schedule an appointment as soon as possible for a visit   [address] [facility] [address] [phone] [facility], [address] [place] [address], [id number] [phone]          Discharge Medication List as of 12/1/2024 12:10 PM        START taking these medications    Details   predniSONE 50 mg tablet Take 1 tablet (50 mg total) by mouth daily Do not start before December 2, 2024., Starting Mon 12/2/2024, Normal           CONTINUE these medications which have NOT CHANGED    Details   hydrocortisone 2.5 % lotion Apply topically 2 (two) times a day for 14 days, Starting Thu 12/29/2022, Until Thu 1/12/2023, Normal      naproxen (Naprosyn) 500 mg tablet Take 1 tablet (500 mg total) by mouth 2 (two) times a day with meals for 21 days, Starting Sat 8/31/2024, Until Sat 9/21/2024, Normal      ondansetron (ZOFRAN-ODT) 4 mg disintegrating tablet Take 1 tablet (4 mg total) by mouth every 6 (six) hours as needed for nausea or vomiting, Starting Sat 11/2/2024, Normal           No discharge procedures on file.    Electronically Signed by     Fabian Aleman PA-C  12/01/24 0765

## 2024-12-18 ENCOUNTER — HOSPITAL ENCOUNTER (EMERGENCY)
Facility: HOSPITAL | Age: 24
Discharge: HOME | End: 2024-12-18
Attending: EMERGENCY MEDICINE
Payer: COMMERCIAL

## 2024-12-18 VITALS
RESPIRATION RATE: 14 BRPM | OXYGEN SATURATION: 100 % | TEMPERATURE: 97.8 F | DIASTOLIC BLOOD PRESSURE: 65 MMHG | WEIGHT: 128.31 LBS | HEART RATE: 53 BPM | HEIGHT: 65 IN | BODY MASS INDEX: 21.38 KG/M2 | SYSTOLIC BLOOD PRESSURE: 132 MMHG

## 2024-12-18 DIAGNOSIS — R11.2 NAUSEA VOMITING AND DIARRHEA: Primary | ICD-10-CM

## 2024-12-18 DIAGNOSIS — R19.7 NAUSEA VOMITING AND DIARRHEA: Primary | ICD-10-CM

## 2024-12-18 DIAGNOSIS — R11.2 CANNABINOID HYPEREMESIS SYNDROME: ICD-10-CM

## 2024-12-18 DIAGNOSIS — F12.90 CANNABINOID HYPEREMESIS SYNDROME: ICD-10-CM

## 2024-12-18 LAB
ALBUMIN SERPL BCG-MCNC: 4.8 G/DL (ref 3.5–5)
ALP SERPL-CCNC: 54 U/L (ref 34–104)
ALT SERPL W P-5'-P-CCNC: 14 U/L (ref 7–52)
ANION GAP SERPL CALCULATED.3IONS-SCNC: 14 MMOL/L (ref 4–13)
AST SERPL W P-5'-P-CCNC: 20 U/L (ref 13–39)
B-HCG SERPL-ACNC: <0.6 MIU/ML (ref 0–5)
BASOPHILS # BLD AUTO: 0.01 THOUSANDS/ΜL (ref 0–0.1)
BASOPHILS NFR BLD AUTO: 0 % (ref 0–1)
BILIRUB SERPL-MCNC: 0.88 MG/DL (ref 0.2–1)
BUN SERPL-MCNC: 14 MG/DL (ref 5–25)
CALCIUM SERPL-MCNC: 9.8 MG/DL (ref 8.4–10.2)
CHLORIDE SERPL-SCNC: 104 MMOL/L (ref 96–108)
CO2 SERPL-SCNC: 18 MMOL/L (ref 21–32)
CREAT SERPL-MCNC: 0.55 MG/DL (ref 0.6–1.3)
EOSINOPHIL # BLD AUTO: 0 THOUSAND/ΜL (ref 0–0.61)
EOSINOPHIL NFR BLD AUTO: 0 % (ref 0–6)
ERYTHROCYTE [DISTWIDTH] IN BLOOD BY AUTOMATED COUNT: 13 % (ref 11.6–15.1)
FLUAV AG UPPER RESP QL IA.RAPID: NEGATIVE
FLUBV AG UPPER RESP QL IA.RAPID: NEGATIVE
GFR SERPL CREATININE-BSD FRML MDRD: 131 ML/MIN/1.73SQ M
GLUCOSE SERPL-MCNC: 141 MG/DL (ref 65–140)
HCT VFR BLD AUTO: 45.7 % (ref 34.8–46.1)
HGB BLD-MCNC: 15 G/DL (ref 11.5–15.4)
IMM GRANULOCYTES # BLD AUTO: 0.04 THOUSAND/UL (ref 0–0.2)
IMM GRANULOCYTES NFR BLD AUTO: 0 % (ref 0–2)
LIPASE SERPL-CCNC: <6 U/L (ref 11–82)
LYMPHOCYTES # BLD AUTO: 0.41 THOUSANDS/ΜL (ref 0.6–4.47)
LYMPHOCYTES NFR BLD AUTO: 3 % (ref 14–44)
MCH RBC QN AUTO: 28.5 PG (ref 26.8–34.3)
MCHC RBC AUTO-ENTMCNC: 32.8 G/DL (ref 31.4–37.4)
MCV RBC AUTO: 87 FL (ref 82–98)
MONOCYTES # BLD AUTO: 0.39 THOUSAND/ΜL (ref 0.17–1.22)
MONOCYTES NFR BLD AUTO: 3 % (ref 4–12)
NEUTROPHILS # BLD AUTO: 11.68 THOUSANDS/ΜL (ref 1.85–7.62)
NEUTS SEG NFR BLD AUTO: 94 % (ref 43–75)
NRBC BLD AUTO-RTO: 0 /100 WBCS
PLATELET # BLD AUTO: 270 THOUSANDS/UL (ref 149–390)
PMV BLD AUTO: 10.3 FL (ref 8.9–12.7)
POTASSIUM SERPL-SCNC: 3.9 MMOL/L (ref 3.5–5.3)
PROT SERPL-MCNC: 7.9 G/DL (ref 6.4–8.4)
RBC # BLD AUTO: 5.27 MILLION/UL (ref 3.81–5.12)
SARS-COV+SARS-COV-2 AG RESP QL IA.RAPID: NEGATIVE
SODIUM SERPL-SCNC: 136 MMOL/L (ref 135–147)
WBC # BLD AUTO: 12.53 THOUSAND/UL (ref 4.31–10.16)

## 2024-12-18 PROCEDURE — 99284 EMERGENCY DEPT VISIT MOD MDM: CPT | Performed by: PHYSICIAN ASSISTANT

## 2024-12-18 PROCEDURE — 96375 TX/PRO/DX INJ NEW DRUG ADDON: CPT

## 2024-12-18 PROCEDURE — 96374 THER/PROPH/DIAG INJ IV PUSH: CPT

## 2024-12-18 PROCEDURE — 83690 ASSAY OF LIPASE: CPT | Performed by: PHYSICIAN ASSISTANT

## 2024-12-18 PROCEDURE — 87804 INFLUENZA ASSAY W/OPTIC: CPT | Performed by: PHYSICIAN ASSISTANT

## 2024-12-18 PROCEDURE — 96361 HYDRATE IV INFUSION ADD-ON: CPT

## 2024-12-18 PROCEDURE — 80053 COMPREHEN METABOLIC PANEL: CPT | Performed by: PHYSICIAN ASSISTANT

## 2024-12-18 PROCEDURE — 36415 COLL VENOUS BLD VENIPUNCTURE: CPT | Performed by: PHYSICIAN ASSISTANT

## 2024-12-18 PROCEDURE — 85025 COMPLETE CBC W/AUTO DIFF WBC: CPT | Performed by: PHYSICIAN ASSISTANT

## 2024-12-18 PROCEDURE — 99283 EMERGENCY DEPT VISIT LOW MDM: CPT

## 2024-12-18 PROCEDURE — 87811 SARS-COV-2 COVID19 W/OPTIC: CPT | Performed by: PHYSICIAN ASSISTANT

## 2024-12-18 PROCEDURE — 84702 CHORIONIC GONADOTROPIN TEST: CPT | Performed by: PHYSICIAN ASSISTANT

## 2024-12-18 RX ORDER — ONDANSETRON 2 MG/ML
4 INJECTION INTRAMUSCULAR; INTRAVENOUS ONCE
Status: COMPLETED | OUTPATIENT
Start: 2024-12-18 | End: 2024-12-18

## 2024-12-18 RX ORDER — DROPERIDOL 2.5 MG/ML
0.62 INJECTION, SOLUTION INTRAMUSCULAR; INTRAVENOUS ONCE
Status: COMPLETED | OUTPATIENT
Start: 2024-12-18 | End: 2024-12-18

## 2024-12-18 RX ADMIN — ONDANSETRON 4 MG: 2 INJECTION INTRAMUSCULAR; INTRAVENOUS at 12:40

## 2024-12-18 RX ADMIN — SODIUM CHLORIDE 1000 ML: 0.9 INJECTION, SOLUTION INTRAVENOUS at 12:39

## 2024-12-18 RX ADMIN — DROPERIDOL 0.62 MG: 2.5 INJECTION, SOLUTION INTRAMUSCULAR; INTRAVENOUS at 15:15

## 2024-12-18 NOTE — ED NOTES
Requesting po fluids.  Provider aware and ice chips given at her direction     Magda Gupta RN  12/18/24 1298

## 2024-12-18 NOTE — ED PROVIDER NOTES
Time reflects when diagnosis was documented in both MDM as applicable and the Disposition within this note       Time User Action Codes Description Comment    12/18/2024  3:50 PM Medina Colon Add [R11.2,  R19.7] Nausea vomiting and diarrhea     12/18/2024  3:50 PM Medina Colon Add [O21.0] Hyperemesis gravidarum     12/18/2024  3:51 PM Medina Colon Remove [O21.0] Hyperemesis gravidarum     12/18/2024  3:51 PM Medina Colon Add [R11.2,  F12.90] Cannabinoid hyperemesis syndrome           ED Disposition       ED Disposition   Discharge    Condition   Stable    Date/Time   Wed Dec 18, 2024  3:50 PM    Comment   Evonne Seth discharge to home/self care.                   Assessment & Plan       Medical Decision Making  Patient with NVD positive ill contacts son with similar symptoms suspect gastroenteritis, gastritis, dehydration, electrolyte abnormalities   Will check labs, give IV fluids, Zofran, labs rev'd, pt was doing better, still complaining of nausea, wanted to try drink water, given ice chips then vomited again.  Now complaining of crampy abdominal pain, patient does use marijuana daily suspect may be a component of hyperemesis cannabis, will try droperidol, pt requesting to still drink water.  Patient feeling better more comfortable requesting to be discharged  Stable for discharge outpatient follow-up.  Patient states Zofran did not really help much so will not give any prescription for discharge.  To avoid THC    Amount and/or Complexity of Data Reviewed  External Data Reviewed: notes.  Labs: ordered. Decision-making details documented in ED Course.    Risk  Prescription drug management.        ED Course as of 12/18/24 1600   Wed Dec 18, 2024   1307 WBC(!): 12.53  Mild leukocytosis   1348 Neg viral panel   1349 LIPASE(!): <6  normal   1349 HCG QUANTITATIVE: <0.6  Not pregnant   1349 Cmp rev'd gap 14, co2 18, glu 141, pt getting IVF       Medications   sodium chloride 0.9 % bolus 1,000 mL (0 mL  Intravenous Stopped 12/18/24 1554)   ondansetron (ZOFRAN) injection 4 mg (4 mg Intravenous Given 12/18/24 1240)   droperidol (INAPSINE) injection 0.625 mg (0.625 mg Intravenous Given 12/18/24 1515)       ED Risk Strat Scores                                              History of Present Illness       Chief Complaint   Patient presents with    Vomiting     Vomiting and diarrhea since last night, (+) sick contacts       Past Medical History:   Diagnosis Date    Asthma       History reviewed. No pertinent surgical history.   History reviewed. No pertinent family history.   Social History     Tobacco Use    Smoking status: Former     Current packs/day: 0.25     Average packs/day: 0.3 packs/day for 2.0 years (0.5 ttl pk-yrs)     Types: Cigarettes     Start date: 12/29/2022    Smokeless tobacco: Never   Vaping Use    Vaping status: Never Used   Substance Use Topics    Alcohol use: Yes     Comment: socially    Drug use: Yes     Frequency: 1.0 times per week     Types: Marijuana     Comment: last used; 8/31/24      E-Cigarette/Vaping    E-Cigarette Use Never User       E-Cigarette/Vaping Substances    Nicotine Yes       I have reviewed and agree with the history as documented.     PMH: Asthma   No PSH   Positive Daily THC use  Patient presents to ED complaining of nausea followed by multiple episodes of vomiting, nonbloody diarrhea, then intermittent diffuse abdominal pain  No fever, CP, SOB, urinary complaints, abnormal vaginal bleeding or discharge  + ill contacts, son with similar sx        Review of Systems   Constitutional:  Negative for fever.   Respiratory:  Negative for shortness of breath.    Cardiovascular:  Negative for chest pain.   Gastrointestinal:  Positive for abdominal pain, diarrhea, nausea and vomiting. Negative for constipation.   Genitourinary:  Negative for dysuria, vaginal bleeding and vaginal discharge.   Skin:  Negative for rash.   All other systems reviewed and are negative.          Objective        ED Triage Vitals [12/18/24 1219]   Temperature Pulse Blood Pressure Respirations SpO2 Patient Position - Orthostatic VS   97.8 °F (36.6 °C) (!) 53 132/65 14 100 % Lying      Temp Source Heart Rate Source BP Location FiO2 (%) Pain Score    Oral Monitor Right arm -- No Pain      Vitals      Date and Time Temp Pulse SpO2 Resp BP Pain Score FACES Pain Rating User   12/18/24 1219 97.8 °F (36.6 °C) 53 100 % 14 132/65 No Pain -- EJN            Physical Exam  Vitals and nursing note reviewed.   Constitutional:       General: She is in acute distress (mild).      Appearance: She is well-developed.      Comments: Initially lying still on stretcher   HENT:      Head: Normocephalic and atraumatic.      Right Ear: External ear normal.      Left Ear: External ear normal.      Nose: Nose normal.      Mouth/Throat:      Mouth: Mucous membranes are moist.      Pharynx: Oropharynx is clear.   Eyes:      Conjunctiva/sclera: Conjunctivae normal.   Cardiovascular:      Rate and Rhythm: Normal rate and regular rhythm.   Pulmonary:      Effort: Pulmonary effort is normal. No respiratory distress.      Breath sounds: Normal breath sounds.   Abdominal:      General: Bowel sounds are normal.      Palpations: Abdomen is soft.      Tenderness: There is no abdominal tenderness. There is no guarding or rebound.   Genitourinary:     Comments: Deferred  Musculoskeletal:         General: Normal range of motion.      Cervical back: Normal range of motion.   Skin:     General: Skin is warm and dry.   Neurological:      Mental Status: She is alert and oriented to person, place, and time.   Psychiatric:         Behavior: Behavior normal.         Results Reviewed       Procedure Component Value Units Date/Time    CBC and differential [[id number]]  (Abnormal) Collected: 12/18/24 1243    Lab Status: Final result Specimen: Blood from Arm, Right Updated: 12/18/24 1359     WBC 12.53 Thousand/uL      RBC 5.27 Million/uL      Hemoglobin 15.0 g/dL       Hematocrit 45.7 %      MCV 87 fL      MCH 28.5 pg      MCHC 32.8 g/dL      RDW 13.0 %      MPV 10.3 fL      Platelets 270 Thousands/uL      nRBC 0 /100 WBCs      Segmented % 94 %      Immature Grans % 0 %      Lymphocytes % 3 %      Monocytes % 3 %      Eosinophils Relative 0 %      Basophils Relative 0 %      Absolute Neutrophils 11.68 Thousands/µL      Absolute Immature Grans 0.04 Thousand/uL      Absolute Lymphocytes 0.41 Thousands/µL      Absolute Monocytes 0.39 Thousand/µL      Eosinophils Absolute 0.00 Thousand/µL      Basophils Absolute 0.01 Thousands/µL     Narrative:      This is an appended report.  These results have been appended to a previously verified report.    FLU/COVID Rapid Antigen (30 min. TAT) - Preferred screening test in ED [[id number]]  (Normal) Collected: 12/18/24 1243    Lab Status: Final result Specimen: Nares from Nose Updated: 12/18/24 1321     SARS COV Rapid Antigen Negative     Influenza A Rapid Antigen Negative     Influenza B Rapid Antigen Negative    Narrative:      This test has been performed using the Tricidaidel Marta 2 FLU+SARS Antigen test under the Emergency Use Authorization (EUA). This test has been validated by the  and verified by the performing laboratory. The Marta uses lateral flow immunofluorescent sandwich assay to detect SARS-COV, Influenza A and Influenza B Antigen.     The Quidel Marta 2 SARS Antigen test does not differentiate between SARS-CoV and SARS-CoV-2.     Negative results are presumptive and may be confirmed with a molecular assay, if necessary, for patient management. Negative results do not rule out SARS-CoV-2 or influenza infection and should not be used as the sole basis for treatment or patient management decisions. A negative test result may occur if the level of antigen in a sample is below the limit of detection of this test.     Positive results are indicative of the presence of viral antigens, but do not rule out bacterial infection or  co-infection with other viruses.     All test results should be used as an adjunct to clinical observations and other information available to the provider.    FOR PEDIATRIC PATIENTS - copy/paste COVID Guidelines URL to browser: [email or web address]    Quantitative hCG [[id number]]  (Normal) Collected: 12/18/24 1243    Lab Status: Final result Specimen: Blood from Arm, Right Updated: 12/18/24 1316     HCG, Quant <0.6 mIU/mL     Narrative:       Expected Ranges:    HCG results between 5.0 and 25.0 mIU/mL may be indicative of early pregnancy but should be interpreted in light of the total clinical presentation.    HCG can rise to detectable levels in kari and post menopausal women (0-11.6 mIU/mL).     Approximate               Approximate HCG  Gestation age          Concentration ( mIU/mL)  _____________          ______________________   Weeks                      HCG values  0.2-1                       5-50  1-2                           2-3                         100-5000  3-4                         500-81197  4-5                         1000-74359  5-6                         [id number]  6-8                         [id number]  8-12                        [id number]      Comprehensive metabolic panel [[id number]]  (Abnormal) Collected: 12/18/24 1243    Lab Status: Final result Specimen: Blood from Arm, Right Updated: 12/18/24 1314     Sodium 136 mmol/L      Potassium 3.9 mmol/L      Chloride 104 mmol/L      CO2 18 mmol/L      ANION GAP 14 mmol/L      BUN 14 mg/dL      Creatinine 0.55 mg/dL      Glucose 141 mg/dL      Calcium 9.8 mg/dL      AST 20 U/L      ALT 14 U/L      Alkaline Phosphatase 54 U/L      Total Protein 7.9 g/dL      Albumin 4.8 g/dL      Total Bilirubin 0.88 mg/dL      eGFR 131 ml/min/1.73sq m     Narrative:      National Kidney Disease Foundation guidelines for Chronic Kidney Disease (CKD):     Stage 1 with normal or high GFR (GFR > 90  mL/min/1.73 square meters)    Stage 2 Mild CKD (GFR = 60-89 mL/min/1.73 square meters)    Stage 3A Moderate CKD (GFR = 45-59 mL/min/1.73 square meters)    Stage 3B Moderate CKD (GFR = 30-44 mL/min/1.73 square meters)    Stage 4 Severe CKD (GFR = 15-29 mL/min/1.73 square meters)    Stage 5 End Stage CKD (GFR <15 mL/min/1.73 square meters)  Note: GFR calculation is accurate only with a steady state creatinine    Lipase [[id number]]  (Abnormal) Collected: 12/18/24 1243    Lab Status: Final result Specimen: Blood from Arm, Right Updated: 12/18/24 1314     Lipase <6 u/L             No orders to display       Procedures    ED Medication and Procedure Management   Prior to Admission Medications   Prescriptions Last Dose Informant Patient Reported? Taking?   hydrocortisone 2.5 % lotion   No No   Sig: Apply topically 2 (two) times a day for 14 days   naproxen (Naprosyn) 500 mg tablet   No No   Sig: Take 1 tablet (500 mg total) by mouth 2 (two) times a day with meals for 21 days   ondansetron (ZOFRAN-ODT) 4 mg disintegrating tablet   No No   Sig: Take 1 tablet (4 mg total) by mouth every 6 (six) hours as needed for nausea or vomiting   predniSONE 50 mg tablet   No No   Sig: Take 1 tablet (50 mg total) by mouth daily Do not start before December 2, 2024.      Facility-Administered Medications: None     Discharge Medication List as of 12/18/2024  3:52 PM        CONTINUE these medications which have NOT CHANGED    Details   hydrocortisone 2.5 % lotion Apply topically 2 (two) times a day for 14 days, Starting Thu 12/29/2022, Until Thu 1/12/2023, Normal      naproxen (Naprosyn) 500 mg tablet Take 1 tablet (500 mg total) by mouth 2 (two) times a day with meals for 21 days, Starting Sat 8/31/2024, Until Sat 9/21/2024, Normal      ondansetron (ZOFRAN-ODT) 4 mg disintegrating tablet Take 1 tablet (4 mg total) by mouth every 6 (six) hours as needed for nausea or vomiting, Starting Sat 11/2/2024, Normal      predniSONE 50 mg tablet Take  1 tablet (50 mg total) by mouth daily Do not start before December 2, 2024., Starting Mon 12/2/2024, Normal           No discharge procedures on file.  ED SEPSIS DOCUMENTATION   Time reflects when diagnosis was documented in both MDM as applicable and the Disposition within this note       Time User Action Codes Description Comment    12/18/2024  3:50 PM Mdeina Colon [R11.2,  R19.7] Nausea vomiting and diarrhea     12/18/2024  3:50 PM Medina Colon Add [O21.0] Hyperemesis gravidarum     12/18/2024  3:51 PM Medina Colon Remove [O21.0] Hyperemesis gravidarum     12/18/2024  3:51 PM Medina Colon [R11.2,  F12.90] Cannabinoid hyperemesis syndrome                  Medina Colon PA-C  12/18/24 1600

## 2025-02-18 ENCOUNTER — HOSPITAL ENCOUNTER (EMERGENCY)
Facility: HOSPITAL | Age: 25
Discharge: HOME | End: 2025-02-18
Attending: EMERGENCY MEDICINE
Payer: COMMERCIAL

## 2025-02-18 VITALS
TEMPERATURE: 98 F | RESPIRATION RATE: 14 BRPM | DIASTOLIC BLOOD PRESSURE: 61 MMHG | SYSTOLIC BLOOD PRESSURE: 111 MMHG | HEART RATE: 87 BPM | OXYGEN SATURATION: 97 %

## 2025-02-18 DIAGNOSIS — J45.21 MILD INTERMITTENT ASTHMA WITH EXACERBATION: Primary | ICD-10-CM

## 2025-02-18 PROCEDURE — 99284 EMERGENCY DEPT VISIT MOD MDM: CPT | Performed by: EMERGENCY MEDICINE

## 2025-02-18 PROCEDURE — 99283 EMERGENCY DEPT VISIT LOW MDM: CPT

## 2025-02-18 RX ORDER — PREDNISONE 20 MG/1
40 TABLET ORAL ONCE
Status: COMPLETED | OUTPATIENT
Start: 2025-02-18 | End: 2025-02-18

## 2025-02-18 RX ORDER — ALBUTEROL SULFATE 90 UG/1
2 INHALANT RESPIRATORY (INHALATION) ONCE
Status: COMPLETED | OUTPATIENT
Start: 2025-02-18 | End: 2025-02-18

## 2025-02-18 RX ORDER — ALBUTEROL SULFATE 0.83 MG/ML
2.5 SOLUTION RESPIRATORY (INHALATION) EVERY 6 HOURS PRN
Qty: 75 ML | Refills: 0 | Status: SHIPPED | OUTPATIENT
Start: 2025-02-18 | End: 2025-03-20

## 2025-02-18 RX ORDER — PREDNISONE 20 MG/1
40 TABLET ORAL DAILY
Qty: 10 TABLET | Refills: 0 | Status: SHIPPED | OUTPATIENT
Start: 2025-02-18 | End: 2025-02-23

## 2025-02-18 RX ADMIN — ALBUTEROL SULFATE 2 PUFF: 108 AEROSOL, METERED RESPIRATORY (INHALATION) at 14:17

## 2025-02-18 RX ADMIN — PREDNISONE 40 MG: 20 TABLET ORAL at 14:17

## 2025-02-18 NOTE — DISCHARGE INSTRUCTIONS
Follow-up with your primary care physician.  Use the prescribed medications as directed.  Please return the emergency department if you develop worsening symptoms, difficulty breathing, or anything else concerning to you.

## 2025-02-18 NOTE — ED NOTES
Reviewed discharge instructions at bedside, no further questions.        Rachelle Mata RN  02/18/25 1805

## 2025-02-18 NOTE — ED PROVIDER NOTES
Time reflects when diagnosis was documented in both MDM as applicable and the Disposition within this note       Time User Action Codes Description Comment    2/18/2025  2:10 PM Mandy Esposito Add [J45.21] Mild intermittent asthma with exacerbation           ED Disposition       ED Disposition   Discharge    Condition   Stable    Date/Time   Tue Feb 18, 2025  2:10 PM    Comment   Evonne Seth discharge to home/self care.                   Assessment & Plan       Medical Decision Making  24-year-old female presenting for evaluation of chest tightness.  History and exam are consistent with a mild asthma exacerbation.  She has no definitive wheezing on exam, reporting that she feels improved after using her mom's inhaler prior to arrival.  I also considered viral URI though patient does not have cough or fevers.  She is overall asymptomatic at the time of examination.  Provided with albuterol inhaler.  Refill sent to patient's pharmacy for albuterol nebulizer solution.  Started on course of steroids.  She is otherwise stable discharge at this time.  Advised follow-up with PCP.  Return precautions discussed.    Problems Addressed:  Mild intermittent asthma with exacerbation: acute illness or injury    Risk  Prescription drug management.             Medications   albuterol (PROVENTIL HFA,VENTOLIN HFA) inhaler 2 puff (2 puffs Inhalation Given 2/18/25 1417)   predniSONE tablet 40 mg (40 mg Oral Given 2/18/25 1417)       ED Risk Strat Scores                            SBIRT 22yo+      Flowsheet Row Most Recent Value   Initial Alcohol Screen: US AUDIT-C     1. How often do you have a drink containing alcohol? 0 Filed at: 02/18/2025 1402   2. How many drinks containing alcohol do you have on a typical day you are drinking?  0 Filed at: 02/18/2025 1402   3b. FEMALE Any Age, or MALE 65+: How often do you have 4 or more drinks on one occassion? 0 Filed at: 02/18/2025 1402   Audit-C Score 0 Filed at: 02/18/2025 1402   CAROLANN:  "How many times in the past year have you...    Used an illegal drug or used a prescription medication for non-medical reasons? Never Filed at: 02/18/2025 1402                            History of Present Illness       Chief Complaint   Patient presents with    Asthma     Patient reports \"my asthma has been acting up for a few days and it always happens when it is cold because I work outside.\" Patient reports running out of her albuterol        Past Medical History:   Diagnosis Date    Asthma       History reviewed. No pertinent surgical history.   History reviewed. No pertinent family history.   Social History     Tobacco Use    Smoking status: Former     Current packs/day: 0.25     Average packs/day: 0.3 packs/day for 2.1 years (0.5 ttl pk-yrs)     Types: Cigarettes     Start date: 12/29/2022    Smokeless tobacco: Never   Vaping Use    Vaping status: Never Used   Substance Use Topics    Alcohol use: Yes     Comment: socially    Drug use: Yes     Frequency: 1.0 times per week     Types: Marijuana     Comment: last used; 8/31/24      E-Cigarette/Vaping    E-Cigarette Use Never User       E-Cigarette/Vaping Substances    Nicotine Yes       I have reviewed and agree with the history as documented.     24-year-old female with asthma who presents for evaluation of chest tightness.  Patient reports onset of symptoms 2 days ago.  She was moving around some furniture in her house and started up just which she feels exacerbated her symptoms.  She has had some chest tightness and mild shortness of breath.  She reports being out of her albuterol inhaler and albuterol nebulizer solution prompting her to come in for evaluation.  She has not had any fevers, chest pain, vomiting, abdominal pain.  She used one of her mom's inhalers this morning with some relief.        Review of Systems   Constitutional:  Negative for chills and fever.   Respiratory:  Positive for chest tightness. Negative for cough and shortness of breath.  "   Cardiovascular:  Negative for chest pain.   Gastrointestinal:  Negative for abdominal pain, diarrhea, nausea and vomiting.   Genitourinary:  Negative for flank pain.   Musculoskeletal:  Negative for gait problem.   Skin:  Negative for rash.   Neurological:  Negative for weakness and light-headedness.   All other systems reviewed and are negative.          Objective       ED Triage Vitals [02/18/25 1355]   Temperature Pulse Blood Pressure Respirations SpO2 Patient Position - Orthostatic VS   98 °F (36.7 °C) 87 111/61 14 97 % --      Temp Source Heart Rate Source BP Location FiO2 (%) Pain Score    Oral Monitor -- -- --      Vitals      Date and Time Temp Pulse SpO2 Resp BP Pain Score FACES Pain Rating User   02/18/25 1355 98 °F (36.7 °C) 87 97 % 14 111/61 -- --             Physical Exam  Vitals and nursing note reviewed.   Constitutional:       General: She is not in acute distress.     Appearance: She is well-developed. She is not ill-appearing.   HENT:      Head: Normocephalic and atraumatic.      Nose: Nose normal.      Mouth/Throat:      Mouth: Mucous membranes are moist.   Eyes:      Conjunctiva/sclera: Conjunctivae normal.   Cardiovascular:      Rate and Rhythm: Normal rate and regular rhythm.      Heart sounds: No murmur heard.     No friction rub. No gallop.   Pulmonary:      Effort: Pulmonary effort is normal.      Breath sounds: Normal breath sounds. No wheezing, rhonchi or rales.   Abdominal:      General: There is no distension.      Palpations: Abdomen is soft.      Tenderness: There is no abdominal tenderness.   Musculoskeletal:         General: No swelling or tenderness. Normal range of motion.      Cervical back: Normal range of motion and neck supple.   Skin:     General: Skin is warm and dry.      Coloration: Skin is not pale.      Findings: No rash.   Neurological:      General: No focal deficit present.      Mental Status: She is alert and oriented to person, place, and time.   Psychiatric:          Behavior: Behavior normal.         Results Reviewed       None            No orders to display       Procedures    ED Medication and Procedure Management   Prior to Admission Medications   Prescriptions Last Dose Informant Patient Reported? Taking?   hydrocortisone 2.5 % lotion   No No   Sig: Apply topically 2 (two) times a day for 14 days   naproxen (Naprosyn) 500 mg tablet   No No   Sig: Take 1 tablet (500 mg total) by mouth 2 (two) times a day with meals for 21 days   ondansetron (ZOFRAN-ODT) 4 mg disintegrating tablet   No No   Sig: Take 1 tablet (4 mg total) by mouth every 6 (six) hours as needed for nausea or vomiting   predniSONE 50 mg tablet   No No   Sig: Take 1 tablet (50 mg total) by mouth daily Do not start before December 2, 2024.      Facility-Administered Medications: None     Discharge Medication List as of 2/18/2025  2:11 PM        START taking these medications    Details   albuterol (2.5 mg/3 mL) 0.083 % nebulizer solution Take 3 mL (2.5 mg total) by nebulization every 6 (six) hours as needed for wheezing or shortness of breath, Starting Tue 2/18/2025, Until Thu 3/20/2025 at 2359, Normal           CONTINUE these medications which have CHANGED    Details   predniSONE 20 mg tablet Take 2 tablets (40 mg total) by mouth daily for 5 days, Starting Tue 2/18/2025, Until Sun 2/23/2025, Normal           CONTINUE these medications which have NOT CHANGED    Details   hydrocortisone 2.5 % lotion Apply topically 2 (two) times a day for 14 days, Starting Thu 12/29/2022, Until Thu 1/12/2023, Normal      naproxen (Naprosyn) 500 mg tablet Take 1 tablet (500 mg total) by mouth 2 (two) times a day with meals for 21 days, Starting Sat 8/31/2024, Until Sat 9/21/2024, Normal      ondansetron (ZOFRAN-ODT) 4 mg disintegrating tablet Take 1 tablet (4 mg total) by mouth every 6 (six) hours as needed for nausea or vomiting, Starting Sat 11/2/2024, Normal           No discharge procedures on file.  ED SEPSIS  DOCUMENTATION   Time reflects when diagnosis was documented in both MDM as applicable and the Disposition within this note       Time User Action Codes Description Comment    2/18/2025  2:10 PM Mandy Esposito Add [J45.21] Mild intermittent asthma with exacerbation                  Mandy Esposito MD  02/18/25 152

## 2025-02-18 NOTE — Clinical Note
Evonne Seth was seen and treated in our emergency department on 2/18/2025.                Diagnosis:     Evonne  may return to work on return date.    She may return on this date: 02/19/2025         If you have any questions or concerns, please don't hesitate to call.      Mandy Esposito MD    ______________________________           _______________          _______________  Hospital Representative                              Date                                Time

## 2025-02-21 ENCOUNTER — HOSPITAL ENCOUNTER (EMERGENCY)
Facility: HOSPITAL | Age: 25
Discharge: HOME | End: 2025-02-21
Attending: EMERGENCY MEDICINE
Payer: COMMERCIAL

## 2025-02-21 VITALS
TEMPERATURE: 97.7 F | HEART RATE: 74 BPM | RESPIRATION RATE: 18 BRPM | OXYGEN SATURATION: 98 % | DIASTOLIC BLOOD PRESSURE: 53 MMHG | SYSTOLIC BLOOD PRESSURE: 107 MMHG

## 2025-02-21 DIAGNOSIS — J02.9 VIRAL PHARYNGITIS: Primary | ICD-10-CM

## 2025-02-21 DIAGNOSIS — J06.9 URI (UPPER RESPIRATORY INFECTION): ICD-10-CM

## 2025-02-21 LAB
FLUAV AG UPPER RESP QL IA.RAPID: NEGATIVE
FLUBV AG UPPER RESP QL IA.RAPID: NEGATIVE
S PYO DNA THROAT QL NAA+PROBE: NOT DETECTED
SARS-COV+SARS-COV-2 AG RESP QL IA.RAPID: NEGATIVE

## 2025-02-21 PROCEDURE — 87804 INFLUENZA ASSAY W/OPTIC: CPT | Performed by: EMERGENCY MEDICINE

## 2025-02-21 PROCEDURE — 99284 EMERGENCY DEPT VISIT MOD MDM: CPT | Performed by: EMERGENCY MEDICINE

## 2025-02-21 PROCEDURE — 87651 STREP A DNA AMP PROBE: CPT | Performed by: EMERGENCY MEDICINE

## 2025-02-21 PROCEDURE — 99282 EMERGENCY DEPT VISIT SF MDM: CPT

## 2025-02-21 PROCEDURE — 87811 SARS-COV-2 COVID19 W/OPTIC: CPT | Performed by: EMERGENCY MEDICINE

## 2025-02-21 RX ORDER — IBUPROFEN 600 MG/1
600 TABLET, FILM COATED ORAL ONCE
Status: COMPLETED | OUTPATIENT
Start: 2025-02-21 | End: 2025-02-21

## 2025-02-21 RX ORDER — ACETAMINOPHEN 325 MG/1
975 TABLET ORAL ONCE
Status: COMPLETED | OUTPATIENT
Start: 2025-02-21 | End: 2025-02-21

## 2025-02-21 RX ADMIN — IBUPROFEN 600 MG: 600 TABLET, FILM COATED ORAL at 11:32

## 2025-02-21 RX ADMIN — ACETAMINOPHEN 975 MG: 325 TABLET, FILM COATED ORAL at 11:32

## 2025-02-21 NOTE — ED PROVIDER NOTES
Time reflects when diagnosis was documented in both MDM as applicable and the Disposition within this note       Time User Action Codes Description Comment    2/21/2025 11:27 AM Talha Barbour Add [J02.9] Viral pharyngitis     2/21/2025 11:27 AM Talha Barbour Add [J06.9] URI (upper respiratory infection)           ED Disposition       ED Disposition   Discharge    Condition   Stable    Date/Time   Fri Feb 21, 2025 11:27 AM    Comment   Evonne Seth discharge to home/self care.                   Assessment & Plan       Medical Decision Making  Patient with flulike symptoms.    Differential including but not limited to flulike symptoms, flu, COVID, strep throat unlikely but possible as she has had a strep contact.  No signs of abscess.  No red flags.    COVID, flu, RSV and strep test are negative.  Will discharge home.  Return precautions given.    Problems Addressed:  URI (upper respiratory infection): acute illness or injury  Viral pharyngitis: acute illness or injury    Amount and/or Complexity of Data Reviewed  Labs: ordered.    Risk  OTC drugs.  Prescription drug management.             Medications   acetaminophen (TYLENOL) tablet 975 mg (has no administration in time range)   ibuprofen (MOTRIN) tablet 600 mg (has no administration in time range)       ED Risk Strat Scores                                                History of Present Illness       Chief Complaint   Patient presents with    Sore Throat     Sore throat x2 days, + strep contact       Past Medical History:   Diagnosis Date    Asthma       History reviewed. No pertinent surgical history.   History reviewed. No pertinent family history.   Social History     Tobacco Use    Smoking status: Former     Current packs/day: 0.25     Average packs/day: 0.3 packs/day for 2.1 years (0.5 ttl pk-yrs)     Types: Cigarettes     Start date: 12/29/2022    Smokeless tobacco: Never   Vaping Use    Vaping status: Never Used   Substance Use Topics    Alcohol  use: Yes     Comment: socially    Drug use: Yes     Frequency: 1.0 times per week     Types: Marijuana     Comment: last used; 8/31/24      E-Cigarette/Vaping    E-Cigarette Use Never User       E-Cigarette/Vaping Substances    Nicotine Yes       I have reviewed and agree with the history as documented.     24-year-old female presents for flulike symptoms.  Notes rhinorrhea, sore throat, occasional ear pain, headache, fatigue, sweats, chills.  Couple days of symptoms.  She has had contact with her son who has strep throat.      Sore Throat  Location:  Generalized  Quality:  Aching  Severity:  Mild  Onset quality:  Sudden  Timing:  Constant  Progression:  Unchanged  Chronicity:  New  Associated symptoms: chills and rhinorrhea        Review of Systems   Constitutional:  Positive for chills, diaphoresis and fatigue.   HENT:  Positive for rhinorrhea, sneezing and sore throat.    All other systems reviewed and are negative.          Objective       ED Triage Vitals [02/21/25 1010]   Temperature Pulse Blood Pressure Respirations SpO2 Patient Position - Orthostatic VS   97.7 °F (36.5 °C) 74 107/53 18 98 % Sitting      Temp Source Heart Rate Source BP Location FiO2 (%) Pain Score    Oral Monitor Left arm -- --      Vitals      Date and Time Temp Pulse SpO2 Resp BP Pain Score FACES Pain Rating User   02/21/25 1010 97.7 °F (36.5 °C) 74 98 % 18 107/53 -- -- KK            Physical Exam  Vitals and nursing note reviewed.   Constitutional:       General: She is not in acute distress.     Appearance: Normal appearance. She is not ill-appearing.   HENT:      Head: Normocephalic and atraumatic.      Right Ear: External ear normal.      Left Ear: External ear normal.      Nose: Nose normal.      Mouth/Throat:      Mouth: Mucous membranes are moist. No oral lesions.      Pharynx: Posterior oropharyngeal erythema present. No pharyngeal swelling, oropharyngeal exudate or uvula swelling.      Comments: No trismus.  Normal voice.  Eyes:       General:         Right eye: No discharge.         Left eye: No discharge.      Conjunctiva/sclera: Conjunctivae normal.   Cardiovascular:      Rate and Rhythm: Normal rate and regular rhythm.      Pulses: Normal pulses.      Heart sounds: No murmur heard.  Pulmonary:      Effort: Pulmonary effort is normal.      Breath sounds: Normal breath sounds.   Abdominal:      General: Abdomen is flat. There is no distension.      Tenderness: There is no abdominal tenderness.   Musculoskeletal:         General: Normal range of motion.      Cervical back: Normal range of motion.   Skin:     General: Skin is warm.      Capillary Refill: Capillary refill takes less than 2 seconds.      Findings: No rash.   Neurological:      General: No focal deficit present.      Mental Status: She is alert. Mental status is at baseline.   Psychiatric:         Mood and Affect: Mood normal.         Behavior: Behavior normal.         Results Reviewed       Procedure Component Value Units Date/Time    Strep A PCR [[id number]]  (Normal) Collected: 02/21/25 1039    Lab Status: Final result Specimen: Throat Updated: 02/21/25 1126     STREP A PCR Not Detected    FLU/COVID Rapid Antigen (30 min. TAT) - Preferred screening test in ED [[id number]]  (Normal) Collected: 02/21/25 1039    Lab Status: Final result Specimen: Nares from Nose Updated: 02/21/25 1113     SARS COV Rapid Antigen Negative     Influenza A Rapid Antigen Negative     Influenza B Rapid Antigen Negative    Narrative:      This test has been performed using the Quidel Marta 2 FLU+SARS Antigen test under the Emergency Use Authorization (EUA). This test has been validated by the  and verified by the performing laboratory. The Marta uses lateral flow immunofluorescent sandwich assay to detect SARS-COV, Influenza A and Influenza B Antigen.     The Quidel Marta 2 SARS Antigen test does not differentiate between SARS-CoV and SARS-CoV-2.     Negative results are presumptive and may  be confirmed with a molecular assay, if necessary, for patient management. Negative results do not rule out SARS-CoV-2 or influenza infection and should not be used as the sole basis for treatment or patient management decisions. A negative test result may occur if the level of antigen in a sample is below the limit of detection of this test.     Positive results are indicative of the presence of viral antigens, but do not rule out bacterial infection or co-infection with other viruses.     All test results should be used as an adjunct to clinical observations and other information available to the provider.    FOR PEDIATRIC PATIENTS - copy/paste COVID Guidelines URL to browser: [email or web address]            No orders to display       Procedures    ED Medication and Procedure Management   Prior to Admission Medications   Prescriptions Last Dose Informant Patient Reported? Taking?   albuterol (2.5 mg/3 mL) 0.083 % nebulizer solution   No No   Sig: Take 3 mL (2.5 mg total) by nebulization every 6 (six) hours as needed for wheezing or shortness of breath   hydrocortisone 2.5 % lotion   No No   Sig: Apply topically 2 (two) times a day for 14 days   naproxen (Naprosyn) 500 mg tablet   No No   Sig: Take 1 tablet (500 mg total) by mouth 2 (two) times a day with meals for 21 days   ondansetron (ZOFRAN-ODT) 4 mg disintegrating tablet   No No   Sig: Take 1 tablet (4 mg total) by mouth every 6 (six) hours as needed for nausea or vomiting   predniSONE 20 mg tablet   No No   Sig: Take 2 tablets (40 mg total) by mouth daily for 5 days      Facility-Administered Medications: None     Patient's Medications   Discharge Prescriptions    No medications on file     No discharge procedures on file.  ED SEPSIS DOCUMENTATION   Time reflects when diagnosis was documented in both MDM as applicable and the Disposition within this note       Time User Action Codes Description Comment     2/21/2025 11:27 AM Talha Barbour [J02.9] Viral pharyngitis     2/21/2025 11:27 AM Talha Barbour [J06.9] URI (upper respiratory infection)                  Talha Barbour,   02/21/25 1132

## 2025-02-21 NOTE — DISCHARGE INSTRUCTIONS
COVID flu, strep test was negative.    Follow-up with your primary care provider.  Come back for new or worsening symptoms.

## 2025-03-09 ENCOUNTER — HOSPITAL ENCOUNTER (EMERGENCY)
Facility: HOSPITAL | Age: 25
Discharge: HOME | End: 2025-03-09
Attending: EMERGENCY MEDICINE | Admitting: EMERGENCY MEDICINE
Payer: COMMERCIAL

## 2025-03-09 VITALS
SYSTOLIC BLOOD PRESSURE: 118 MMHG | TEMPERATURE: 98.1 F | DIASTOLIC BLOOD PRESSURE: 64 MMHG | RESPIRATION RATE: 17 BRPM | OXYGEN SATURATION: 98 % | HEART RATE: 80 BPM

## 2025-03-09 DIAGNOSIS — J45.909 ASTHMA: Primary | ICD-10-CM

## 2025-03-09 DIAGNOSIS — J45.21 MILD INTERMITTENT ASTHMA WITH EXACERBATION: ICD-10-CM

## 2025-03-09 PROCEDURE — 94640 AIRWAY INHALATION TREATMENT: CPT

## 2025-03-09 PROCEDURE — 99284 EMERGENCY DEPT VISIT MOD MDM: CPT | Performed by: EMERGENCY MEDICINE

## 2025-03-09 PROCEDURE — 99282 EMERGENCY DEPT VISIT SF MDM: CPT

## 2025-03-09 RX ORDER — ALBUTEROL SULFATE 0.83 MG/ML
2.5 SOLUTION RESPIRATORY (INHALATION) EVERY 6 HOURS PRN
Qty: 75 ML | Refills: 0 | Status: SHIPPED | OUTPATIENT
Start: 2025-03-09 | End: 2025-04-08

## 2025-03-09 RX ORDER — IPRATROPIUM BROMIDE AND ALBUTEROL SULFATE 2.5; .5 MG/3ML; MG/3ML
3 SOLUTION RESPIRATORY (INHALATION) ONCE
Status: COMPLETED | OUTPATIENT
Start: 2025-03-09 | End: 2025-03-09

## 2025-03-09 RX ADMIN — IPRATROPIUM BROMIDE AND ALBUTEROL SULFATE 3 ML: .5; 3 SOLUTION RESPIRATORY (INHALATION) at 21:08

## 2025-03-09 NOTE — Clinical Note
Evonne Seth was seen and treated in our emergency department on 3/9/2025.            none    Diagnosis: HIPPA information    Evonne  may return to work on return date.    She may return on this date: 03/10/2025         If you have any questions or concerns, please don't hesitate to call.      Abel Mathew MD    ______________________________           _______________          _______________  Hospital Representative                              Date                                Time

## 2025-03-09 NOTE — Clinical Note
Evonen Seth was seen and treated in our emergency department on 3/9/2025.            none    Diagnosis: HIPPA information    Evonne  may return to work on return date.    She may return on this date: 03/10/2025         If you have any questions or concerns, please don't hesitate to call.      Abel Mathew MD    ______________________________           _______________          _______________  Hospital Representative                              Date                                Time

## 2025-03-10 NOTE — ED ATTENDING ATTESTATION
3/9/2025  I, Kin Zepeda MD, saw and evaluated the patient. I have discussed the patient with the resident/non-physician practitioner and agree with the resident's/non-physician practitioner's findings, Plan of Care, and MDM as documented in the resident's/non-physician practitioner's note, except where noted. All available labs and Radiology studies were reviewed.  I was present for key portions of any procedure(s) performed by the resident/non-physician practitioner and I was immediately available to provide assistance.       At this point I agree with the current assessment done in the Emergency Department.  I have conducted an independent evaluation of this patient a history and physical is as follows:    24-year-old female with history of asthma presents to the emergency department requesting a refill for her nebulizer machine.  Patient reports increased wheezing over the past 2 days.  She states that she has been using her rescue inhaler as well as her nebulizer.  States that today she ran out of the albuterol for her nebulizer machine and would like a refill.  No fevers, chills, nausea, vomiting or diarrhea.  No shortness of breath.  No chest pain.  No lower leg pain or swelling.  No recent travel.    A 10 point review of systems was conducted and negative except for as stated above in HPI.    Physical Exam  Vitals and nursing note reviewed.   Constitutional:       General: She is not in acute distress.     Appearance: She is well-developed.   HENT:      Head: Normocephalic and atraumatic.   Eyes:      Conjunctiva/sclera: Conjunctivae normal.   Cardiovascular:      Rate and Rhythm: Normal rate and regular rhythm.      Heart sounds: No murmur heard.  Pulmonary:      Effort: Pulmonary effort is normal. No respiratory distress.      Breath sounds: Wheezing (Faint) present.   Abdominal:      Palpations: Abdomen is soft.      Tenderness: There is no abdominal tenderness.   Musculoskeletal:          General: No swelling.      Cervical back: Neck supple.   Skin:     General: Skin is warm and dry.      Capillary Refill: Capillary refill takes less than 2 seconds.   Neurological:      Mental Status: She is alert.   Psychiatric:         Mood and Affect: Mood normal.         ED Course     24-year-old female presented to the emergency department for evaluation of asthma.  On arrival patient awake, alert, oriented and in no acute distress.  Vital signs within normal limits.  On exam patient with minimal wheezing.  No signs of respiratory distress.  Patient treated with a DuoNeb here in the emergency department.  She is appropriate for discharge.  The patient was provided with a refill of her prescription for albuterol.  Recommendation was made for the patient to follow-up with a PCP.  Return precautions were discussed.    The patient (and/or family present) agrees with the plan for discharge and feels comfortable to go home with proper follow-up. Diagnostic tests were reviewed. Diagnosis, care plan and treatment options were discussed. All questions were answered prior to discharge. I considered the patient's other medical conditions as applicable/noted above in my medical decision making. Advised the patient to return for worsening or additional problems. The patient (and/or family present) verbalized understanding of the discharge instructions and warnings that would necessitate return to the Emergency Department.        Critical Care Time  Procedures

## 2025-03-10 NOTE — ED PROVIDER NOTES
Time reflects when diagnosis was documented in both MDM as applicable and the Disposition within this note       Time User Action Codes Description Comment    3/9/2025  9:00 PM Abel Mathew Add [J45.909] Asthma     3/9/2025  9:45 PM Abel Mathew Add [J45.21] Mild intermittent asthma with exacerbation           ED Disposition       ED Disposition   Discharge    Condition   Stable    Date/Time   Sun Mar 9, 2025  9:42 PM    Comment   Evonne Seth discharge to home/self care.                   Assessment & Plan       Medical Decision Making  24-year-old female who presents due to wheezing.  Suspected asthma exacerbation.  Providing duo nebulizer.  Significant improvement in patient's symptoms after nebulization treatment with no wheezing appreciated on physical exam.  Patient stable for discharge home.  Return precautions discussed.    Risk  Prescription drug management.             Medications   ipratropium-albuterol (DUO-NEB) 0.5-2.5 mg/3 mL inhalation solution 3 mL (3 mL Nebulization Given 3/9/25 2108)       ED Risk Strat Scores                            SBIRT 22yo+      Flowsheet Row Most Recent Value   Initial Alcohol Screen: US AUDIT-C     1. How often do you have a drink containing alcohol? 0 Filed at: 03/09/2025 2051   2. How many drinks containing alcohol do you have on a typical day you are drinking?  0 Filed at: 03/09/2025 2051   3b. FEMALE Any Age, or MALE 65+: How often do you have 4 or more drinks on one occassion? 0 Filed at: 03/09/2025 2051   Audit-C Score 0 Filed at: 03/09/2025 2051   CAROLANN: How many times in the past year have you...    Used an illegal drug or used a prescription medication for non-medical reasons? Never Filed at: 03/09/2025 2051                            History of Present Illness       Chief Complaint   Patient presents with    Asthma     Pt stating feeling wheezy last 2 days. Used rescue inhaler without much relief. Needs Albuterol sent to [facility] for her neb       Past  Medical History:   Diagnosis Date    Asthma       History reviewed. No pertinent surgical history.   History reviewed. No pertinent family history.   Social History     Tobacco Use    Smoking status: Former     Current packs/day: 0.25     Average packs/day: 0.3 packs/day for 2.2 years (0.5 ttl pk-yrs)     Types: Cigarettes     Start date: 12/29/2022    Smokeless tobacco: Never   Vaping Use    Vaping status: Never Used   Substance Use Topics    Alcohol use: Yes     Comment: socially    Drug use: Yes     Frequency: 1.0 times per week     Types: Marijuana     Comment: last used; 8/31/24      E-Cigarette/Vaping    E-Cigarette Use Never User       E-Cigarette/Vaping Substances    Nicotine Yes       I have reviewed and agree with the history as documented.     24-year-old female with past medical history of asthma presenting due to wheezing.  She states that she has a history of very mild asthma for which she takes albuterol occasionally.  She states that nebulized albuterol works better than her inhaler.  She took her last dose of nebulized albuterol this morning with significant improvement in her symptoms.  She states that she still has mild wheezing and would like a prescription for nebulized albuterol.  Denying chest pain, shortness of breath, nausea, vomiting, diarrhea, fever, chills, abdominal pain, changes in urination, changes in bowel habits          Review of Systems   All other systems reviewed and are negative.          Objective       ED Triage Vitals [03/09/25 2050]   Temperature Pulse Blood Pressure Respirations SpO2 Patient Position - Orthostatic VS   98.1 °F (36.7 °C) 80 118/64 17 98 % Sitting      Temp Source Heart Rate Source BP Location FiO2 (%) Pain Score    Oral Monitor Left arm -- --      Vitals      Date and Time Temp Pulse SpO2 Resp BP Pain Score FACES Pain Rating User   03/09/25 2050 98.1 °F (36.7 °C) 80 98 % 17 118/64 -- -- CW            Physical Exam  Constitutional:       Appearance: Normal  appearance. She is not ill-appearing.   HENT:      Head: Normocephalic and atraumatic.      Nose: Nose normal.      Mouth/Throat:      Mouth: Mucous membranes are moist.      Pharynx: Oropharynx is clear.   Eyes:      Extraocular Movements: Extraocular movements intact.      Conjunctiva/sclera: Conjunctivae normal.      Pupils: Pupils are equal, round, and reactive to light.   Cardiovascular:      Rate and Rhythm: Normal rate and regular rhythm.      Heart sounds: No murmur heard.  Pulmonary:      Effort: Pulmonary effort is normal.      Breath sounds: Wheezing (Very minor expiratory wheezes) present. No rhonchi or rales.   Abdominal:      General: Abdomen is flat. Bowel sounds are normal.      Palpations: Abdomen is soft.      Tenderness: There is no abdominal tenderness.   Musculoskeletal:         General: No tenderness.      Right lower leg: No edema.      Left lower leg: No edema.   Skin:     General: Skin is warm and dry.   Neurological:      General: No focal deficit present.      Mental Status: She is alert and oriented to person, place, and time.   Psychiatric:         Mood and Affect: Mood normal.         Behavior: Behavior normal.         Results Reviewed       None            No orders to display       Procedures    ED Medication and Procedure Management   Prior to Admission Medications   Prescriptions Last Dose Informant Patient Reported? Taking?   albuterol (2.5 mg/3 mL) 0.083 % nebulizer solution   No No   Sig: Take 3 mL (2.5 mg total) by nebulization every 6 (six) hours as needed for wheezing or shortness of breath   albuterol (2.5 mg/3 mL) 0.083 % nebulizer solution   No Yes   Sig: Take 3 mL (2.5 mg total) by nebulization every 6 (six) hours as needed for wheezing or shortness of breath   hydrocortisone 2.5 % lotion   No No   Sig: Apply topically 2 (two) times a day for 14 days   naproxen (Naprosyn) 500 mg tablet   No No   Sig: Take 1 tablet (500 mg total) by mouth 2 (two) times a day with meals for  21 days   ondansetron (ZOFRAN-ODT) 4 mg disintegrating tablet   No No   Sig: Take 1 tablet (4 mg total) by mouth every 6 (six) hours as needed for nausea or vomiting      Facility-Administered Medications: None     Current Discharge Medication List        CONTINUE these medications which have CHANGED    Details   albuterol (2.5 mg/3 mL) 0.083 % nebulizer solution Take 3 mL (2.5 mg total) by nebulization every 6 (six) hours as needed for wheezing or shortness of breath  Qty: 75 mL, Refills: 0    Associated Diagnoses: Mild intermittent asthma with exacerbation           CONTINUE these medications which have NOT CHANGED    Details   hydrocortisone 2.5 % lotion Apply topically 2 (two) times a day for 14 days  Qty: 50 mL, Refills: 0    Associated Diagnoses: Eczema, unspecified type      naproxen (Naprosyn) 500 mg tablet Take 1 tablet (500 mg total) by mouth 2 (two) times a day with meals for 21 days  Qty: 42 tablet, Refills: 0    Associated Diagnoses: Pain, dental      ondansetron (ZOFRAN-ODT) 4 mg disintegrating tablet Take 1 tablet (4 mg total) by mouth every 6 (six) hours as needed for nausea or vomiting  Qty: 20 tablet, Refills: 0    Associated Diagnoses: Gastroenteritis; Nausea           No discharge procedures on file.  ED SEPSIS DOCUMENTATION   Time reflects when diagnosis was documented in both MDM as applicable and the Disposition within this note       Time User Action Codes Description Comment    3/9/2025  9:00 PM Abel Mathew [J45.909] Asthma     3/9/2025  9:45 PM Abel Mathew [J45.21] Mild intermittent asthma with exacerbation                  Abel Mathew MD  03/09/25 8610

## 2025-03-10 NOTE — DISCHARGE INSTRUCTIONS
The referral to [facility]'s pulmonology as well as [facility]'s family medicine has been made please call to set up care.  Please return to the emergency department if you experience worsening of your symptoms

## 2025-04-27 ENCOUNTER — HOSPITAL ENCOUNTER (EMERGENCY)
Facility: HOSPITAL | Age: 25
Discharge: HOME | End: 2025-04-27
Attending: EMERGENCY MEDICINE
Payer: COMMERCIAL

## 2025-04-27 ENCOUNTER — APPOINTMENT (EMERGENCY)
Dept: RADIOLOGY | Facility: HOSPITAL | Age: 25
End: 2025-04-27
Payer: COMMERCIAL

## 2025-04-27 VITALS
DIASTOLIC BLOOD PRESSURE: 67 MMHG | RESPIRATION RATE: 16 BRPM | HEART RATE: 64 BPM | OXYGEN SATURATION: 100 % | TEMPERATURE: 98.3 F | SYSTOLIC BLOOD PRESSURE: 118 MMHG

## 2025-04-27 DIAGNOSIS — R31.9 HEMATURIA: ICD-10-CM

## 2025-04-27 DIAGNOSIS — R11.0 NAUSEA: ICD-10-CM

## 2025-04-27 DIAGNOSIS — K52.9 GASTROENTERITIS: Primary | ICD-10-CM

## 2025-04-27 LAB
ALBUMIN SERPL BCG-MCNC: 4.7 G/DL (ref 3.5–5)
ALP SERPL-CCNC: 63 U/L (ref 34–104)
ALT SERPL W P-5'-P-CCNC: 12 U/L (ref 7–52)
ANION GAP SERPL CALCULATED.3IONS-SCNC: 7 MMOL/L (ref 4–13)
AST SERPL W P-5'-P-CCNC: 16 U/L (ref 13–39)
BACTERIA UR QL AUTO: ABNORMAL /HPF
BASOPHILS # BLD AUTO: 0.05 THOUSANDS/ÂΜL (ref 0–0.1)
BASOPHILS NFR BLD AUTO: 1 % (ref 0–1)
BILIRUB SERPL-MCNC: 0.33 MG/DL (ref 0.2–1)
BILIRUB UR QL STRIP: NEGATIVE
BUN SERPL-MCNC: 10 MG/DL (ref 5–25)
CALCIUM SERPL-MCNC: 9.2 MG/DL (ref 8.4–10.2)
CHLORIDE SERPL-SCNC: 105 MMOL/L (ref 96–108)
CLARITY UR: CLEAR
CO2 SERPL-SCNC: 25 MMOL/L (ref 21–32)
COLOR UR: YELLOW
CREAT SERPL-MCNC: 0.65 MG/DL (ref 0.6–1.3)
EOSINOPHIL # BLD AUTO: 0.3 THOUSAND/ÂΜL (ref 0–0.61)
EOSINOPHIL NFR BLD AUTO: 5 % (ref 0–6)
ERYTHROCYTE [DISTWIDTH] IN BLOOD BY AUTOMATED COUNT: 13.3 % (ref 11.6–15.1)
EXT PREGNANCY TEST URINE: NEGATIVE
EXT. CONTROL: NORMAL
GFR SERPL CREATININE-BSD FRML MDRD: 124 ML/MIN/1.73SQ M
GLUCOSE SERPL-MCNC: 86 MG/DL (ref 65–140)
GLUCOSE UR STRIP-MCNC: NEGATIVE MG/DL
HCT VFR BLD AUTO: 40.9 % (ref 34.8–46.1)
HGB BLD-MCNC: 12.8 G/DL (ref 11.5–15.4)
HGB UR QL STRIP.AUTO: ABNORMAL
IMM GRANULOCYTES # BLD AUTO: 0.01 THOUSAND/UL (ref 0–0.2)
IMM GRANULOCYTES NFR BLD AUTO: 0 % (ref 0–2)
KETONES UR STRIP-MCNC: NEGATIVE MG/DL
LEUKOCYTE ESTERASE UR QL STRIP: NEGATIVE
LIPASE SERPL-CCNC: 18 U/L (ref 11–82)
LYMPHOCYTES # BLD AUTO: 2.39 THOUSANDS/ÂΜL (ref 0.6–4.47)
LYMPHOCYTES NFR BLD AUTO: 37 % (ref 14–44)
MCH RBC QN AUTO: 27.6 PG (ref 26.8–34.3)
MCHC RBC AUTO-ENTMCNC: 31.3 G/DL (ref 31.4–37.4)
MCV RBC AUTO: 88 FL (ref 82–98)
MONOCYTES # BLD AUTO: 0.58 THOUSAND/ÂΜL (ref 0.17–1.22)
MONOCYTES NFR BLD AUTO: 9 % (ref 4–12)
MUCOUS THREADS UR QL AUTO: ABNORMAL
NEUTROPHILS # BLD AUTO: 3.11 THOUSANDS/ÂΜL (ref 1.85–7.62)
NEUTS SEG NFR BLD AUTO: 48 % (ref 43–75)
NITRITE UR QL STRIP: NEGATIVE
NON-SQ EPI CELLS URNS QL MICRO: ABNORMAL /HPF
NRBC BLD AUTO-RTO: 0 /100 WBCS
PH UR STRIP.AUTO: 6 [PH]
PLATELET # BLD AUTO: 264 THOUSANDS/UL (ref 149–390)
PMV BLD AUTO: 10.1 FL (ref 8.9–12.7)
POTASSIUM SERPL-SCNC: 3.8 MMOL/L (ref 3.5–5.3)
PROT SERPL-MCNC: 7.2 G/DL (ref 6.4–8.4)
PROT UR STRIP-MCNC: NEGATIVE MG/DL
RBC # BLD AUTO: 4.64 MILLION/UL (ref 3.81–5.12)
RBC #/AREA URNS AUTO: ABNORMAL /HPF
SODIUM SERPL-SCNC: 137 MMOL/L (ref 135–147)
SP GR UR STRIP.AUTO: 1.02 (ref 1–1.03)
UROBILINOGEN UR QL STRIP.AUTO: 1 E.U./DL
WBC # BLD AUTO: 6.44 THOUSAND/UL (ref 4.31–10.16)
WBC #/AREA URNS AUTO: ABNORMAL /HPF

## 2025-04-27 PROCEDURE — 96375 TX/PRO/DX INJ NEW DRUG ADDON: CPT

## 2025-04-27 PROCEDURE — 99283 EMERGENCY DEPT VISIT LOW MDM: CPT

## 2025-04-27 PROCEDURE — 80053 COMPREHEN METABOLIC PANEL: CPT | Performed by: PHYSICIAN ASSISTANT

## 2025-04-27 PROCEDURE — 71045 X-RAY EXAM CHEST 1 VIEW: CPT

## 2025-04-27 PROCEDURE — 83690 ASSAY OF LIPASE: CPT | Performed by: PHYSICIAN ASSISTANT

## 2025-04-27 PROCEDURE — 81025 URINE PREGNANCY TEST: CPT | Performed by: PHYSICIAN ASSISTANT

## 2025-04-27 PROCEDURE — 81001 URINALYSIS AUTO W/SCOPE: CPT | Performed by: PHYSICIAN ASSISTANT

## 2025-04-27 PROCEDURE — 96374 THER/PROPH/DIAG INJ IV PUSH: CPT

## 2025-04-27 PROCEDURE — 99285 EMERGENCY DEPT VISIT HI MDM: CPT | Performed by: PHYSICIAN ASSISTANT

## 2025-04-27 PROCEDURE — 85025 COMPLETE CBC W/AUTO DIFF WBC: CPT | Performed by: PHYSICIAN ASSISTANT

## 2025-04-27 PROCEDURE — 96361 HYDRATE IV INFUSION ADD-ON: CPT

## 2025-04-27 PROCEDURE — 36415 COLL VENOUS BLD VENIPUNCTURE: CPT | Performed by: PHYSICIAN ASSISTANT

## 2025-04-27 PROCEDURE — 81003 URINALYSIS AUTO W/O SCOPE: CPT | Performed by: PHYSICIAN ASSISTANT

## 2025-04-27 RX ORDER — DICYCLOMINE HCL 20 MG
20 TABLET ORAL ONCE
Status: COMPLETED | OUTPATIENT
Start: 2025-04-27 | End: 2025-04-27

## 2025-04-27 RX ORDER — ONDANSETRON 2 MG/ML
4 INJECTION INTRAMUSCULAR; INTRAVENOUS ONCE
Status: COMPLETED | OUTPATIENT
Start: 2025-04-27 | End: 2025-04-27

## 2025-04-27 RX ORDER — FAMOTIDINE 10 MG/ML
20 INJECTION, SOLUTION INTRAVENOUS ONCE
Status: COMPLETED | OUTPATIENT
Start: 2025-04-27 | End: 2025-04-27

## 2025-04-27 RX ORDER — ONDANSETRON 4 MG/1
4 TABLET, ORALLY DISINTEGRATING ORAL EVERY 6 HOURS PRN
Qty: 12 TABLET | Refills: 0 | Status: SHIPPED | OUTPATIENT
Start: 2025-04-27 | End: 2025-04-30

## 2025-04-27 RX ADMIN — DICYCLOMINE HYDROCHLORIDE 20 MG: 20 TABLET ORAL at 15:24

## 2025-04-27 RX ADMIN — ONDANSETRON 4 MG: 2 INJECTION INTRAMUSCULAR; INTRAVENOUS at 14:26

## 2025-04-27 RX ADMIN — SODIUM CHLORIDE 500 ML: 0.9 INJECTION, SOLUTION INTRAVENOUS at 14:27

## 2025-04-27 RX ADMIN — FAMOTIDINE 20 MG: 10 INJECTION, SOLUTION INTRAVENOUS at 14:26

## 2025-04-27 NOTE — Clinical Note
Evonne Seth was seen and treated in our emergency department on 4/27/2025.                Diagnosis:     Evonne  may return to work on return date.    She may return on this date: 04/29/2025         If you have any questions or concerns, please don't hesitate to call.      Pedrito Shell PA-C    ______________________________           _______________          _______________  Hospital Representative                              Date                                Time

## 2025-04-27 NOTE — DISCHARGE INSTRUCTIONS
"Patient Education     Viral gastroenteritis in adults   The Basics   Written by the doctors and editors at [facility]   What is viral gastroenteritis? -- Viral gastroenteritis is an infection that can cause diarrhea and vomiting. It happens when a person's stomach and intestines get infected with a virus (figure 1). One of the most common causes of gastroenteritis is norovirus. But other viruses can cause it, too.  People can get viral gastroenteritis if they:   Touch an infected person or a surface with the virus on it, and then don't wash their hands   Eat foods or drink liquids with the virus in them. If people with the virus don't wash their hands, they can spread it to food or liquids they touch.  What are the symptoms of viral gastroenteritis? -- The infection causes diarrhea and vomiting. People can have either diarrhea or vomiting, or both. These symptoms usually start suddenly, and can be severe.  Viral gastroenteritis can also cause:   Fever   Headache or muscle aches   Belly pain or cramping   Loss of appetite  If you have a lot of diarrhea and vomiting, your body can lose too much water. This is known as \"dehydration.\" Dehydration can make you feel thirsty, tired, dizzy, or even confused. It can also make your urine look dark yellow.  Severe dehydration can be life-threatening. Older people are more likely to get severe dehydration.  Will I need tests? -- Not usually. Your doctor or nurse should be able to tell if you have viral gastroenteritis by learning about your symptoms and doing an exam. But the doctor or nurse might do tests to check for dehydration or to see which virus is causing the infection. These tests can include:   Blood tests   Urine tests   Tests on a sample of bowel movement  Is there anything I can do on my own to feel better? -- Yes. You need to replace the body's fluids that are lost through vomiting and diarrhea:   Drink fluids when you are able. It might help to take small sips " "every 15 to 30 minutes. Try to drink more as you start to feel better.   When you have a lot of vomiting or diarrhea, your body loses both water and salt. Drinking fluids that contain some salt can help replace what your body has lost. Examples include \"oral rehydration solutions,\" sports drinks, and broth. If you drink a lot of plain water, make sure you are also eating. This will help your body keep the right salt and water balance.   Avoid drinks with a lot of sugar, like juice or soda. Avoid alcohol, too.   Eat when you are able. If you can keep food down, it's best to eat lean meats, fruits, vegetables, and whole-grain breads and cereals. Avoid eating foods with a lot of fat or sugar, which can make symptoms worse.  If you are an adult younger than 65 and you have a new bout of diarrhea, and no fever and no blood in your bowel movements, you can take medicine to stop diarrhea such as loperamide (brand name: Imodium) for 1 to 2 days. But if you are older than 65, have a fever, or have blood in your bowel movements, do not take these medicines without checking with your doctor.  If you have diabetes, you might need to check your blood sugar more often until you feel better. Ask your doctor or nurse about this.  Should I call the doctor or nurse? -- Call the doctor or nurse if you:   Have any symptoms of dehydration, like feeling very tired, thirsty, dizzy, or confused   Have diarrhea or vomiting that lasts longer than a few days   Vomit up blood, have bloody diarrhea, or have severe belly pain   Haven't been able to drink anything for many hours   Haven't needed to urinate in the past 6 to 8 hours (during the day)  How is viral gastroenteritis treated? -- Most people do not need any treatment, because their symptoms will get better on their own. But people with severe dehydration might need treatment in the hospital. This involves getting fluids through a thin tube that goes into a vein, called an \"IV.\"  Doctors " "do not treat viral gastroenteritis with antibiotics. That's because antibiotics treat infections that are caused by bacteria, not viruses.  Can viral gastroenteritis be prevented? -- Sometimes. To lower the chance of getting or spreading the infection, wash your hands well with soap and water:   After you use the bathroom   Before you eat   Before you prepare food  Also, if you are caring for a child in diapers, wash your hands well after changing diapers. Do not change diapers near where you cook or eat food.  All topics are updated as new evidence becomes available and our peer review process is complete.  This topic retrieved from [employer]tapt on: Mar 06, 2024.  Topic 29914 Version 17.0  Release: 32.2.4 - C32.64  © 2024 UpToDate, Inc. and/or its affiliates. All rights reserved.  figure 1: Digestive system     This drawing shows the organs in the body that process food. Together, these organs are called the \"digestive system\" or \"digestive tract.\" As food travels through this system, the body absorbs nutrients and water.  Graphic 36578 Version 5.0  Consumer Information Use and Disclaimer   Disclaimer: This generalized information is a limited summary of diagnosis, treatment, and/or medication information. It is not meant to be comprehensive and should be used as a tool to help the user understand and/or assess potential diagnostic and treatment options. It does NOT include all information about conditions, treatments, medications, side effects, or risks that may apply to a specific patient. It is not intended to be medical advice or a substitute for the medical advice, diagnosis, or treatment of a health care provider based on the health care provider's examination and assessment of a patient's specific and unique circumstances. Patients must speak with a health care provider for complete information about their health, medical questions, and treatment options, including any risks or benefits regarding use of " medications. This information does not endorse any treatments or medications as safe, effective, or approved for treating a specific patient. UpToDate, Inc. and its affiliates disclaim any warranty or liability relating to this information or the use thereof.The use of this information is governed by the Terms of Use, available at https://www.[employer].com/en/know/clinical-effectiveness-terms. 2024© UpToDate, Inc. and its affiliates and/or licensors. All rights reserved.  Copyright   © 2024 UpToDate, Inc. and/or its affiliates. All rights reserved.

## 2025-04-27 NOTE — ED PROVIDER NOTES
Time reflects when diagnosis was documented in both MDM as applicable and the Disposition within this note       Time User Action Codes Description Comment    4/27/2025  3:18 PM Pedrito Shell [K52.9] Gastroenteritis     4/27/2025  3:19 PM Pedrito Shell [R11.0] Nausea     4/27/2025  3:47 PM Pedrito Shell [R31.9] Hematuria           ED Disposition       ED Disposition   Discharge    Condition   Stable    Date/Time   Sun Apr 27, 2025  3:18 PM    Comment   Evonne Seth discharge to home/self care.                   Assessment & Plan       Medical Decision Making  Patient is a 24-year-old female with history of asthma with no significant past surgical history that presents to the emergency department with nausea and watery diarrhea symptoms for 1 day.   No acute abdomen, no surgical abdomen  Patient hemodynamically stable and afebrile  No sirs  No leukocytosis, no bandemia, normal H&H  Normal electrolytes, normal kidney function, normal anion gap  Urinalysis not indicative urinary tract infection, WBC 0-5, RBC 4-10 RBCs, innumerable amount of epithelial cells on urinalysis, no nitrites; patient states that her menstrual cycle ended last week, denies recent sexual activity, denies vaginal bleeding.  No flank pain, doubt kidney stone, no dysuria  Chest x-ray with no acute cardiopulmonary disease on wet read  Negative urine pregnancy    Delivered antiemetics in the emergency department; patient demonstrates decrease in presenting nausea ED symptomatology status post medication delivery  Challenge successfully negotiated with 1 dose of Bentyl  Ddx likely and not limited to enteritis, esophageal spasm, colitis, diverticulitis, urinary tract infection, nephrolithiasis/ureterolithiasis  Will treat for gastroenteritis  Work note delivered  Counseled patient on refraining from spicy foods, greasy foods, chocolate, caffeine, graduate to baseline diet as tolerated, start with bland diet  Prescribed Zofran and  counseled patient medication administration and side effects  Follow-up with PCP  Follow up with emergency department if symptoms persist or exacerbate  Patient demonstrates verbal understanding of all clinical laboratory and imaging findings, discharge instructions, follow-up, and verbally agrees with current treatment plan with teach back    *Due to voice recognition software, sound alike and misspelled words may be contained in the documentation*    Amount and/or Complexity of Data Reviewed  Labs: ordered. Decision-making details documented in ED Course.  Radiology: ordered and independent interpretation performed. Decision-making details documented in ED Course.    Risk  Prescription drug management.        ED Course as of 04/27/25 1548   Sun Apr 27, 2025   1506 Epithelial Cells(!): Innumerable  Likely contaminated sample given innumerable amount of epithelial cells, bacteria, 0-5 WBCs, no nitrites, patient denies dysuria symptomatology, doubt urinary tract infection at this time.   1511 Patient states that she had her menstrual cycle and 1 week prior to [place] presentation and further denies urinary symptoms.  Patient does denies recent sexual activity.  H&H normal, negative urine pregnancy, normal electrolytes, normal kidney function, no anion gap.  Will have patient follow-up with her PCP for further evaluation and return to ED precautions in place.       Medications   sodium chloride 0.9 % bolus 500 mL (0 mL Intravenous Stopped 4/27/25 1526)   ondansetron (ZOFRAN) injection 4 mg (4 mg Intravenous Given 4/27/25 1426)   Famotidine (PF) (PEPCID) injection 20 mg (20 mg Intravenous Given 4/27/25 1426)   dicyclomine (BENTYL) tablet 20 mg (20 mg Oral Given 4/27/25 1524)       ED Risk Strat Scores                    No data recorded                            History of Present Illness       Chief Complaint   Patient presents with    Nausea     Pt reports nausea starting last night, diarrhea     Patient is a  24-year-old female with history of asthma with no significant past surgical history that presents to the emergency department with nausea and watery diarrhea symptoms for 1 day.  Patient denies associated symptoms.  Patient states that she went to a cookout yesterday with friends and family and started with nausea and diarrhea symptoms after dinner last evening.  Patient denies recent antibiotic use.  Patient denies food allergy.  Patient denies palliative factors or provocative factors of food smell.  Patient denies noneffective treatment.  Patient further denies cough or other URI symptoms.  Patient denies fevers, chills, vomiting, and urinary symptoms.  Patient denies recent fall or recent trauma.  Patient denies sick contacts and recent travel.  Patient denies chest pain, shortness of breath, and abdominal pain.    Past Medical History:   Diagnosis Date    Asthma       History reviewed. No pertinent surgical history.   History reviewed. No pertinent family history.   Social History     Tobacco Use    Smoking status: Former     Current packs/day: 0.25     Average packs/day: 0.3 packs/day for 2.3 years (0.6 ttl pk-yrs)     Types: Cigarettes     Start date: 12/29/2022    Smokeless tobacco: Never   Vaping Use    Vaping status: Never Used   Substance Use Topics    Alcohol use: Yes     Comment: socially    Drug use: Yes     Frequency: 1.0 times per week     Types: Marijuana     Comment: last used; 8/31/24      E-Cigarette/Vaping    E-Cigarette Use Never User       E-Cigarette/Vaping Substances    Nicotine Yes       I have reviewed and agree with the history as documented.       History provided by:  Patient   used: No    Nausea  The primary symptoms include nausea and diarrhea. Primary symptoms do not include fever, abdominal pain, vomiting, dysuria, arthralgias or rash.   The illness does not include chills, constipation or back pain.       Review of Systems   Constitutional:  Negative for  activity change, appetite change, chills and fever.   HENT:  Negative for congestion, ear pain, postnasal drip, rhinorrhea, sinus pressure, sinus pain, sore throat and tinnitus.    Eyes:  Negative for photophobia, pain and visual disturbance.   Respiratory:  Negative for cough, chest tightness and shortness of breath.    Cardiovascular:  Negative for chest pain and palpitations.   Gastrointestinal:  Positive for diarrhea and nausea. Negative for abdominal pain, constipation and vomiting.   Genitourinary:  Negative for difficulty urinating, dysuria, flank pain, frequency, hematuria and urgency.   Musculoskeletal:  Negative for arthralgias, back pain, gait problem, neck pain and neck stiffness.   Skin:  Negative for color change, pallor and rash.   Allergic/Immunologic: Negative for environmental allergies and food allergies.   Neurological:  Negative for dizziness, seizures, syncope, weakness, numbness and headaches.   Psychiatric/Behavioral:  Negative for confusion.    All other systems reviewed and are negative.          Objective       ED Triage Vitals   Temperature Pulse Blood Pressure Respirations SpO2 Patient Position - Orthostatic VS   04/27/25 1437 04/27/25 1402 04/27/25 1402 04/27/25 1402 04/27/25 1402 --   98.3 °F (36.8 °C) 64 118/67 16 100 %       Temp src Heart Rate Source BP Location FiO2 (%) Pain Score    -- -- -- -- --              Vitals      Date and Time Temp Pulse SpO2 Resp BP Pain Score FACES Pain Rating User   04/27/25 1437 98.3 °F (36.8 °C) -- -- -- -- -- -- RG   04/27/25 1402 -- 64 100 % 16 118/67 -- -- BG            Physical Exam  Vitals and nursing note reviewed.   Constitutional:       General: She is awake.      Appearance: Normal appearance. She is well-developed and normal weight. She is not ill-appearing, toxic-appearing or diaphoretic.      Comments: /67   Pulse 64   Temp 98.3 °F (36.8 °C)   Resp 16   SpO2 100%      HENT:      Head: Normocephalic and atraumatic.      Jaw:  There is normal jaw occlusion.      Right Ear: Hearing, tympanic membrane, ear canal and external ear normal. No decreased hearing noted. No drainage, swelling or tenderness. No mastoid tenderness.      Left Ear: Hearing, tympanic membrane, ear canal and external ear normal. No decreased hearing noted. No drainage, swelling or tenderness. No mastoid tenderness.      Nose: Nose normal.      Mouth/Throat:      Lips: Pink.      Mouth: Mucous membranes are moist.      Pharynx: Oropharynx is clear. Uvula midline.   Eyes:      General: Lids are normal. Vision grossly intact. Gaze aligned appropriately.         Right eye: No discharge.         Left eye: No discharge.      Extraocular Movements: Extraocular movements intact.      Conjunctiva/sclera: Conjunctivae normal.      Pupils: Pupils are equal, round, and reactive to light.   Neck:      Vascular: No JVD.      Trachea: Trachea and phonation normal. No tracheal tenderness or tracheal deviation.   Cardiovascular:      Rate and Rhythm: Normal rate and regular rhythm.      Pulses: Normal pulses.           Radial pulses are 2+ on the right side and 2+ on the left side.        Posterior tibial pulses are 2+ on the right side and 2+ on the left side.      Heart sounds: Normal heart sounds.   Pulmonary:      Effort: Pulmonary effort is normal.      Breath sounds: Normal breath sounds and air entry. No stridor. No decreased breath sounds, wheezing, rhonchi or rales.   Abdominal:      General: Abdomen is flat. Bowel sounds are normal. There is no distension.      Palpations: Abdomen is soft. Abdomen is not rigid.      Tenderness: There is no abdominal tenderness. There is no right CVA tenderness, left CVA tenderness, guarding or rebound.      Comments: No abdominal tenderness with deep palpation all 4 quadrants.   Musculoskeletal:         General: Normal range of motion.      Cervical back: Full passive range of motion without pain, normal range of motion and neck supple. No  rigidity. No spinous process tenderness or muscular tenderness. Normal range of motion.   Feet:      Right foot:      Toenail Condition: Right toenails are normal.      Left foot:      Toenail Condition: Left toenails are normal.   Lymphadenopathy:      Head:      Right side of head: No submental, submandibular, tonsillar, preauricular, posterior auricular or occipital adenopathy.      Left side of head: No submental, submandibular, tonsillar, preauricular, posterior auricular or occipital adenopathy.      Cervical: No cervical adenopathy.      Right cervical: No superficial, deep or posterior cervical adenopathy.     Left cervical: No superficial, deep or posterior cervical adenopathy.   Skin:     General: Skin is warm.      Capillary Refill: Capillary refill takes less than 2 seconds.      Findings: No rash.   Neurological:      General: No focal deficit present.      Mental Status: She is alert and oriented to person, place, and time. Mental status is at baseline.      GCS: GCS eye subscore is 4. GCS verbal subscore is 5. GCS motor subscore is 6.      Cranial Nerves: Cranial nerves 2-12 are intact.      Sensory: Sensation is intact. No sensory deficit.      Motor: Motor function is intact.      Coordination: Coordination is intact.      Gait: Gait is intact.   Psychiatric:         Attention and Perception: Attention normal.         Mood and Affect: Mood normal.         Speech: Speech normal.         Behavior: Behavior normal. Behavior is cooperative.         Thought Content: Thought content normal.         Judgment: Judgment normal.         Results Reviewed       Procedure Component Value Units Date/Time    Urine Microscopic [[id number]]  (Abnormal) Collected: 04/27/25 1422    Lab Status: Final result Specimen: Urine, Clean Catch Updated: 04/27/25 1457     RBC, UA 4-10 /hpf      WBC, UA 0-5 /hpf      Epithelial Cells Innumerable /hpf      Bacteria, UA Occasional /hpf      MUCUS THREADS Innumerable    Comprehensive  metabolic panel [[id number]] Collected: 04/27/25 1426    Lab Status: Final result Specimen: Blood from Arm, Left Updated: 04/27/25 1454     Sodium 137 mmol/L      Potassium 3.8 mmol/L      Chloride 105 mmol/L      CO2 25 mmol/L      ANION GAP 7 mmol/L      BUN 10 mg/dL      Creatinine 0.65 mg/dL      Glucose 86 mg/dL      Calcium 9.2 mg/dL      AST 16 U/L      ALT 12 U/L      Alkaline Phosphatase 63 U/L      Total Protein 7.2 g/dL      Albumin 4.7 g/dL      Total Bilirubin 0.33 mg/dL      eGFR 124 ml/min/1.73sq m     Narrative:      National Kidney Disease Foundation guidelines for Chronic Kidney Disease (CKD):     Stage 1 with normal or high GFR (GFR > 90 mL/min/1.73 square meters)    Stage 2 Mild CKD (GFR = 60-89 mL/min/1.73 square meters)    Stage 3A Moderate CKD (GFR = 45-59 mL/min/1.73 square meters)    Stage 3B Moderate CKD (GFR = 30-44 mL/min/1.73 square meters)    Stage 4 Severe CKD (GFR = 15-29 mL/min/1.73 square meters)    Stage 5 End Stage CKD (GFR <15 mL/min/1.73 square meters)  Note: GFR calculation is accurate only with a steady state creatinine    Lipase [[id number]]  (Normal) Collected: 04/27/25 1426    Lab Status: Final result Specimen: Blood from Arm, Left Updated: 04/27/25 1454     Lipase 18 u/L     UA w Reflex to Microscopic w Reflex to Culture [[id number]]  (Abnormal) Collected: 04/27/25 1422    Lab Status: Final result Specimen: Urine, Clean Catch Updated: 04/27/25 1438     Color, UA Yellow     Clarity, UA Clear     Specific Gravity, UA 1.025     pH, UA 6.0     Leukocytes, UA Negative     Nitrite, UA Negative     Protein, UA Negative mg/dl      Glucose, UA Negative mg/dl      Ketones, UA Negative mg/dl      Urobilinogen, UA 1.0 E.U./dl      Bilirubin, UA Negative     Occult Blood, UA 1+    CBC and differential [[id number]]  (Abnormal) Collected: 04/27/25 1426    Lab Status: Final result Specimen: Blood from Arm, Left Updated: 04/27/25 1436     WBC 6.44 Thousand/uL      RBC 4.64 Million/uL       Hemoglobin 12.8 g/dL      Hematocrit 40.9 %      MCV 88 fL      MCH 27.6 pg      MCHC 31.3 g/dL      RDW 13.3 %      MPV 10.1 fL      Platelets 264 Thousands/uL      nRBC 0 /100 WBCs      Segmented % 48 %      Immature Grans % 0 %      Lymphocytes % 37 %      Monocytes % 9 %      Eosinophils Relative 5 %      Basophils Relative 1 %      Absolute Neutrophils 3.11 Thousands/µL      Absolute Immature Grans 0.01 Thousand/uL      Absolute Lymphocytes 2.39 Thousands/µL      Absolute Monocytes 0.58 Thousand/µL      Eosinophils Absolute 0.30 Thousand/µL      Basophils Absolute 0.05 Thousands/µL     POCT pregnancy, urine [[id number]]  (Normal) Collected: 04/27/25 1431    Lab Status: Final result Updated: 04/27/25 1431     EXT Preg Test, Ur Negative     Control Valid            XR chest 1 view portable   ED Interpretation by Pedrito Shell PA-C (04/27 1436)   Chest x-ray with no acute cardiopulmonary disease on initial read by me.          Procedures    ED Medication and Procedure Management   Prior to Admission Medications   Prescriptions Last Dose Informant Patient Reported? Taking?   hydrocortisone 2.5 % lotion   No No   Sig: Apply topically 2 (two) times a day for 14 days   naproxen (Naprosyn) 500 mg tablet   No No   Sig: Take 1 tablet (500 mg total) by mouth 2 (two) times a day with meals for 21 days   ondansetron (ZOFRAN-ODT) 4 mg disintegrating tablet   No No   Sig: Take 1 tablet (4 mg total) by mouth every 6 (six) hours as needed for nausea or vomiting      Facility-Administered Medications: None     Discharge Medication List as of 4/27/2025  3:22 PM        CONTINUE these medications which have CHANGED    Details   ondansetron (ZOFRAN-ODT) 4 mg disintegrating tablet Take 1 tablet (4 mg total) by mouth every 6 (six) hours as needed for nausea or vomiting for up to 3 days, Starting Sun 4/27/2025, Until Wed 4/30/2025 at 2359, Normal           CONTINUE these medications which have NOT CHANGED    Details   hydrocortisone 2.5  % lotion Apply topically 2 (two) times a day for 14 days, Starting Thu 12/29/2022, Until Thu 1/12/2023, Normal      naproxen (Naprosyn) 500 mg tablet Take 1 tablet (500 mg total) by mouth 2 (two) times a day with meals for 21 days, Starting Sat 8/31/2024, Until Sat 9/21/2024, Normal           No discharge procedures on file.  ED SEPSIS DOCUMENTATION   Time reflects when diagnosis was documented in both MDM as applicable and the Disposition within this note       Time User Action Codes Description Comment    4/27/2025  3:18 PM Pedrito Shell [K52.9] Gastroenteritis     4/27/2025  3:19 PM Pedrito Shell [R11.0] Nausea     4/27/2025  3:47 PM Pedrito Shell [R31.9] Hematuria                  Pedrito Shell PA-C  04/27/25 2106

## 2025-04-28 ENCOUNTER — APPOINTMENT (EMERGENCY)
Dept: CT IMAGING | Facility: HOSPITAL | Age: 25
End: 2025-04-28
Payer: COMMERCIAL

## 2025-04-28 ENCOUNTER — HOSPITAL ENCOUNTER (EMERGENCY)
Facility: HOSPITAL | Age: 25
Discharge: HOME | End: 2025-04-28
Attending: EMERGENCY MEDICINE
Payer: COMMERCIAL

## 2025-04-28 VITALS
SYSTOLIC BLOOD PRESSURE: 109 MMHG | WEIGHT: 130 LBS | TEMPERATURE: 97.4 F | RESPIRATION RATE: 18 BRPM | OXYGEN SATURATION: 99 % | HEART RATE: 51 BPM | DIASTOLIC BLOOD PRESSURE: 59 MMHG | HEIGHT: 65 IN | BODY MASS INDEX: 21.66 KG/M2

## 2025-04-28 DIAGNOSIS — F12.188 CANNABIS HYPEREMESIS SYNDROME CONCURRENT WITH AND DUE TO CANNABIS ABUSE (HCC): ICD-10-CM

## 2025-04-28 DIAGNOSIS — R11.2 NAUSEA AND VOMITING: Primary | ICD-10-CM

## 2025-04-28 LAB
ALBUMIN SERPL BCG-MCNC: 4.8 G/DL (ref 3.5–5)
ALP SERPL-CCNC: 60 U/L (ref 34–104)
ALT SERPL W P-5'-P-CCNC: 11 U/L (ref 7–52)
ANION GAP SERPL CALCULATED.3IONS-SCNC: 12 MMOL/L (ref 4–13)
AST SERPL W P-5'-P-CCNC: 16 U/L (ref 13–39)
BASOPHILS # BLD AUTO: 0.07 THOUSANDS/ÂΜL (ref 0–0.1)
BASOPHILS NFR BLD AUTO: 1 % (ref 0–1)
BILIRUB SERPL-MCNC: 0.53 MG/DL (ref 0.2–1)
BUN SERPL-MCNC: 9 MG/DL (ref 5–25)
CALCIUM SERPL-MCNC: 9.5 MG/DL (ref 8.4–10.2)
CHLORIDE SERPL-SCNC: 106 MMOL/L (ref 96–108)
CO2 SERPL-SCNC: 19 MMOL/L (ref 21–32)
CREAT SERPL-MCNC: 0.58 MG/DL (ref 0.6–1.3)
EOSINOPHIL # BLD AUTO: 0.17 THOUSAND/ÂΜL (ref 0–0.61)
EOSINOPHIL NFR BLD AUTO: 1 % (ref 0–6)
ERYTHROCYTE [DISTWIDTH] IN BLOOD BY AUTOMATED COUNT: 13.1 % (ref 11.6–15.1)
GFR SERPL CREATININE-BSD FRML MDRD: 129 ML/MIN/1.73SQ M
GLUCOSE SERPL-MCNC: 144 MG/DL (ref 65–140)
HCT VFR BLD AUTO: 41.5 % (ref 34.8–46.1)
HGB BLD-MCNC: 13.3 G/DL (ref 11.5–15.4)
IMM GRANULOCYTES # BLD AUTO: 0.05 THOUSAND/UL (ref 0–0.2)
IMM GRANULOCYTES NFR BLD AUTO: 0 % (ref 0–2)
LIPASE SERPL-CCNC: 12 U/L (ref 11–82)
LYMPHOCYTES # BLD AUTO: 2.25 THOUSANDS/ÂΜL (ref 0.6–4.47)
LYMPHOCYTES NFR BLD AUTO: 18 % (ref 14–44)
MCH RBC QN AUTO: 28 PG (ref 26.8–34.3)
MCHC RBC AUTO-ENTMCNC: 32 G/DL (ref 31.4–37.4)
MCV RBC AUTO: 87 FL (ref 82–98)
MONOCYTES # BLD AUTO: 0.62 THOUSAND/ÂΜL (ref 0.17–1.22)
MONOCYTES NFR BLD AUTO: 5 % (ref 4–12)
NEUTROPHILS # BLD AUTO: 9.42 THOUSANDS/ÂΜL (ref 1.85–7.62)
NEUTS SEG NFR BLD AUTO: 75 % (ref 43–75)
NRBC BLD AUTO-RTO: 0 /100 WBCS
PLATELET # BLD AUTO: 269 THOUSANDS/UL (ref 149–390)
PMV BLD AUTO: 10.4 FL (ref 8.9–12.7)
POTASSIUM SERPL-SCNC: 3.4 MMOL/L (ref 3.5–5.3)
PROT SERPL-MCNC: 7.4 G/DL (ref 6.4–8.4)
RBC # BLD AUTO: 4.75 MILLION/UL (ref 3.81–5.12)
SODIUM SERPL-SCNC: 137 MMOL/L (ref 135–147)
WBC # BLD AUTO: 12.58 THOUSAND/UL (ref 4.31–10.16)

## 2025-04-28 PROCEDURE — 85025 COMPLETE CBC W/AUTO DIFF WBC: CPT | Performed by: EMERGENCY MEDICINE

## 2025-04-28 PROCEDURE — 74177 CT ABD & PELVIS W/CONTRAST: CPT

## 2025-04-28 PROCEDURE — 36415 COLL VENOUS BLD VENIPUNCTURE: CPT | Performed by: EMERGENCY MEDICINE

## 2025-04-28 PROCEDURE — 83690 ASSAY OF LIPASE: CPT | Performed by: EMERGENCY MEDICINE

## 2025-04-28 PROCEDURE — 99284 EMERGENCY DEPT VISIT MOD MDM: CPT

## 2025-04-28 PROCEDURE — 96361 HYDRATE IV INFUSION ADD-ON: CPT

## 2025-04-28 PROCEDURE — 99285 EMERGENCY DEPT VISIT HI MDM: CPT | Performed by: EMERGENCY MEDICINE

## 2025-04-28 PROCEDURE — 93005 ELECTROCARDIOGRAM TRACING: CPT

## 2025-04-28 PROCEDURE — 96374 THER/PROPH/DIAG INJ IV PUSH: CPT

## 2025-04-28 PROCEDURE — 80053 COMPREHEN METABOLIC PANEL: CPT | Performed by: EMERGENCY MEDICINE

## 2025-04-28 RX ORDER — DROPERIDOL 2.5 MG/ML
1.25 INJECTION, SOLUTION INTRAMUSCULAR; INTRAVENOUS ONCE
Status: COMPLETED | OUTPATIENT
Start: 2025-04-28 | End: 2025-04-28

## 2025-04-28 RX ADMIN — IOHEXOL 85 ML: 350 INJECTION, SOLUTION INTRAVENOUS at 15:06

## 2025-04-28 RX ADMIN — SODIUM CHLORIDE 1000 ML: 0.9 INJECTION, SOLUTION INTRAVENOUS at 14:50

## 2025-04-28 RX ADMIN — DROPERIDOL 1.25 MG: 2.5 INJECTION, SOLUTION INTRAMUSCULAR; INTRAVENOUS at 14:52

## 2025-04-28 NOTE — ED NOTES
D/c reviewed with pt. Pt verbalized understanding and has no further questions at this time.      Katie Schneider RN  04/28/25 5975

## 2025-04-28 NOTE — ED PROVIDER NOTES
Time reflects when diagnosis was documented in both MDM as applicable and the Disposition within this note       Time User Action Codes Description Comment    4/28/2025  5:05 PM Gregory Sal Add [R11.2] Nausea and vomiting     4/28/2025  5:05 PM Gregory Sal Add [F12.188] Cannabis hyperemesis syndrome concurrent with and due to cannabis abuse (HCC)           ED Disposition       ED Disposition   Discharge    Condition   Stable    Date/Time   Mon Apr 28, 2025  5:05 PM    Comment   Evonne Seth discharge to home/self care.                   Assessment & Plan       Medical Decision Making  This is a 24-year-old female presents to the emergency department complaining of nausea, vomiting, and diarrhea.  I considered viral illness, appendicitis, cannabis hyperemesis. These and other diagnoses were considered.         Amount and/or Complexity of Data Reviewed  Labs: ordered.  Radiology: ordered.    Risk  Prescription drug management.        ED Course as of 04/28/25 1948 Mon Apr 28, 2025 1947 The patient had lab work that was significant for a white count of 12.58, creatinine of 0.58, and potassium of 3.4.  These are not significantly different than her prior lab work evaluated yesterday.  The patient had a CAT scan of her abdomen pelvis that showed no acute abnormality.  The patient was sleeping on reevaluation, but then woke up and stated she felt no better.  She requested to be discharged.  She was discharged with follow-up to her primary care physician.       Medications   sodium chloride 0.9 % bolus 1,000 mL (0 mL Intravenous Stopped 4/28/25 1713)   droperidol (INAPSINE) injection 1.25 mg (1.25 mg Intravenous Given 4/28/25 1452)   iohexol (OMNIPAQUE) 350 MG/ML injection (SINGLE-DOSE) 85 mL (85 mL Intravenous Given 4/28/25 1506)       ED Risk Strat Scores                    No data recorded                            History of Present Illness       Chief Complaint   Patient presents with    Vomiting      Seen yesterday for same, last dose zofran 2 hours PTA       Past Medical History:   Diagnosis Date    Asthma       No past surgical history on file.   No family history on file.   Social History     Tobacco Use    Smoking status: Former     Current packs/day: 0.25     Average packs/day: 0.3 packs/day for 2.3 years (0.6 ttl pk-yrs)     Types: Cigarettes     Start date: 12/29/2022    Smokeless tobacco: Never   Vaping Use    Vaping status: Never Used   Substance Use Topics    Alcohol use: Yes     Comment: socially    Drug use: Yes     Frequency: 1.0 times per week     Types: Marijuana     Comment: last used 4/28      E-Cigarette/Vaping    E-Cigarette Use Never User       E-Cigarette/Vaping Substances    Nicotine Yes       I have reviewed and agree with the history as documented.     This is a 24-year-old female presents to the emergency department complaining of nausea and vomiting and diarrhea.  Patient states over the last 2 days she has had severe nausea and vomiting.  She states she cannot hold anything down.  She states that she was here yesterday and had no relief of her symptoms.  She states this has happened to her 1 time in the past where they gave her medication and she felt much better.  She denies fevers or chills.  She denies chest pain or trouble breathing.  She complains of diffuse abdominal pain.  She denies urinary symptoms.  She does state that she feels that she is constipated but had a loose bowel movement yesterday.  She denies blood in her urine or her vomit.        Review of Systems   All other systems reviewed and are negative.          Objective       ED Triage Vitals [04/28/25 1357]   Temperature Pulse Blood Pressure Respirations SpO2 Patient Position - Orthostatic VS   (!) 97.4 °F (36.3 °C) (!) 51 109/59 18 99 % Lying      Temp Source Heart Rate Source BP Location FiO2 (%) Pain Score    Oral Monitor Left arm -- --      Vitals      Date and Time Temp Pulse SpO2 Resp BP Pain Score FACES Pain  Rating User   04/28/25 1357 97.4 °F (36.3 °C) 51 99 % 18 109/59 -- -- EJN            Physical Exam  Constitutional:  Vital signs reviewed, appears uncomfortable  Eyes: Pupils equal round reactive to light and accommodation, extraocular muscles intact  HEENT: trachea midline, no JVD, moist mucous membranes  Respiratory: lung sounds clear throughout, no rhonchi, no rales  Cardiovascular: regular rate rhythm, no murmurs or rubs  Abdomen: soft, diffuse abdominal tenderness, nondistended, no rebound or guarding  Back: no CVA tenderness, normal inspection  Extremities: no edema, pulses equal in all 4 extremities  Neuro: awake, alert, oriented, no focal weakness  Skin: warm, dry, intact, no rashes noted    Results Reviewed       Procedure Component Value Units Date/Time    CMP [[id number]]  (Abnormal) Collected: 04/28/25 1442    Lab Status: Final result Specimen: Blood from Arm, Left Updated: 04/28/25 1514     Sodium 137 mmol/L      Potassium 3.4 mmol/L      Chloride 106 mmol/L      CO2 19 mmol/L      ANION GAP 12 mmol/L      BUN 9 mg/dL      Creatinine 0.58 mg/dL      Glucose 144 mg/dL      Calcium 9.5 mg/dL      AST 16 U/L      ALT 11 U/L      Alkaline Phosphatase 60 U/L      Total Protein 7.4 g/dL      Albumin 4.8 g/dL      Total Bilirubin 0.53 mg/dL      eGFR 129 ml/min/1.73sq m     Narrative:      National Kid[employer] guidelines for Chronic Kidney Disease (CKD):     Stage 1 with normal or high GFR (GFR > 90 mL/min/1.73 square meters)    Stage 2 Mild CKD (GFR = 60-89 mL/min/1.73 square meters)    Stage 3A Moderate CKD (GFR = 45-59 mL/min/1.73 square meters)    Stage 3B Moderate CKD (GFR = 30-44 mL/min/1.73 square meters)    Stage 4 Severe CKD (GFR = 15-29 mL/min/1.73 square meters)    Stage 5 End Stage CKD (GFR <15 mL/min/1.73 square meters)  Note: GFR calculation is accurate only with a steady state creatinine    Lipase [[id number]]  (Normal) Collected: 04/28/25 1442    Lab Status: Final result Specimen:  Blood from Arm, Left Updated: 04/28/25 1514     Lipase 12 u/L     CBC and differential [[id number]]  (Abnormal) Collected: 04/28/25 1442    Lab Status: Final result Specimen: Blood from Arm, Left Updated: 04/28/25 1451     WBC 12.58 Thousand/uL      RBC 4.75 Million/uL      Hemoglobin 13.3 g/dL      Hematocrit 41.5 %      MCV 87 fL      MCH 28.0 pg      MCHC 32.0 g/dL      RDW 13.1 %      MPV 10.4 fL      Platelets 269 Thousands/uL      nRBC 0 /100 WBCs      Segmented % 75 %      Immature Grans % 0 %      Lymphocytes % 18 %      Monocytes % 5 %      Eosinophils Relative 1 %      Basophils Relative 1 %      Absolute Neutrophils 9.42 Thousands/µL      Absolute Immature Grans 0.05 Thousand/uL      Absolute Lymphocytes 2.25 Thousands/µL      Absolute Monocytes 0.62 Thousand/µL      Eosinophils Absolute 0.17 Thousand/µL      Basophils Absolute 0.07 Thousands/µL             CT Abdomen pelvis with contrast   Final Interpretation by Rc Castro MD (04/28 1616)      No acute findings in the abdomen or pelvis.         Workstation performed: [id number]             ECG 12 Lead Documentation Only    Date/Time: 4/28/2025 7:48 PM    Performed by: Gregory Sal DO  Authorized by: Gregory Sal DO    ECG reviewed by me, the ED Provider: yes    Patient location:  ED  Comments:      Sinus bradycardia, rate of 48, normal MI, normal QTc, no STEMI, QTc is slightly prolonged compared to prior EKG dated December 6, 2019, EKG independently interpreted by me      ED Medication and Procedure Management   Prior to Admission Medications   Prescriptions Last Dose Informant Patient Reported? Taking?   hydrocortisone 2.5 % lotion   No No   Sig: Apply topically 2 (two) times a day for 14 days   naproxen (Naprosyn) 500 mg tablet   No No   Sig: Take 1 tablet (500 mg total) by mouth 2 (two) times a day with meals for 21 days   ondansetron (ZOFRAN-ODT) 4 mg disintegrating tablet   No No   Sig: Take 1 tablet (4 mg total) by mouth every 6 (six)  hours as needed for nausea or vomiting for up to 3 days      Facility-Administered Medications: None     Discharge Medication List as of 4/28/2025  5:09 PM        CONTINUE these medications which have NOT CHANGED    Details   hydrocortisone 2.5 % lotion Apply topically 2 (two) times a day for 14 days, Starting Thu 12/29/2022, Until Thu 1/12/2023, Normal      naproxen (Naprosyn) 500 mg tablet Take 1 tablet (500 mg total) by mouth 2 (two) times a day with meals for 21 days, Starting Sat 8/31/2024, Until Sat 9/21/2024, Normal      ondansetron (ZOFRAN-ODT) 4 mg disintegrating tablet Take 1 tablet (4 mg total) by mouth every 6 (six) hours as needed for nausea or vomiting for up to 3 days, Starting Sun 4/27/2025, Until Wed 4/30/2025 at 2359, Normal           No discharge procedures on file.  ED SEPSIS DOCUMENTATION   Time reflects when diagnosis was documented in both MDM as applicable and the Disposition within this note       Time User Action Codes Description Comment    4/28/2025  5:05 PM Gregory Sal [R11.2] Nausea and vomiting     4/28/2025  5:05 PM Gregory Sal [F12.188] Cannabis hyperemesis syndrome concurrent with and due to cannabis abuse ([facility])                  Gregory Sal,   04/28/25 1947       Gregory Sal DO  04/28/25 1948

## 2025-05-02 LAB
ATRIAL RATE: 48 BPM
P AXIS: 72 DEGREES
PR INTERVAL: 152 MS
QRS AXIS: 85 DEGREES
QRSD INTERVAL: 94 MS
QT INTERVAL: 514 MS
QTC INTERVAL: 459 MS
T WAVE AXIS: 35 DEGREES
VENTRICULAR RATE: 48 BPM

## 2025-05-02 PROCEDURE — 93010 ELECTROCARDIOGRAM REPORT: CPT | Performed by: INTERNAL MEDICINE

## 2025-05-05 ENCOUNTER — HOSPITAL ENCOUNTER (EMERGENCY)
Facility: HOSPITAL | Age: 25
Discharge: HOME | End: 2025-05-06
Attending: EMERGENCY MEDICINE
Payer: COMMERCIAL

## 2025-05-05 VITALS
HEART RATE: 69 BPM | DIASTOLIC BLOOD PRESSURE: 74 MMHG | RESPIRATION RATE: 16 BRPM | TEMPERATURE: 98.1 F | WEIGHT: 122.14 LBS | OXYGEN SATURATION: 98 % | SYSTOLIC BLOOD PRESSURE: 116 MMHG | BODY MASS INDEX: 20.32 KG/M2

## 2025-05-05 DIAGNOSIS — R11.2 NAUSEA & VOMITING: Primary | ICD-10-CM

## 2025-05-05 LAB
ALBUMIN SERPL BCG-MCNC: 4.3 G/DL (ref 3.5–5)
ALP SERPL-CCNC: 44 U/L (ref 34–104)
ALT SERPL W P-5'-P-CCNC: 31 U/L (ref 7–52)
AMPHETAMINES SERPL QL SCN: NEGATIVE
ANION GAP SERPL CALCULATED.3IONS-SCNC: 11 MMOL/L (ref 4–13)
AST SERPL W P-5'-P-CCNC: 18 U/L (ref 13–39)
BACTERIA UR QL AUTO: ABNORMAL /HPF
BARBITURATES UR QL: NEGATIVE
BASOPHILS # BLD AUTO: 0.04 THOUSANDS/ÂΜL (ref 0–0.1)
BASOPHILS NFR BLD AUTO: 0 % (ref 0–1)
BENZODIAZ UR QL: POSITIVE
BILIRUB SERPL-MCNC: 0.76 MG/DL (ref 0.2–1)
BILIRUB UR QL STRIP: NEGATIVE
BUN SERPL-MCNC: 8 MG/DL (ref 5–25)
CALCIUM SERPL-MCNC: 9.1 MG/DL (ref 8.4–10.2)
CHLORIDE SERPL-SCNC: 103 MMOL/L (ref 96–108)
CLARITY UR: CLEAR
CO2 SERPL-SCNC: 21 MMOL/L (ref 21–32)
COCAINE UR QL: NEGATIVE
COLOR UR: YELLOW
CREAT SERPL-MCNC: 0.43 MG/DL (ref 0.6–1.3)
EOSINOPHIL # BLD AUTO: 0.02 THOUSAND/ÂΜL (ref 0–0.61)
EOSINOPHIL NFR BLD AUTO: 0 % (ref 0–6)
ERYTHROCYTE [DISTWIDTH] IN BLOOD BY AUTOMATED COUNT: 12.8 % (ref 11.6–15.1)
EXT PREGNANCY TEST URINE: NEGATIVE
EXT. CONTROL: NORMAL
FENTANYL UR QL SCN: NEGATIVE
GFR SERPL CREATININE-BSD FRML MDRD: 142 ML/MIN/1.73SQ M
GLUCOSE SERPL-MCNC: 81 MG/DL (ref 65–140)
GLUCOSE UR STRIP-MCNC: NEGATIVE MG/DL
HCT VFR BLD AUTO: 40.6 % (ref 34.8–46.1)
HGB BLD-MCNC: 14 G/DL (ref 11.5–15.4)
HGB UR QL STRIP.AUTO: ABNORMAL
HYDROCODONE UR QL SCN: NEGATIVE
IMM GRANULOCYTES # BLD AUTO: 0.04 THOUSAND/UL (ref 0–0.2)
IMM GRANULOCYTES NFR BLD AUTO: 0 % (ref 0–2)
KETONES UR STRIP-MCNC: ABNORMAL MG/DL
LEUKOCYTE ESTERASE UR QL STRIP: NEGATIVE
LIPASE SERPL-CCNC: 18 U/L (ref 11–82)
LYMPHOCYTES # BLD AUTO: 2.53 THOUSANDS/ÂΜL (ref 0.6–4.47)
LYMPHOCYTES NFR BLD AUTO: 22 % (ref 14–44)
MCH RBC QN AUTO: 28.6 PG (ref 26.8–34.3)
MCHC RBC AUTO-ENTMCNC: 34.5 G/DL (ref 31.4–37.4)
MCV RBC AUTO: 83 FL (ref 82–98)
METHADONE UR QL: NEGATIVE
MONOCYTES # BLD AUTO: 1.02 THOUSAND/ÂΜL (ref 0.17–1.22)
MONOCYTES NFR BLD AUTO: 9 % (ref 4–12)
MUCOUS THREADS UR QL AUTO: ABNORMAL
NEUTROPHILS # BLD AUTO: 7.95 THOUSANDS/ÂΜL (ref 1.85–7.62)
NEUTS SEG NFR BLD AUTO: 69 % (ref 43–75)
NITRITE UR QL STRIP: NEGATIVE
NON-SQ EPI CELLS URNS QL MICRO: ABNORMAL /HPF
NRBC BLD AUTO-RTO: 0 /100 WBCS
OPIATES UR QL SCN: NEGATIVE
OXYCODONE+OXYMORPHONE UR QL SCN: NEGATIVE
PCP UR QL: NEGATIVE
PH UR STRIP.AUTO: 7 [PH] (ref 4.5–8)
PLATELET # BLD AUTO: 276 THOUSANDS/UL (ref 149–390)
PMV BLD AUTO: 10.3 FL (ref 8.9–12.7)
POTASSIUM SERPL-SCNC: 3.3 MMOL/L (ref 3.5–5.3)
PROT SERPL-MCNC: 7.2 G/DL (ref 6.4–8.4)
PROT UR STRIP-MCNC: NEGATIVE MG/DL
RBC # BLD AUTO: 4.9 MILLION/UL (ref 3.81–5.12)
RBC #/AREA URNS AUTO: ABNORMAL /HPF
SODIUM SERPL-SCNC: 135 MMOL/L (ref 135–147)
SP GR UR STRIP.AUTO: 1.02 (ref 1–1.03)
THC UR QL: POSITIVE
UROBILINOGEN UR QL STRIP.AUTO: >=8 E.U./DL
WBC # BLD AUTO: 11.6 THOUSAND/UL (ref 4.31–10.16)
WBC #/AREA URNS AUTO: ABNORMAL /HPF

## 2025-05-05 PROCEDURE — 96374 THER/PROPH/DIAG INJ IV PUSH: CPT

## 2025-05-05 PROCEDURE — 96376 TX/PRO/DX INJ SAME DRUG ADON: CPT

## 2025-05-05 PROCEDURE — 96372 THER/PROPH/DIAG INJ SC/IM: CPT

## 2025-05-05 PROCEDURE — 80053 COMPREHEN METABOLIC PANEL: CPT | Performed by: EMERGENCY MEDICINE

## 2025-05-05 PROCEDURE — 80307 DRUG TEST PRSMV CHEM ANLYZR: CPT | Performed by: EMERGENCY MEDICINE

## 2025-05-05 PROCEDURE — 81001 URINALYSIS AUTO W/SCOPE: CPT

## 2025-05-05 PROCEDURE — 36415 COLL VENOUS BLD VENIPUNCTURE: CPT | Performed by: EMERGENCY MEDICINE

## 2025-05-05 PROCEDURE — 85025 COMPLETE CBC W/AUTO DIFF WBC: CPT | Performed by: EMERGENCY MEDICINE

## 2025-05-05 PROCEDURE — 99283 EMERGENCY DEPT VISIT LOW MDM: CPT

## 2025-05-05 PROCEDURE — 83690 ASSAY OF LIPASE: CPT | Performed by: EMERGENCY MEDICINE

## 2025-05-05 PROCEDURE — 99284 EMERGENCY DEPT VISIT MOD MDM: CPT | Performed by: EMERGENCY MEDICINE

## 2025-05-05 PROCEDURE — 96361 HYDRATE IV INFUSION ADD-ON: CPT

## 2025-05-05 PROCEDURE — 81025 URINE PREGNANCY TEST: CPT | Performed by: EMERGENCY MEDICINE

## 2025-05-05 RX ORDER — POTASSIUM CHLORIDE 20MEQ/15ML
40 LIQUID (ML) ORAL ONCE
Status: COMPLETED | OUTPATIENT
Start: 2025-05-05 | End: 2025-05-05

## 2025-05-05 RX ORDER — HALOPERIDOL 5 MG/ML
5 INJECTION INTRAMUSCULAR ONCE
Status: COMPLETED | OUTPATIENT
Start: 2025-05-05 | End: 2025-05-05

## 2025-05-05 RX ORDER — ONDANSETRON 2 MG/ML
4 INJECTION INTRAMUSCULAR; INTRAVENOUS ONCE
Status: COMPLETED | OUTPATIENT
Start: 2025-05-05 | End: 2025-05-05

## 2025-05-05 RX ORDER — ONDANSETRON 4 MG/1
4 TABLET, ORALLY DISINTEGRATING ORAL EVERY 8 HOURS PRN
Qty: 20 TABLET | Refills: 0 | Status: ON HOLD | OUTPATIENT
Start: 2025-05-05

## 2025-05-05 RX ADMIN — POTASSIUM CHLORIDE 40 MEQ: 20 SOLUTION ORAL at 22:19

## 2025-05-05 RX ADMIN — SODIUM CHLORIDE 1000 ML: 0.9 INJECTION, SOLUTION INTRAVENOUS at 21:31

## 2025-05-05 RX ADMIN — HALOPERIDOL LACTATE 5 MG: 5 INJECTION, SOLUTION INTRAMUSCULAR at 21:32

## 2025-05-05 RX ADMIN — SODIUM CHLORIDE 1000 ML: 0.9 INJECTION, SOLUTION INTRAVENOUS at 22:25

## 2025-05-05 RX ADMIN — ONDANSETRON 4 MG: 2 INJECTION INTRAMUSCULAR; INTRAVENOUS at 22:32

## 2025-05-05 RX ADMIN — HALOPERIDOL LACTATE 5 MG: 5 INJECTION, SOLUTION INTRAMUSCULAR at 23:35

## 2025-05-05 RX ADMIN — ONDANSETRON 4 MG: 2 INJECTION INTRAMUSCULAR; INTRAVENOUS at 21:31

## 2025-05-05 NOTE — Clinical Note
Evonne Seth was seen and treated in our emergency department on 5/5/2025.    No restrictions            Diagnosis:     Evonne  .    She may return on this date: 05/08/2025         If you have any questions or concerns, please don't hesitate to call.      Nora Hills RN    ______________________________           _______________          _______________  Hospital Representative                              Date                                Time

## 2025-05-06 NOTE — ED PROVIDER NOTES
Pt Name: Evonne Seth  MRN: [id number]  Birthdate 2000  Age/Sex: 24 y.o. female  Date of evaluation: 5/5/2025  PCP: No primary care provider on file.        FINAL IMPRESSION    Final diagnoses:   Nausea & vomiting         DISPOSITION/PLAN    Time reflects when diagnosis was documented in both MDM as applicable and the Disposition within this note       Time User Action Codes Description Comment    5/5/2025 11:33 PM Babs Lyman Add [R11.2] Nausea & vomiting           ED Disposition       ED Disposition   Discharge    Condition   Stable    Date/Time   Mon May 5, 2025 11:33 PM    Comment   Evonne Seth discharge to home/self care.                   Follow-up Information       Follow up With Specialties Details Why Contact Info Additional Information    [facility] Emergency Department Emergency Medicine Go to  As needed, If symptoms worsen [address] [place] [id number]  [phone] [facility] Emergency Department, [address] [place] [address], [phone]              PATIENT REFERRED TO:    [facility] Emergency Department  [address] [place] [id number]  [phone]  Go to   As needed, If symptoms worsen      DISCHARGE MEDICATIONS:    Discharge Medication List as of 5/5/2025 11:36 PM        CONTINUE these medications which have CHANGED    Details   ondansetron (ZOFRAN-ODT) 4 mg disintegrating tablet Take 1 tablet (4 mg total) by mouth every 8 (eight) hours as needed for vomiting or nausea, Starting Mon 5/5/2025, Normal           CONTINUE these medications which have NOT CHANGED    Details   hydrocortisone 2.5 % lotion Apply topically 2 (two) times a day for 14 days, Starting Thu 12/29/2022, Until Thu 1/12/2023, Normal      naproxen (Naprosyn) 500 mg tablet Take 1 tablet (500 mg total) by mouth 2 (two) times a day with meals for 21 days, Starting Sat 8/31/2024, Until Sat 9/21/2024, Normal             No discharge  procedures on file.          CHIEF COMPLAINT    Chief Complaint   Patient presents with    Vomiting     Reports recent hospitalization for vomiting, discharged earlier today. Endoscope scheduled for next week, GI doctor instructed her to go back to ED if vomiting continued.          HPI    Evonne presents to the Emergency Department complaining of nausea and vomiting.  She has a long history of similar and has been seen on multiple ER visits and by GI.  She tells me that she has quit cannabis and this seems to have made things worse.   No diarrhea.  No fever.  She is scheduled for upcoming endoscopy.        HPI      Past Medical and Surgical History    Past Medical History:   Diagnosis Date    Asthma        History reviewed. No pertinent surgical history.    History reviewed. No pertinent family history.    Social History     Tobacco Use    Smoking status: Former     Current packs/day: 0.25     Average packs/day: 0.3 packs/day for 2.4 years (0.6 ttl pk-yrs)     Types: Cigarettes     Start date: 12/29/2022    Smokeless tobacco: Never   Vaping Use    Vaping status: Never Used   Substance Use Topics    Alcohol use: Yes     Comment: socially    Drug use: Yes     Frequency: 1.0 times per week     Types: Marijuana     Comment: last used 4/28         .    Allergies    No Known Allergies    Home Medications    Prior to Admission medications    Medication Sig Start Date End Date Taking? Authorizing Provider   hydrocortisone 2.5 % lotion Apply topically 2 (two) times a day for 14 days 12/29/22 1/12/23  Mandy Esposito MD   naproxen (Naprosyn) 500 mg tablet Take 1 tablet (500 mg total) by mouth 2 (two) times a day with meals for 21 days 8/31/24 9/21/24  Missy Mckeon DO   ondansetron (ZOFRAN-ODT) 4 mg disintegrating tablet Take 1 tablet (4 mg total) by mouth every 6 (six) hours as needed for nausea or vomiting for up to 3 days 4/27/25 4/30/25  Pedrito Shell PA-C           Review of Systems    Review of Systems    Constitutional:  Negative for chills and fever.   HENT:  Negative for ear pain and sore throat.    Eyes:  Negative for pain and visual disturbance.   Respiratory:  Negative for cough and shortness of breath.    Cardiovascular:  Negative for chest pain and palpitations.   Gastrointestinal:  Positive for nausea and vomiting. Negative for abdominal pain.   Genitourinary:  Negative for dysuria and hematuria.   Musculoskeletal:  Negative for arthralgias and back pain.   Skin:  Negative for color change and rash.   Neurological:  Negative for seizures and syncope.   All other systems reviewed and are negative.          Physical Exam      ED Triage Vitals [05/05/25 2026]   Temperature Pulse Respirations Blood Pressure SpO2   98.1 °F (36.7 °C) 83 16 129/80 100 %      Temp Source Heart Rate Source Patient Position - Orthostatic VS BP Location FiO2 (%)   Oral Monitor Sitting Right arm --      Pain Score       7               Physical Exam  Vitals and nursing note reviewed.   Constitutional:       General: She is not in acute distress.     Appearance: She is well-developed.   HENT:      Head: Normocephalic and atraumatic.   Eyes:      Conjunctiva/sclera: Conjunctivae normal.   Cardiovascular:      Rate and Rhythm: Normal rate and regular rhythm.      Heart sounds: No murmur heard.  Pulmonary:      Effort: Pulmonary effort is normal. No respiratory distress.      Breath sounds: Normal breath sounds.   Abdominal:      Palpations: Abdomen is soft.      Tenderness: There is no abdominal tenderness.   Musculoskeletal:         General: No swelling.      Cervical back: Neck supple.   Skin:     General: Skin is warm and dry.      Capillary Refill: Capillary refill takes less than 2 seconds.   Neurological:      Mental Status: She is alert.   Psychiatric:         Mood and Affect: Mood normal.                Assessment and Plan    Evonne Seth is a 24 y.o. female who presents with nausea and vomiting. Physical examination  remarkable for mild clinical dehydration. Differential diagnosis (not completely inclusive) includes hyperemesis/ viral/ other. Plan will be to perform diagnostic testing and treat symptomatically.      MDM      Diagnostic Results      Labs:    Results for orders placed or performed during the hospital encounter of 05/05/25   CBC and differential   Result Value Ref Range    WBC 11.60 (H) 4.31 - 10.16 Thousand/uL    RBC 4.90 3.81 - 5.12 Million/uL    Hemoglobin 14.0 11.5 - 15.4 g/dL    Hematocrit 40.6 34.8 - 46.1 %    MCV 83 82 - 98 fL    MCH 28.6 26.8 - 34.3 pg    MCHC 34.5 31.4 - 37.4 g/dL    RDW 12.8 11.6 - 15.1 %    MPV 10.3 8.9 - 12.7 fL    Platelets 276 149 - 390 Thousands/uL    nRBC 0 /100 WBCs    Segmented % 69 43 - 75 %    Immature Grans % 0 0 - 2 %    Lymphocytes % 22 14 - 44 %    Monocytes % 9 4 - 12 %    Eosinophils Relative 0 0 - 6 %    Basophils Relative 0 0 - 1 %    Absolute Neutrophils 7.95 (H) 1.85 - 7.62 Thousands/µL    Absolute Immature Grans 0.04 0.00 - 0.20 Thousand/uL    Absolute Lymphocytes 2.53 0.60 - 4.47 Thousands/µL    Absolute Monocytes 1.02 0.17 - 1.22 Thousand/µL    Eosinophils Absolute 0.02 0.00 - 0.61 Thousand/µL    Basophils Absolute 0.04 0.00 - 0.10 Thousands/µL   Comprehensive metabolic panel   Result Value Ref Range    Sodium 135 135 - 147 mmol/L    Potassium 3.3 (L) 3.5 - 5.3 mmol/L    Chloride 103 96 - 108 mmol/L    CO2 21 21 - 32 mmol/L    ANION GAP 11 4 - 13 mmol/L    BUN 8 5 - 25 mg/dL    Creatinine 0.43 (L) 0.60 - 1.30 mg/dL    Glucose 81 65 - 140 mg/dL    Calcium 9.1 8.4 - 10.2 mg/dL    AST 18 13 - 39 U/L    ALT 31 7 - 52 U/L    Alkaline Phosphatase 44 34 - 104 U/L    Total Protein 7.2 6.4 - 8.4 g/dL    Albumin 4.3 3.5 - 5.0 g/dL    Total Bilirubin 0.76 0.20 - 1.00 mg/dL    eGFR 142 ml/min/1.73sq m   Lipase   Result Value Ref Range    Lipase 18 11 - 82 u/L   Rapid drug screen, urine   Result Value Ref Range    Amph/Meth UR Negative Negative    Barbiturate Ur Negative  Negative    Benzodiazepine Urine Positive (A) Negative    Cocaine Urine Negative Negative    Methadone Urine Negative Negative    Opiate Urine Negative Negative    PCP Ur Negative Negative    THC Urine Positive (A) Negative    Oxycodone Urine Negative Negative    Fentanyl Urine Negative Negative    HYDROCODONE URINE Negative Negative   Urine Microscopic   Result Value Ref Range    RBC, UA 2-4 (A) None Seen, 1-2 /hpf    WBC, UA 1-2 None Seen, 1-2 /hpf    Epithelial Cells Occasional None Seen, Occasional /hpf    Bacteria, UA Occasional None Seen, Occasional /hpf    MUCUS THREADS Moderate (A) None Seen   POCT pregnancy, urine   Result Value Ref Range    EXT Preg Test, Ur Negative     Control Valid    Urine Macroscopic, POC   Result Value Ref Range    Color, UA Yellow     Clarity, UA Clear     pH, UA 7.0 4.5 - 8.0    Leukocytes, UA Negative Negative    Nitrite, UA Negative Negative    Protein, UA Negative Negative mg/dl    Glucose, UA Negative Negative mg/dl    Ketones, UA >=160 (4+) (A) Negative mg/dl    Urobilinogen, UA >=8.0 (A) 0.2, 1.0 E.U./dl E.U./dl    Bilirubin, UA Negative Negative    Occult Blood, UA Trace (A) Negative    Specific Gravity, UA 1.020 1.003 - 1.030       All labs reviewed and utilized in the medical decision making process    Radiology:    No orders to display       All radiology studies independently viewed by me and interpreted by the radiologist.    Procedure    Procedures      ED Course of Care and Re-Assessments    Patient was treated with 2 liters of IVF and several rounds of antiemetics.    She was not feeling completely better at the time of discharge but she had no episodes of active vomiting in the ER.      Medications   ondansetron (ZOFRAN) injection 4 mg (4 mg Intravenous Given 5/5/25 2131)   sodium chloride 0.9 % bolus 1,000 mL (0 mL Intravenous Stopped 5/5/25 2226)   haloperidol lactate (HALDOL) injection 5 mg (5 mg Intramuscular Given 5/5/25 2132)   sodium chloride 0.9 % bolus  1,000 mL (0 mL Intravenous Stopped 5/5/25 2317)   potassium chloride oral solution 40 mEq (40 mEq Oral Given 5/5/25 2219)   ondansetron (ZOFRAN) injection 4 mg (4 mg Intravenous Given 5/5/25 2232)   haloperidol lactate (HALDOL) injection 5 mg (5 mg Intramuscular Given 5/5/25 2335)                  DO Babs Meyers DO  05/07/25 0034

## 2025-05-18 ENCOUNTER — HOSPITAL ENCOUNTER (EMERGENCY)
Facility: HOSPITAL | Age: 25
Discharge: HOME | End: 2025-05-19
Attending: EMERGENCY MEDICINE
Payer: COMMERCIAL

## 2025-05-18 ENCOUNTER — HOSPITAL ENCOUNTER (EMERGENCY)
Facility: HOSPITAL | Age: 25
Discharge: HOME | End: 2025-05-18
Attending: EMERGENCY MEDICINE
Payer: COMMERCIAL

## 2025-05-18 VITALS
SYSTOLIC BLOOD PRESSURE: 140 MMHG | RESPIRATION RATE: 16 BRPM | OXYGEN SATURATION: 100 % | DIASTOLIC BLOOD PRESSURE: 85 MMHG | TEMPERATURE: 98.6 F | HEART RATE: 90 BPM

## 2025-05-18 VITALS
OXYGEN SATURATION: 100 % | HEART RATE: 89 BPM | TEMPERATURE: 98.9 F | DIASTOLIC BLOOD PRESSURE: 83 MMHG | SYSTOLIC BLOOD PRESSURE: 125 MMHG | RESPIRATION RATE: 20 BRPM

## 2025-05-18 DIAGNOSIS — F32.A DEPRESSION: Primary | ICD-10-CM

## 2025-05-18 DIAGNOSIS — R45.851 SUICIDAL IDEATION: Primary | ICD-10-CM

## 2025-05-18 DIAGNOSIS — F41.9 ANXIETY: ICD-10-CM

## 2025-05-18 LAB
AMPHETAMINES SERPL QL SCN: NEGATIVE
BARBITURATES UR QL: NEGATIVE
BENZODIAZ UR QL: NEGATIVE
COCAINE UR QL: NEGATIVE
ETHANOL EXG-MCNC: 0 MG/DL
EXT PREGNANCY TEST URINE: NEGATIVE
EXT. CONTROL: NORMAL
FENTANYL UR QL SCN: NEGATIVE
HYDROCODONE UR QL SCN: NEGATIVE
METHADONE UR QL: NEGATIVE
OPIATES UR QL SCN: NEGATIVE
OXYCODONE+OXYMORPHONE UR QL SCN: NEGATIVE
PCP UR QL: NEGATIVE
THC UR QL: POSITIVE

## 2025-05-18 PROCEDURE — 99204 OFFICE O/P NEW MOD 45 MIN: CPT | Performed by: GENERAL PRACTICE

## 2025-05-18 PROCEDURE — 81025 URINE PREGNANCY TEST: CPT | Performed by: EMERGENCY MEDICINE

## 2025-05-18 PROCEDURE — 99283 EMERGENCY DEPT VISIT LOW MDM: CPT

## 2025-05-18 PROCEDURE — 99283 EMERGENCY DEPT VISIT LOW MDM: CPT | Performed by: EMERGENCY MEDICINE

## 2025-05-18 PROCEDURE — 99285 EMERGENCY DEPT VISIT HI MDM: CPT

## 2025-05-18 PROCEDURE — 99285 EMERGENCY DEPT VISIT HI MDM: CPT | Performed by: EMERGENCY MEDICINE

## 2025-05-18 PROCEDURE — 82075 ASSAY OF BREATH ETHANOL: CPT | Performed by: EMERGENCY MEDICINE

## 2025-05-18 PROCEDURE — 80307 DRUG TEST PRSMV CHEM ANLYZR: CPT | Performed by: EMERGENCY MEDICINE

## 2025-05-18 RX ORDER — HALOPERIDOL 1 MG/1
2 TABLET ORAL ONCE
Status: COMPLETED | OUTPATIENT
Start: 2025-05-18 | End: 2025-05-18

## 2025-05-18 RX ORDER — LORAZEPAM 1 MG/1
1 TABLET ORAL ONCE
Status: COMPLETED | OUTPATIENT
Start: 2025-05-18 | End: 2025-05-18

## 2025-05-18 RX ADMIN — HALOPERIDOL 2 MG: 1 TABLET ORAL at 20:35

## 2025-05-18 RX ADMIN — LORAZEPAM 1 MG: 1 TABLET ORAL at 20:35
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
## 2025-05-18 NOTE — Clinical Note
Evonne Seth was seen and treated in our emergency department on 5/18/2025.                Diagnosis:     Evonne  may return to work on return date.    She may return on this date: 05/20/2025         If you have any questions or concerns, please don't hesitate to call.      Ashley Craig MD    ______________________________           _______________          _______________  Hospital Representative                              Date                                Time

## 2025-05-18 NOTE — Clinical Note
vEonne Seth should be transferred out to behavioral [facility], and has been medically cleared.
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
## 2025-05-19 ENCOUNTER — HOSPITAL ENCOUNTER (EMERGENCY)
Facility: HOSPITAL | Age: 25
End: 2025-05-20
Attending: EMERGENCY MEDICINE
Payer: COMMERCIAL

## 2025-05-19 VITALS
DIASTOLIC BLOOD PRESSURE: 72 MMHG | RESPIRATION RATE: 17 BRPM | SYSTOLIC BLOOD PRESSURE: 117 MMHG | TEMPERATURE: 98.4 F | HEART RATE: 98 BPM | OXYGEN SATURATION: 98 %

## 2025-05-19 DIAGNOSIS — R45.851 SUICIDAL IDEATION: ICD-10-CM

## 2025-05-19 DIAGNOSIS — F29 PSYCHOSIS (HCC): Primary | ICD-10-CM

## 2025-05-19 DIAGNOSIS — Z00.8 MEDICAL CLEARANCE FOR PSYCHIATRIC ADMISSION: ICD-10-CM

## 2025-05-19 LAB — ETHANOL EXG-MCNC: 0 MG/DL

## 2025-05-19 PROCEDURE — 81025 URINE PREGNANCY TEST: CPT | Performed by: EMERGENCY MEDICINE

## 2025-05-19 PROCEDURE — 96372 THER/PROPH/DIAG INJ SC/IM: CPT

## 2025-05-19 PROCEDURE — 99283 EMERGENCY DEPT VISIT LOW MDM: CPT

## 2025-05-19 PROCEDURE — 82075 ASSAY OF BREATH ETHANOL: CPT | Performed by: EMERGENCY MEDICINE

## 2025-05-19 PROCEDURE — 99285 EMERGENCY DEPT VISIT HI MDM: CPT | Performed by: EMERGENCY MEDICINE

## 2025-05-19 RX ORDER — WATER 10 ML/10ML
INJECTION INTRAMUSCULAR; INTRAVENOUS; SUBCUTANEOUS
Status: COMPLETED
Start: 2025-05-19 | End: 2025-05-19

## 2025-05-19 RX ORDER — LORAZEPAM 2 MG/ML
2 INJECTION INTRAMUSCULAR ONCE
Status: COMPLETED | OUTPATIENT
Start: 2025-05-19 | End: 2025-05-19

## 2025-05-19 RX ORDER — OLANZAPINE 10 MG/2ML
10 INJECTION, POWDER, FOR SOLUTION INTRAMUSCULAR ONCE
Status: COMPLETED | OUTPATIENT
Start: 2025-05-19 | End: 2025-05-19

## 2025-05-19 RX ORDER — OLANZAPINE 10 MG/2ML
INJECTION, POWDER, FOR SOLUTION INTRAMUSCULAR
Status: COMPLETED
Start: 2025-05-19 | End: 2025-05-19

## 2025-05-19 RX ADMIN — OLANZAPINE 10 MG: 10 INJECTION, POWDER, FOR SOLUTION INTRAMUSCULAR at 22:19

## 2025-05-19 RX ADMIN — LORAZEPAM 2 MG: 2 INJECTION INTRAMUSCULAR; INTRAVENOUS at 22:19

## 2025-05-19 NOTE — DISCHARGE INSTRUCTIONS
Patient being discharged at this time with referrals and/or information about hotline / warm line numbers; walk-in clinic information; local outpatient resource list for therapists / counselors, psychologists, psychiatrists, and partial programs; and online / internet resources and support groups.  Advised to utilize natural and existing supports. Advised for safety planning and effective coping skills.  In addition, the patient was instructed to call [facility] crisis, other crisis services, 911 or to go to the nearest ER immediately if their situation changes at any time.      [facility] Crisis Number: [place] [phone].     National Suicide/Crisis Lifeline: Dial/text or chat 738  and you be be connected to a trained counseler.      [facility] PEER/WARM Line 24 Hours Every Day: [phone]     This writer discussed discharge plans with the patient,  who agrees with and understands the discharge plans.      SAFETY PLAN:  Warning Signs (thoughts, images, mood, behavior, situations) of a potential crisis: Mood lability, intrusive thoughts of harming self.      Coping Skills (what can I do to take my mind off the problem, or to keep myself safe:  Music.  Take a break/rest/go for a walk.  Adequate rest.  Deep breathing techniques.     Outside Support (who can I reach out to for support and help: See list of outpatient resources.  [facility] intake and referral [phone].  DEPRESSION TIPS:     Shower. Not a bath, a shower. Use water as hot or cold as you like. You don't even need to wash. Just get in under the water and let it run over you for a while. Sit on the floor if you need to.     Moisturize everything. Use whatever lotion you like. Use whatever you want, and use it all over your entire dermis.      Put on clean, comfortable clothes.      Drink cold water. Use ice. If you want, add some mint or lemon for an extra boost.      Clean something. Doesn't have to be anything big.  Organize one drawer of a desk. Wash five dirty dishes. Do a load of laundry. Scrub the bathroom sink.      Blast music. Listen to something upbeat, something that's got lots of energy. Sing to it, dance to it.     Make food. Don't just grab something to munch on. Take the time and make food. Even if it's simple. Prepare food, it tastes way better, and you'll feel like you accomplished something.      Make something. Write a short story or a poem, draw a picture, color a picture, fold origami, avi or knit, sculpt something out of amena, anything artistic. Even if you don't think you're good at it. Create.      Go outside. Take a walk. Sit in the grass. Look at the clouds. Smell flowers. Put your hands in the dirt and feel the soil against your skin.     Call someone. Call a loved one, a friend, a family member, call a chat service if you have no one else to call. Talk to a stranger on the street. Have a conversation and listen to someone's voice. If you can't bring yourself to call, text or email or whatever, just have some social interaction with another person. Even if you don't say much, listen to them. It helps.      Cuddle your pets if you have them/can cuddle them. Take pictures of them. Talk to them. Tell them how you feel, about your favorite movie, a new game coming out, anything.      May seem small or silly to some, but this list keeps people alive.       Find something to be grateful for!

## 2025-05-19 NOTE — ED NOTES
"While providing medications per MAR to patient, pt kept making statements such as \"Oh look at your skin. You're white. You probably never had a bad day in your life\". Patient asked what medications were being given to her and while this RN was trying name each of the medications, pt started rambling to her mom. This RN tried to tell patient what the medications were, but unfortunately the patient was distracted. This RN was able to tell the patient, \"These are the medications the doctor has ordered for you. This will help calm you down. They are Haldol and Ativan\". Pt continued to make racial comments.      Raul Ramos RN  05/18/25 2302    "
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
## 2025-05-19 NOTE — ED NOTES
Provider Alex (crisis) now at bedside to speak with patient     Rachelle Mata, RN  05/19/25 [id number]

## 2025-05-19 NOTE — ED NOTES
Received patient sitting on stretcher with son's father. Patient was calm/cooperative/open for conversation denying current SI/HI.      Rohini RIZZO RN  05/19/25 [id number]

## 2025-05-19 NOTE — ED NOTES
Chart reviewed.  Patient signed 201.  S-L-Intake as a referral.  Bed availability TBD.  CIS to follow-up.

## 2025-05-19 NOTE — ED NOTES
Met with patient with EDMD. Patient requesting to be discharged.  Anxious, labile, irritable, argumentative at times, in behavioral control. Denies current suicidal/homicidal ideation, intent or plan.  States that she signed a 201 because mother wanted her to.  Does not want inpatient at this time.  Identifies her job and child as a protective factors.  States that she needs to go to the court house for custody. States that she was referred to a therapy provider but has not followed up with them.  Is motivated to follow-up outpatient.  Completed safety plan with the patient.  See below.     This writer discussed the patient's current presentation and recommended discharge plan with Dr. Craig. They agree with the patient being discharged at this time with referrals and/or information about hotline / warm line numbers; walk-in clinic information; local outpatient resource list for therapists / counselors, psychologists, psychiatrists, and partial programs; and online / internet resources and support groups.  Advised to utilize natural and existing supports. Advised for safety planning and effective coping skills.  In addition, the patient was instructed to call [facility] crisis, other crisis services, 911 or to go to the nearest ER immediately if their situation changes at any time.      [facility] Crisis Number: [place] [phone].    National Suicide/Crisis Lifeline: Dial/text or chat 669 ([employer] [facility]), and you be be connected to a trained counseler.     [facility] PEER/WARM Line 24 Hours Every Day: [phone]    This writer discussed discharge plans with the patient,  who agrees with and understands the discharge plans.     SAFETY PLAN:  Warning Signs (thoughts, images, mood, behavior, situations) of a potential crisis: Mood lability, intrusive thoughts of harming self.      Coping Skills (what can I do to take my mind off the problem, or to keep myself safe:  Music.  Take a  break/rest/go for a walk.  Adequate rest.  Deep breathing techniques.     Outside Support (who can I reach out to for support and help: See list of outpatient resources.  [facility] intake and referral [phone].
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
## 2025-05-19 NOTE — ED NOTES
Patient aware, agitated asking why she is still in the hospital. Asking how she can sign herself out since we have not found her a bed. Tried to explain to patient that bed search takes some time but will crisis know.      Rachelle Mata RN  05/19/25 5938

## 2025-05-20 ENCOUNTER — HOSPITAL ENCOUNTER (INPATIENT)
Facility: HOSPITAL | Age: 25
LOS: 10 days | Discharge: HOME | DRG: 885 | End: 2025-05-30
Attending: STUDENT IN AN ORGANIZED HEALTH CARE EDUCATION/TRAINING PROGRAM | Admitting: STUDENT IN AN ORGANIZED HEALTH CARE EDUCATION/TRAINING PROGRAM
Payer: COMMERCIAL

## 2025-05-20 DIAGNOSIS — Z00.8 MEDICAL CLEARANCE FOR PSYCHIATRIC ADMISSION: ICD-10-CM

## 2025-05-20 DIAGNOSIS — F31.2 BIPOLAR AFFECTIVE DISORDER, CURRENTLY MANIC, SEVERE, WITH PSYCHOTIC FEATURES (HCC): Primary | Chronic | ICD-10-CM

## 2025-05-20 LAB
AMPHETAMINES SERPL QL SCN: NEGATIVE
BARBITURATES UR QL: NEGATIVE
BENZODIAZ UR QL: NEGATIVE
COCAINE UR QL: NEGATIVE
EXT PREGNANCY TEST URINE: NEGATIVE
EXT. CONTROL: NORMAL
FENTANYL UR QL SCN: NEGATIVE
HYDROCODONE UR QL SCN: NEGATIVE
METHADONE UR QL: NEGATIVE
OPIATES UR QL SCN: NEGATIVE
OXYCODONE+OXYMORPHONE UR QL SCN: NEGATIVE
PCP UR QL: NEGATIVE
THC UR QL: POSITIVE

## 2025-05-20 PROCEDURE — 80307 DRUG TEST PRSMV CHEM ANLYZR: CPT | Performed by: EMERGENCY MEDICINE

## 2025-05-20 RX ORDER — HYDROXYZINE HYDROCHLORIDE 25 MG/1
100 TABLET, FILM COATED ORAL
Status: CANCELLED | OUTPATIENT
Start: 2025-05-20

## 2025-05-20 RX ORDER — HALOPERIDOL 1 MG/1
2 TABLET ORAL
Status: CANCELLED | OUTPATIENT
Start: 2025-05-20

## 2025-05-20 RX ORDER — DIPHENHYDRAMINE HYDROCHLORIDE 50 MG/ML
50 INJECTION, SOLUTION INTRAMUSCULAR; INTRAVENOUS EVERY 6 HOURS PRN
Status: CANCELLED | OUTPATIENT
Start: 2025-05-20

## 2025-05-20 RX ORDER — HALOPERIDOL 5 MG/ML
2.5 INJECTION INTRAMUSCULAR
Status: CANCELLED | OUTPATIENT
Start: 2025-05-20

## 2025-05-20 RX ORDER — AMOXICILLIN 250 MG
1 CAPSULE ORAL DAILY PRN
Status: CANCELLED | OUTPATIENT
Start: 2025-05-20

## 2025-05-20 RX ORDER — BENZTROPINE MESYLATE 1 MG/ML
0.5 INJECTION, SOLUTION INTRAMUSCULAR; INTRAVENOUS
Status: DISCONTINUED | OUTPATIENT
Start: 2025-05-20 | End: 2025-05-30 | Stop reason: HOSPADM

## 2025-05-20 RX ORDER — LORAZEPAM 2 MG/ML
2 INJECTION INTRAMUSCULAR
Status: CANCELLED | OUTPATIENT
Start: 2025-05-20

## 2025-05-20 RX ORDER — AMOXICILLIN 250 MG
1 CAPSULE ORAL DAILY PRN
Status: DISCONTINUED | OUTPATIENT
Start: 2025-05-20 | End: 2025-05-30 | Stop reason: HOSPADM

## 2025-05-20 RX ORDER — HALOPERIDOL 2 MG/1
2 TABLET ORAL
Status: DISCONTINUED | OUTPATIENT
Start: 2025-05-20 | End: 2025-05-30 | Stop reason: HOSPADM

## 2025-05-20 RX ORDER — BENZTROPINE MESYLATE 1 MG/ML
0.5 INJECTION, SOLUTION INTRAMUSCULAR; INTRAVENOUS
Status: CANCELLED | OUTPATIENT
Start: 2025-05-20

## 2025-05-20 RX ORDER — ACETAMINOPHEN 325 MG/1
975 TABLET ORAL EVERY 6 HOURS PRN
Status: DISCONTINUED | OUTPATIENT
Start: 2025-05-20 | End: 2025-05-30 | Stop reason: HOSPADM

## 2025-05-20 RX ORDER — LORAZEPAM 2 MG/ML
2 INJECTION INTRAMUSCULAR
Status: DISCONTINUED | OUTPATIENT
Start: 2025-05-20 | End: 2025-05-30 | Stop reason: HOSPADM

## 2025-05-20 RX ORDER — BENZTROPINE MESYLATE 1 MG/ML
1 INJECTION, SOLUTION INTRAMUSCULAR; INTRAVENOUS
Status: DISCONTINUED | OUTPATIENT
Start: 2025-05-20 | End: 2025-05-30 | Stop reason: HOSPADM

## 2025-05-20 RX ORDER — LORAZEPAM 2 MG/ML
2 INJECTION INTRAMUSCULAR EVERY 6 HOURS PRN
Status: DISCONTINUED | OUTPATIENT
Start: 2025-05-20 | End: 2025-05-30 | Stop reason: HOSPADM

## 2025-05-20 RX ORDER — MINERAL OIL AND PETROLATUM 150; 830 MG/G; MG/G
OINTMENT OPHTHALMIC 4 TIMES DAILY PRN
Status: DISCONTINUED | OUTPATIENT
Start: 2025-05-20 | End: 2025-05-30 | Stop reason: HOSPADM

## 2025-05-20 RX ORDER — HYDROXYZINE HYDROCHLORIDE 25 MG/1
25 TABLET, FILM COATED ORAL
Status: CANCELLED | OUTPATIENT
Start: 2025-05-20

## 2025-05-20 RX ORDER — BENZTROPINE MESYLATE 1 MG/1
1 TABLET ORAL 2 TIMES DAILY PRN
Status: CANCELLED | OUTPATIENT
Start: 2025-05-20

## 2025-05-20 RX ORDER — MINERAL OIL AND PETROLATUM 150; 830 MG/G; MG/G
OINTMENT OPHTHALMIC 4 TIMES DAILY PRN
Status: CANCELLED | OUTPATIENT
Start: 2025-05-20

## 2025-05-20 RX ORDER — ACETAMINOPHEN 325 MG/1
650 TABLET ORAL EVERY 4 HOURS PRN
Status: CANCELLED | OUTPATIENT
Start: 2025-05-20

## 2025-05-20 RX ORDER — HYDROXYZINE HYDROCHLORIDE 25 MG/1
50 TABLET, FILM COATED ORAL
Status: CANCELLED | OUTPATIENT
Start: 2025-05-20

## 2025-05-20 RX ORDER — HALOPERIDOL 5 MG/1
5 TABLET ORAL
Status: CANCELLED | OUTPATIENT
Start: 2025-05-20

## 2025-05-20 RX ORDER — LORAZEPAM 2 MG/ML
1 INJECTION INTRAMUSCULAR
Status: CANCELLED | OUTPATIENT
Start: 2025-05-20

## 2025-05-20 RX ORDER — BENZTROPINE MESYLATE 1 MG/1
1 TABLET ORAL 2 TIMES DAILY PRN
Status: DISCONTINUED | OUTPATIENT
Start: 2025-05-20 | End: 2025-05-30 | Stop reason: HOSPADM

## 2025-05-20 RX ORDER — PROPRANOLOL HYDROCHLORIDE 10 MG/1
10 TABLET ORAL EVERY 8 HOURS PRN
Status: DISCONTINUED | OUTPATIENT
Start: 2025-05-20 | End: 2025-05-30 | Stop reason: HOSPADM

## 2025-05-20 RX ORDER — HALOPERIDOL 5 MG/ML
2.5 INJECTION INTRAMUSCULAR
Status: DISCONTINUED | OUTPATIENT
Start: 2025-05-20 | End: 2025-05-30 | Stop reason: HOSPADM

## 2025-05-20 RX ORDER — ACETAMINOPHEN 325 MG/1
975 TABLET ORAL EVERY 6 HOURS PRN
Status: CANCELLED | OUTPATIENT
Start: 2025-05-20

## 2025-05-20 RX ORDER — TRAZODONE HYDROCHLORIDE 50 MG/1
50 TABLET ORAL
Status: CANCELLED | OUTPATIENT
Start: 2025-05-20

## 2025-05-20 RX ORDER — BENZTROPINE MESYLATE 1 MG/ML
1 INJECTION, SOLUTION INTRAMUSCULAR; INTRAVENOUS 2 TIMES DAILY PRN
Status: DISCONTINUED | OUTPATIENT
Start: 2025-05-20 | End: 2025-05-30 | Stop reason: HOSPADM

## 2025-05-20 RX ORDER — DIPHENHYDRAMINE HYDROCHLORIDE 50 MG/ML
50 INJECTION, SOLUTION INTRAMUSCULAR; INTRAVENOUS EVERY 6 HOURS PRN
Status: DISCONTINUED | OUTPATIENT
Start: 2025-05-20 | End: 2025-05-30 | Stop reason: HOSPADM

## 2025-05-20 RX ORDER — LORAZEPAM 2 MG/ML
2 INJECTION INTRAMUSCULAR EVERY 6 HOURS PRN
Status: CANCELLED | OUTPATIENT
Start: 2025-05-20

## 2025-05-20 RX ORDER — MAGNESIUM HYDROXIDE/ALUMINUM HYDROXICE/SIMETHICONE 120; 1200; 1200 MG/30ML; MG/30ML; MG/30ML
30 SUSPENSION ORAL EVERY 4 HOURS PRN
Status: DISCONTINUED | OUTPATIENT
Start: 2025-05-20 | End: 2025-05-30 | Stop reason: HOSPADM

## 2025-05-20 RX ORDER — MAGNESIUM HYDROXIDE/ALUMINUM HYDROXICE/SIMETHICONE 120; 1200; 1200 MG/30ML; MG/30ML; MG/30ML
30 SUSPENSION ORAL EVERY 4 HOURS PRN
Status: CANCELLED | OUTPATIENT
Start: 2025-05-20

## 2025-05-20 RX ORDER — LORAZEPAM 2 MG/ML
1 INJECTION INTRAMUSCULAR
Status: DISCONTINUED | OUTPATIENT
Start: 2025-05-20 | End: 2025-05-30 | Stop reason: HOSPADM

## 2025-05-20 RX ORDER — HYDROXYZINE HYDROCHLORIDE 25 MG/1
25 TABLET, FILM COATED ORAL
Status: DISCONTINUED | OUTPATIENT
Start: 2025-05-20 | End: 2025-05-30 | Stop reason: HOSPADM

## 2025-05-20 RX ORDER — ACETAMINOPHEN 325 MG/1
650 TABLET ORAL EVERY 4 HOURS PRN
Status: DISCONTINUED | OUTPATIENT
Start: 2025-05-20 | End: 2025-05-30 | Stop reason: HOSPADM

## 2025-05-20 RX ORDER — HALOPERIDOL 5 MG/ML
5 INJECTION INTRAMUSCULAR
Status: DISCONTINUED | OUTPATIENT
Start: 2025-05-20 | End: 2025-05-30 | Stop reason: HOSPADM

## 2025-05-20 RX ORDER — HALOPERIDOL 5 MG/ML
5 INJECTION INTRAMUSCULAR
Status: CANCELLED | OUTPATIENT
Start: 2025-05-20

## 2025-05-20 RX ORDER — BENZTROPINE MESYLATE 1 MG/ML
1 INJECTION, SOLUTION INTRAMUSCULAR; INTRAVENOUS
Status: CANCELLED | OUTPATIENT
Start: 2025-05-20

## 2025-05-20 RX ORDER — ACETAMINOPHEN 325 MG/1
650 TABLET ORAL EVERY 6 HOURS PRN
Status: DISCONTINUED | OUTPATIENT
Start: 2025-05-20 | End: 2025-05-30 | Stop reason: HOSPADM

## 2025-05-20 RX ORDER — HYDROXYZINE HYDROCHLORIDE 50 MG/1
100 TABLET, FILM COATED ORAL
Status: DISCONTINUED | OUTPATIENT
Start: 2025-05-20 | End: 2025-05-30 | Stop reason: HOSPADM

## 2025-05-20 RX ORDER — BISACODYL 10 MG
10 SUPPOSITORY, RECTAL RECTAL DAILY PRN
Status: DISCONTINUED | OUTPATIENT
Start: 2025-05-20 | End: 2025-05-30 | Stop reason: HOSPADM

## 2025-05-20 RX ORDER — TRAZODONE HYDROCHLORIDE 50 MG/1
50 TABLET ORAL
Status: DISCONTINUED | OUTPATIENT
Start: 2025-05-20 | End: 2025-05-30 | Stop reason: HOSPADM

## 2025-05-20 RX ORDER — HYDROXYZINE HYDROCHLORIDE 50 MG/1
50 TABLET, FILM COATED ORAL
Status: DISCONTINUED | OUTPATIENT
Start: 2025-05-20 | End: 2025-05-30 | Stop reason: HOSPADM

## 2025-05-20 RX ORDER — PROPRANOLOL HCL 20 MG
10 TABLET ORAL EVERY 8 HOURS PRN
Status: CANCELLED | OUTPATIENT
Start: 2025-05-20

## 2025-05-20 RX ORDER — BISACODYL 10 MG
10 SUPPOSITORY, RECTAL RECTAL DAILY PRN
Status: CANCELLED | OUTPATIENT
Start: 2025-05-20

## 2025-05-20 RX ORDER — HALOPERIDOL 5 MG/1
5 TABLET ORAL
Status: DISCONTINUED | OUTPATIENT
Start: 2025-05-20 | End: 2025-05-30 | Stop reason: HOSPADM

## 2025-05-20 RX ORDER — BENZTROPINE MESYLATE 1 MG/ML
1 INJECTION, SOLUTION INTRAMUSCULAR; INTRAVENOUS 2 TIMES DAILY PRN
Status: CANCELLED | OUTPATIENT
Start: 2025-05-20

## 2025-05-20 RX ORDER — ACETAMINOPHEN 325 MG/1
650 TABLET ORAL EVERY 6 HOURS PRN
Status: CANCELLED | OUTPATIENT
Start: 2025-05-20

## 2025-05-20 RX ADMIN — NICOTINE POLACRILEX 4 MG: 4 GUM, CHEWING BUCCAL at 19:40

## 2025-05-20 RX ADMIN — HYDROXYZINE HYDROCHLORIDE 100 MG: 50 TABLET, FILM COATED ORAL at 22:42

## 2025-05-20 RX ADMIN — TRAZODONE HYDROCHLORIDE 50 MG: 50 TABLET ORAL at 21:42

## 2025-05-20 RX ADMIN — NICOTINE POLACRILEX 4 MG: 4 GUM, CHEWING BUCCAL at 21:34

## 2025-05-20 RX ADMIN — HYDROXYZINE HYDROCHLORIDE 100 MG: 50 TABLET, FILM COATED ORAL at 15:34
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
## 2025-05-20 NOTE — PLAN OF CARE
Problem: PAIN - ADULT  Goal: Verbalizes/displays adequate comfort level or baseline comfort level  Description: Interventions:  - Encourage patient to monitor pain and request assistance  - Assess pain using appropriate pain scale  - Administer analgesics as ordered based on type and severity of pain and evaluate response  - Implement non-pharmacological measures as appropriate and evaluate response  - Consider cultural and social influences on pain and pain management  - Notify physician/advanced practitioner if interventions unsuccessful or patient reports new pain  - Educate patient/family on pain management process including their role and importance of  reporting pain   - Provide non-pharmacologic/complimentary pain relief interventions  Outcome: Progressing     Problem: Knowledge Deficit  Goal: Patient/family/caregiver demonstrates understanding of disease process, treatment plan, medications, and discharge instructions  Description: Complete learning assessment and assess knowledge base.  Interventions:  - Provide teaching at level of understanding  - Provide teaching via preferred learning methods  Outcome: Progressing     Problem: SELF HARM/SUICIDALITY  Goal: Will have no self-injury during hospital stay  Description: INTERVENTIONS:  - Q 15 MINUTES: Routine safety checks  - Q WAKING SHIFT & PRN: Assess risk to determine if routine checks are adequate to maintain patient safety  - Encourage patient to participate actively in care by formulating a plan to combat response to suicidal ideation, identify supports and resources  Outcome: Progressing     Problem: DEPRESSION  Goal: Will be euthymic at discharge  Description: INTERVENTIONS:  - Administer medication as ordered  - Provide emotional support via 1:1 interaction with staff  - Encourage involvement in milieu/groups/activities  - Monitor for social isolation  Outcome: Progressing     Problem: ANXIETY  Goal: Will report anxiety at manageable  levels  Description: INTERVENTIONS:  - Administer medication as ordered  - Teach and encourage coping skills  - Provide emotional support  - Assess patient/family for anxiety and ability to cope  Outcome: Progressing  Goal: By discharge: Patient will verbalize 2 strategies to deal with anxiety  Description: Interventions:  - Identify any obvious source/trigger to anxiety  - Staff will assist patient in applying identified coping technique/skills  - Encourage attendance of scheduled groups and activities  Outcome: Progressing

## 2025-05-20 NOTE — ED NOTES
Met with patient after patient requested to talk to a mental health professional.  Patient under one-to-one observation.  Patient pacing in the room.  Crisis introduced self and role.  Patient with questions about why she is in the hospital.  Patient minimized the reason for being brought to the hospital, testimony on 302 petition. Explained the 302, plan of transfer to a U for further evaluation and treatment and that bed search is ongoing.  Reviewed voluntary rights and Bill of Rights with the patient, right to .  Patient requested to use a phone to call her son's father.  Nursing was updated of the same and advised that the patient had already spoken to him and, that the patient had earlier spoken threatened to kill herself while on the phone with family.       Under review at [facility].
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
## 2025-05-20 NOTE — ED NOTES
Original 302 on paper chart at nurse's station.  Copy in Cw office.  302 sent to [facility] crisis and  intake.  Bed search and insurance pre-cert needed at this time.

## 2025-05-20 NOTE — ED PROVIDER NOTES
Time reflects when diagnosis was documented in both MDM as applicable and the Disposition within this note       Time User Action Codes Description Comment    5/19/2025 10:18 PM Brett Steve [F29] Psychosis (HCC)     5/19/2025 10:18 PM Brett Steve [R45.851] Suicidal ideation           ED Disposition       ED Disposition   Transfer to Behavioral Health    Condition   --    Date/Time   Mon May 19, 2025 10:58 PM    Comment   Evonne Seth should be transferred out to Behavioral Health and has been medically cleared.                Assessment & Plan       Medical Decision Making  23 y/o female presents to the ED via police (David LEAHY) for psychiatric evaluation.  Per police, the patient's family called 911 due to the fact that she was persistently threatening to kill herself today and then left the house.  The police assist with providing history and state that they found the patient standing in the middle of a local street, partially disrobed, and stating that she was trying to get hit by traffic so that she could die.  She then asked the police officers to shoot her so that she can die.  The police then brought her to the ED for evaluation.  History is limited as provided by the patient as she is a poor historian at this time due to her current psychiatric distress.    Vital signs reviewed. Adult female, brought in by police and handcuffs present, pacing around room, appearing manic and with features of psychosis, verbalizing Hinduism delusions, paranoia, and suicidal ideation with plan.  Appears to be in acute psychiatric distress. The patient is pacing around room, psychomotor agitation, appearing manic and with features of psychosis, verbalizing Hinduism delusions, paranoia, and suicidal ideation with plan.  Patient states that she was trying to get run over by traffic or have the police shoot her to end her life.  Patient is tangential and emotionally labile, and she requires frequent redirection.  See physical exam documentation for full exam findings.  Differential diagnosis includes but is not limited to new onset schizophrenia, substance-induced psychosis, acute bipolar disorder with teodora, depression with suicidal ideation, and/or other primary psychiatric diagnosis.     Patient was seen in the ED yesterday for psychiatric evaluation and had suicidal ideation at that time with no intent or plan, no criteria for 302 and the patient was evaluated by crisis and declined admission.  She presents today with acute psychiatric decompensation and furtherance of suicidal ideation now with plan and attempt.  Per police, the patient was standing partially disrobed in the middle of a local street, attempting to get hit by traffic and she verbalized to police that this was her plan.  She also requested the police officers shoot her to end her life.  On arrival the patient has features of psychosis, verbalizing Judaism delusions about God and also verbalizing paranoia.  She is also repeating her suicidal ideation and intent verbally.  Psychomotor agitation, emotionally labile, pressured speech.  Patient requiring sedating medications at this time.  Will start with olanzapine 10 mg IM and lorazepam 2 mg IM.  Patient tolerated medications well and did not require physical restraints.    The police officers who brought the patient to the ED initiated 302 petition and I plan to uphold, given the patient's current clinical presentation as well as the act of furtherance by witnessed attempts by the patient to be hit by traffic or her requests to have  shoot her.  Crisis consulted.  The patient is medically cleared for behavioral health evaluation and admission.  The patient was signed out at end of shift, pending acceptance and transfer for psychiatric admission.    Amount and/or Complexity of Data Reviewed  Labs: ordered. Decision-making details documented in ED Course.    Risk  Prescription drug  "management.  Decision regarding hospitalization.        ED Course as of 05/20/25 0338   Mon May 19, 2025   2216 Acute agitation with Faith delusions, paranoia, verbalizing suicidal thoughts with suicide intent and plan.  Psychomotor agitation, emotionally labile, pressured speech.  Patient requiring sedating medications at this time.  Will start with olanzapine 10 mg IM and lorazepam 2 mg IM.   2241 EXTBreath Alcohol: 0.000   2242 Medically cleared for crisis/behavioral health evaluation       Medications   OLANZapine (ZyPREXA) IM injection 10 mg (10 mg Intramuscular Given 5/19/25 2219)   LORazepam (ATIVAN) injection 2 mg (2 mg Intramuscular Given 5/19/25 2219)   sterile water injection **ADS Override Pull** (  Override Pull 5/19/25 2219)       ED Risk Strat Scores                    No data recorded        SBIRT 22yo+      Flowsheet Row Most Recent Value   Initial Alcohol Screen: US AUDIT-C     1. How often do you have a drink containing alcohol? 0 Filed at: 05/19/2025 2256   2. How many drinks containing alcohol do you have on a typical day you are drinking?  0 Filed at: 05/19/2025 2256   3b. FEMALE Any Age, or MALE 65+: How often do you have 4 or more drinks on one occassion? 0 Filed at: 05/19/2025 2256   Audit-C Score 0 Filed at: 05/19/2025 2256   CAROLANN: How many times in the past year have you...    Used an illegal drug or used a prescription medication for non-medical reasons? Never Filed at: 05/19/2025 2256   DAST-10: In the past 12 months...    1. Have you used drugs other than those required for medical reasons? 0 Filed at: 05/19/2025 2256   2. Do you use more than one drug at a time? 0 Filed at: 05/19/2025 2256   3. Have you had medical problems as a result of your drug use (e.g., memory loss, hepatitis, convulsions, bleeding, etc.)? 0 Filed at: 05/19/2025 2256   4. Have you had \"blackouts\" or \"flashbacks\" as a result of drug use?YesNo 0 Filed at: 05/19/2025 2256   5. Do you ever feel bad or guilty " about your drug use? 0 Filed at: 05/19/2025 2256   6. Does your spouse (or parent) ever complain about your involvement with drugs? 0 Filed at: 05/19/2025 2256   7. Have you neglected your family because of your use of drugs? 0 Filed at: 05/19/2025 2256   8. Have you engaged in illegal activities in order to obtain drugs? 0 Filed at: 05/19/2025 2256   9. Have you ever experienced withdrawal symptoms (felt sick) when you stopped taking drugs? 0 Filed at: 05/19/2025 2256   10. Are you always able to stop using drugs when you want to? 0 Filed at: 05/19/2025 2256   DAST-10 Score 0 Filed at: 05/19/2025 2256                            History of Present Illness       Chief Complaint   Patient presents with    Psychiatric Evaluation     Pt arrives with [employer] Police after mom called due to patient threatening to kill herself. Pt found in street with pants down and shirt off asking for officers to shoot her and to let her back in traffic so she can get hit by a car.        Past Medical History:   Diagnosis Date    Anxiety     Asthma     Depression       History reviewed. No pertinent surgical history.   History reviewed. No pertinent family history.   Social History[1]   E-Cigarette/Vaping    E-Cigarette Use Never User       E-Cigarette/Vaping Substances    Nicotine Yes       I have reviewed and agree with the history as documented.     23 y/o female presents to the ED via police (David LEAHY) for psychiatric evaluation.  Per police, the patient's family called 911 due to the fact that she was persistently threatening to kill herself today and then left the house.  The police assist with providing history and state that they found the patient standing in the middle of a local street, partially disrobed, and stating that she was trying to get hit by traffic so that she could die.  She then asked the police officers to shoot her so that she can die.  The police then brought her to the ED for evaluation.  History is limited as  provided by the patient as she is a poor historian at this time due to her current psychiatric distress.        Review of Systems   Unable to perform ROS: Psychiatric disorder           Objective       ED Triage Vitals [05/19/25 2240]   Temperature Pulse Blood Pressure Respirations SpO2 Patient Position - Orthostatic VS   98.4 °F (36.9 °C) 98 117/72 17 98 % Lying      Temp Source Heart Rate Source BP Location FiO2 (%) Pain Score    Oral Monitor Left arm -- No Pain      Vitals      Date and Time Temp Pulse SpO2 Resp BP Pain Score FACES Pain Rating User   05/19/25 2240 98.4 °F (36.9 °C) 98 98 % 17 117/72 No Pain -- CW            Physical Exam  Vitals and nursing note reviewed.   Constitutional:       General: She is in acute distress.      Appearance: Normal appearance. She is normal weight.      Comments: Adult female, brought in by police and handcuffs present, pacing around room, appearing manic and with features of psychosis, verbalizing Jehovah's witness delusions, paranoia, and suicidal ideation with plan.  Appears to be in acute psychiatric distress.   HENT:      Head: Normocephalic and atraumatic.      Right Ear: External ear normal.      Left Ear: External ear normal.      Nose: Nose normal. No congestion or rhinorrhea.      Mouth/Throat:      Mouth: Mucous membranes are moist.      Pharynx: Oropharynx is clear. No oropharyngeal exudate or posterior oropharyngeal erythema.     Eyes:      Extraocular Movements: Extraocular movements intact.      Conjunctiva/sclera: Conjunctivae normal.      Pupils: Pupils are equal, round, and reactive to light.       Cardiovascular:      Rate and Rhythm: Normal rate and regular rhythm.      Pulses: Normal pulses.      Heart sounds: Normal heart sounds. No murmur heard.  Pulmonary:      Effort: Pulmonary effort is normal. No respiratory distress.      Breath sounds: Normal breath sounds. No wheezing or rales.   Abdominal:      General: Abdomen is flat. Bowel sounds are normal. There  is no distension.      Palpations: Abdomen is soft.      Tenderness: There is no abdominal tenderness. There is no right CVA tenderness, left CVA tenderness or guarding.     Musculoskeletal:         General: No swelling or tenderness. Normal range of motion.      Cervical back: Normal range of motion and neck supple. No tenderness.     Skin:     General: Skin is warm and dry.      Capillary Refill: Capillary refill takes less than 2 seconds.     Neurological:      General: No focal deficit present.      Mental Status: She is alert and oriented to person, place, and time.     Psychiatric:      Comments: The patient is pacing around room, psychomotor agitation, appearing manic and with features of psychosis, verbalizing Jew delusions, paranoia, and suicidal ideation with plan.  Patient states that she was trying to get run over by traffic or have the police shoot her to end her life.  Patient is tangential and emotionally labile, and she requires frequent redirection.         Results Reviewed       Procedure Component Value Units Date/Time    Rapid drug screen, urine [[id number]]  (Abnormal) Collected: 05/20/25 0005    Lab Status: Final result Specimen: Urine, Clean Catch Updated: 05/20/25 0031     Amph/Meth UR Negative     Barbiturate Ur Negative     Benzodiazepine Urine Negative     Cocaine Urine Negative     Methadone Urine Negative     Opiate Urine Negative     PCP Ur Negative     THC Urine Positive     Oxycodone Urine Negative     Fentanyl Urine Negative     HYDROCODONE URINE Negative    Narrative:      Presumptive report. If requested, specimen will be sent to reference lab for confirmation.  FOR MEDICAL PURPOSES ONLY.   IF CONFIRMATION NEEDED PLEASE CONTACT THE LAB WITHIN 5 DAYS.    Drug Screen Cutoff Levels:  AMPHETAMINE/METHAMPHETAMINES  1000 ng/mL  BARBITURATES     200 ng/mL  BENZODIAZEPINES     200 ng/mL  COCAINE      300 ng/mL  METHADONE      300 ng/mL  OPIATES      300 ng/mL  PHENCYCLIDINE     25  ng/mL  THC       50 ng/mL  OXYCODONE      100 ng/mL  FENTANYL      5 ng/mL  HYDROCODONE     300 ng/mL    POCT alcohol breath test [[id number]]  (Normal) Collected: 05/19/25 2241    Lab Status: Final result Updated: 05/19/25 2241     EXTBreath Alcohol 0.000    POCT pregnancy, urine [[id number]]     Lab Status: No result             No orders to display       Procedures    ED Medication and Procedure Management   Prior to Admission Medications   Prescriptions Last Dose Informant Patient Reported? Taking?   hydrocortisone 2.5 % lotion   No No   Sig: Apply topically 2 (two) times a day for 14 days   naproxen (Naprosyn) 500 mg tablet   No No   Sig: Take 1 tablet (500 mg total) by mouth 2 (two) times a day with meals for 21 days   ondansetron (ZOFRAN-ODT) 4 mg disintegrating tablet   No No   Sig: Take 1 tablet (4 mg total) by mouth every 6 (six) hours as needed for nausea or vomiting for up to 3 days   ondansetron (ZOFRAN-ODT) 4 mg disintegrating tablet   No No   Sig: Take 1 tablet (4 mg total) by mouth every 8 (eight) hours as needed for vomiting or nausea   Patient not taking: Reported on 5/18/2025      Facility-Administered Medications: None     Patient's Medications   Discharge Prescriptions    No medications on file     No discharge procedures on file.  ED SEPSIS DOCUMENTATION   Time reflects when diagnosis was documented in both MDM as applicable and the Disposition within this note       Time User Action Codes Description Comment    5/19/2025 10:18 PM Brett Steve [F29] Psychosis (HCC)     5/19/2025 10:18 PM Brett Steve [R45.851] Suicidal ideation                    [1]   Social History  Tobacco Use    Smoking status: Former     Current packs/day: 0.25     Average packs/day: 0.3 packs/day for 2.4 years (0.6 ttl pk-yrs)     Types: Cigarettes     Start date: 12/29/2022    Smokeless tobacco: Never   Vaping Use    Vaping status: Never Used   Substance Use Topics    Alcohol use: Not Currently     Comment:  socially    Drug use: Yes     Frequency: 1.0 times per week     Types: Marijuana     Comment: last used 4/28        Brett Steve MD  05/20/25 0331

## 2025-05-20 NOTE — ED NOTES
Police taking handcuffs off. Patient cooperative during vital signs. Pt to change into paper scrubs.     Rohini Steve V RN  05/19/25 7163

## 2025-05-20 NOTE — ED NOTES
Chart and handoff reviewed.  Two-physician 302 upheld. Was sent to [facility] and -[facility].  -[facility] has the referral.  [facility] bed availability TBD. [facility] to follow up.

## 2025-05-20 NOTE — PROGRESS NOTES
-Blue pants  -Gray Haulter top  -Crocs w/ marijuane leaves  -(2) Hair ties  -Glasses    Bedside Belongings;  -Blue pants  -Glasses

## 2025-05-20 NOTE — ED NOTES
Pt changed into paper scrubs. Crisis worker, Pasha at bedside.      Rohini Steve V RN  05/19/25 1054

## 2025-05-20 NOTE — ED NOTES
Patient is accepted at [facility]  Patient is accepted by Dr. Marcano with orders by YOLANDA Hunter per Marita, SL-Intake     Transportation is arranged with [employer] for BLS.       Nurse report is to be called to [phone] prior to patient transfer.

## 2025-05-20 NOTE — ED CARE HANDOFF
Emergency Department Sign Out Note        Sign out and transfer of care from . See Separate Emergency Department note.     The patient, Evonne Seth, was evaluated by the previous provider for SI    Workup Completed:  Labs Reviewed   RAPID DRUG SCREEN, URINE - Abnormal       Result Value Ref Range Status    Amph/Meth UR Negative  Negative Final    Barbiturate Ur Negative  Negative Final    Benzodiazepine Urine Negative  Negative Final    Cocaine Urine Negative  Negative Final    Methadone Urine Negative  Negative Final    Opiate Urine Negative  Negative Final    PCP Ur Negative  Negative Final    THC Urine Positive (*) Negative Final    Oxycodone Urine Negative  Negative Final    Fentanyl Urine Negative  Negative Final    HYDROCODONE URINE Negative  Negative Final    Narrative:     Presumptive report. If requested, specimen will be sent to reference lab for confirmation.  FOR MEDICAL PURPOSES ONLY.   IF CONFIRMATION NEEDED PLEASE CONTACT THE LAB WITHIN 5 DAYS.    Drug Screen Cutoff Levels:  AMPHETAMINE/METHAMPHETAMINES  1000 ng/mL  BARBITURATES     200 ng/mL  BENZODIAZEPINES     200 ng/mL  COCAINE      300 ng/mL  METHADONE      300 ng/mL  OPIATES      300 ng/mL  PHENCYCLIDINE     25 ng/mL  THC       50 ng/mL  OXYCODONE      100 ng/mL  FENTANYL      5 ng/mL  HYDROCODONE     300 ng/mL   POCT PREGNANCY, URINE - Normal    EXT Preg Test, Ur Negative  Negative Final    Control Valid  Valid Final   POCT ALCOHOL BREATH TEST - Normal    EXTBreath Alcohol 0.000   Final         ED Course / Workup Pending (followup):    This is a 25 years old brought by the police, as family members called 911.  Patient was running in the street almost naked and when she saw the police she asked the To shoot her.  Patient was recently at our ER.  The police signed 302. patient has no disruptive behavior at the ER.   Patient at the ER denies SI or HI.  Patient is medically clear for psych evaluation and possible admission.  Pt  accepted at [facility] . DR. Mcnally accepted.       No data recorded                             Procedures  Medical Decision Making  Amount and/or Complexity of Data Reviewed  Labs: ordered.    Risk  Prescription drug management.  Decision regarding hospitalization.            Disposition  Final diagnoses:   Psychosis (HCC)   Suicidal ideation     Time reflects when diagnosis was documented in both MDM as applicable and the Disposition within this note       Time User Action Codes Description Comment    5/19/2025 10:18 PM Brett Steve [F29] Psychosis (HCC)     5/19/2025 10:18 PM Brett Steve [R45.851] Suicidal ideation           ED Disposition       ED Disposition   Transfer to Behavioral [facility]    Condition   --    Date/Time   Mon May 19, 2025 10:58 PM    Comment   Evonne Seth should be transferred out to Behavioral Health and has been medically cleared.                MD Documentation      Flowsheet Row Most Recent Value   Sending MD Dr. Brett Steve MD          Follow-up Information    None       Patient's Medications   Discharge Prescriptions    No medications on file     No discharge procedures on file.       ED Provider  Electronically Signed by     Ashley Craig MD  05/20/25 0276

## 2025-05-20 NOTE — ED NOTES
Insurance Authorization for admission:   Phone call placed to [facility]  Phone number: [phone]     Spoke to Diana     8 days approved (5/20-5/27/25)  Level of care: ROBY CRUZ.  Review on 5/27/25.   Authorization # [id number].  Pending assignment to a . They will reach out to  on 5/27/25.         Eligibility Verification System checked - ([phone]).  Online system / automated system indicates: Not on file    Insurance Authorization for Transportation:    JASPREET

## 2025-05-20 NOTE — EMTALA/ACUTE CARE TRANSFER
[facility] EMERGENCY DEPARTMENT  [address] [place] [id number]  Dept: [phone]      EMTALA TRANSFER CONSENT    NAME Evonne ARELLANO 2000                              MRN [id number]    I have been informed of my rights regarding examination, treatment, and transfer   by Dr. Ashley Craig MD    Benefits: Specialized equipment and/or services available at the receiving facility (Include comment)________________________    Risks: Potential for delay in receiving treatment, Potential deterioration of medical condition, Increased discomfort during transfer, Possible worsening of condition or death during transfer      Transfer Request   I acknowledge that my medical condition has been evaluated and explained to me by the emergency department physician or other qualified medical person and/or my attending physician who has recommended and offered to me further medical examination and treatment. I understand the Hospital's obligation with respect to the treatment and stabilization of my emergency medical condition. I nevertheless request to be transferred. I release the Hospital, the doctor, and any other persons caring for me from all responsibility or liability for any injury or ill effects that may result from my transfer and agree to accept all responsibility for the consequences of my choice to transfer, rather than receive stabilizing treatment at the Hospital. I understand that because the transfer is my request, my insurance may not provide reimbursement for the services.  The Hospital will assist and direct me and my family in how to make arrangements for transfer, but the hospital is not liable for any fees charged by the transport service.  In spite of this understanding, I refuse to consent to further medical examination and treatment which has been offered to me, and request transfer to Accepting Facility Name, City & State :  [facility]. I authorize the performance of emergency medical procedures and treatments upon me in both transit and upon arrival at the receiving facility.  Additionally, I authorize the release of any and all medical records to the receiving facility and request they be transported with me, if possible.    I authorize the performance of emergency medical procedures and treatments upon me in both transit and upon arrival at the receiving facility.  Additionally, I authorize the release of any and all medical records to the receiving facility and request they be transported with me, if possible.  I understand that the safest mode of transportation during a medical emergency is an ambulance and that the Hospital advocates the use of this mode of transport. Risks of traveling to the receiving facility by car, including absence of medical control, life sustaining equipment, such as oxygen, and medical personnel has been explained to me and I fully understand them.    (JEFRY CORRECT BOX BELOW)  [  ]  I consent to the stated transfer and to be transported by ambulance/helicopter.  [  ]  I consent to the stated transfer, but refuse transportation by ambulance and accept full responsibility for my transportation by car.  I understand the risks of non-ambulance transfers and I exonerate the Hospital and its staff from any deterioration in my condition that results from this refusal.    X___________________________________________    DATE  25  TIME________  Signature of patient or legally responsible individual signing on patient behalf           RELATIONSHIP TO PATIENT_________________________          Provider Certification    NAME Evonne Seth                                        [facility] 2000                              MRN [id number]    A medical screening exam was performed on the above named patient.  Based on the examination:    Condition Necessitating Transfer The primary encounter diagnosis was  Psychosis (HCC). Diagnoses of Suicidal ideation and Medical clearance for psychiatric admission were also pertinent to this visit.    Patient Condition: The patient has been stabilized such that within reasonable medical probability, no material deterioration of the patient condition or the condition of the unborn child(vishal) is likely to result from the transfer    Reason for Transfer: Level of Care needed not available at this facility    Transfer Requirements: Facility [facility]   Space available and qualified personnel available for treatment as acknowledged by    Agreed to accept transfer and to provide appropriate medical treatment as acknowledged by       Dr. Mcnally  Appropriate medical records of the examination and treatment of the patient are provided at the time of transfer   STAFF INITIAL WHEN COMPLETED _______  Transfer will be performed by qualified personnel from    and appropriate transfer equipment as required, including the use of necessary and appropriate life support measures.    Provider Certification: I have examined the patient and explained the following risks and benefits of being transferred/refusing transfer to the patient/family:  General risk, such as traffic hazards, adverse weather conditions, rough terrain or turbulence, possible failure of equipment (including vehicle or aircraft), or consequences of actions of persons outside the control of the transport personnel, Unanticipated needs of medical equipment and personnel during transport, Risk of worsening condition, The possibility of a transport vehicle being unavailable      Based on these reasonable risks and benefits to the patient and/or the unborn child(vishal), and based upon the information available at the time of the patient’s examination, I certify that the medical benefits reasonably to be expected from the provision of appropriate medical treatments at another medical facility outweigh the increasing risks, if any,  to the individual’s medical condition, and in the case of labor to the unborn child, from effecting the transfer.    X____________________________________________ DATE 05/20/25        TIME_______      ORIGINAL - SEND TO MEDICAL RECORDS   COPY - SEND WITH PATIENT DURING TRANSFER

## 2025-05-20 NOTE — ED NOTES
Email communication with Ashley Tejeda about the 302. Page 3 is missing the date and phone number for Officer Pete. [facility] also asking for update on who is caring for patient's 7 y.o. son.     [employer] followed up. [employer]; advocate via phone - 7 y.o is being cared for by family - bio dad or grandma.  Was given contact information for the Sergeant to be included on the 302.  Updated the 302.  Emailed to Ashley Tejeda. Updated County of accepting facility.     Emailed copy of 302 to [facility].

## 2025-05-21 PROBLEM — F32.9 MDD (MAJOR DEPRESSIVE DISORDER): Status: ACTIVE | Noted: 2025-05-21

## 2025-05-21 PROBLEM — F12.90 CANNABIS USE DISORDER: Status: ACTIVE | Noted: 2025-05-21

## 2025-05-21 PROBLEM — Z00.8 MEDICAL CLEARANCE FOR PSYCHIATRIC ADMISSION: Status: ACTIVE | Noted: 2025-05-21

## 2025-05-21 PROBLEM — F31.2 BIPOLAR AFFECTIVE DISORDER, CURRENTLY MANIC, SEVERE, WITH PSYCHOTIC FEATURES (HCC): Chronic | Status: ACTIVE | Noted: 2025-05-21

## 2025-05-21 PROBLEM — F31.2 BIPOLAR AFFECTIVE DISORDER, CURRENTLY MANIC, SEVERE, WITH PSYCHOTIC FEATURES (HCC): Status: ACTIVE | Noted: 2025-05-21

## 2025-05-21 LAB
25(OH)D3 SERPL-MCNC: 10.9 NG/ML (ref 30–100)
ALBUMIN SERPL BCG-MCNC: 4.8 G/DL (ref 3.5–5)
ALP SERPL-CCNC: 51 U/L (ref 34–104)
ALT SERPL W P-5'-P-CCNC: 16 U/L (ref 7–52)
ANION GAP SERPL CALCULATED.3IONS-SCNC: 8 MMOL/L (ref 4–13)
AST SERPL W P-5'-P-CCNC: 21 U/L (ref 13–39)
BASOPHILS # BLD AUTO: 0.05 THOUSANDS/ÂΜL (ref 0–0.1)
BASOPHILS NFR BLD AUTO: 1 % (ref 0–1)
BILIRUB SERPL-MCNC: 0.46 MG/DL (ref 0.2–1)
BUN SERPL-MCNC: 10 MG/DL (ref 5–25)
CALCIUM SERPL-MCNC: 9.5 MG/DL (ref 8.4–10.2)
CHLORIDE SERPL-SCNC: 105 MMOL/L (ref 96–108)
CHOLEST SERPL-MCNC: 137 MG/DL (ref ?–200)
CO2 SERPL-SCNC: 27 MMOL/L (ref 21–32)
CREAT SERPL-MCNC: 0.58 MG/DL (ref 0.6–1.3)
EOSINOPHIL # BLD AUTO: 0.16 THOUSAND/ÂΜL (ref 0–0.61)
EOSINOPHIL NFR BLD AUTO: 2 % (ref 0–6)
ERYTHROCYTE [DISTWIDTH] IN BLOOD BY AUTOMATED COUNT: 13.4 % (ref 11.6–15.1)
EST. AVERAGE GLUCOSE BLD GHB EST-MCNC: 108 MG/DL
FOLATE SERPL-MCNC: 18.3 NG/ML
GFR SERPL CREATININE-BSD FRML MDRD: 129 ML/MIN/1.73SQ M
GLUCOSE P FAST SERPL-MCNC: 83 MG/DL (ref 65–99)
GLUCOSE SERPL-MCNC: 83 MG/DL (ref 65–140)
HBA1C MFR BLD: 5.4 %
HCT VFR BLD AUTO: 42.5 % (ref 34.8–46.1)
HDLC SERPL-MCNC: 55 MG/DL
HGB BLD-MCNC: 13.4 G/DL (ref 11.5–15.4)
IMM GRANULOCYTES # BLD AUTO: 0.01 THOUSAND/UL (ref 0–0.2)
IMM GRANULOCYTES NFR BLD AUTO: 0 % (ref 0–2)
LDLC SERPL CALC-MCNC: 71 MG/DL (ref 0–100)
LYMPHOCYTES # BLD AUTO: 3.48 THOUSANDS/ÂΜL (ref 0.6–4.47)
LYMPHOCYTES NFR BLD AUTO: 53 % (ref 14–44)
MCH RBC QN AUTO: 27.9 PG (ref 26.8–34.3)
MCHC RBC AUTO-ENTMCNC: 31.5 G/DL (ref 31.4–37.4)
MCV RBC AUTO: 89 FL (ref 82–98)
MONOCYTES # BLD AUTO: 0.57 THOUSAND/ÂΜL (ref 0.17–1.22)
MONOCYTES NFR BLD AUTO: 9 % (ref 4–12)
NEUTROPHILS # BLD AUTO: 2.31 THOUSANDS/ÂΜL (ref 1.85–7.62)
NEUTS SEG NFR BLD AUTO: 35 % (ref 43–75)
NONHDLC SERPL-MCNC: 82 MG/DL
NRBC BLD AUTO-RTO: 0 /100 WBCS
PLATELET # BLD AUTO: 311 THOUSANDS/UL (ref 149–390)
PMV BLD AUTO: 10.3 FL (ref 8.9–12.7)
POTASSIUM SERPL-SCNC: 3.4 MMOL/L (ref 3.5–5.3)
PROT SERPL-MCNC: 7.6 G/DL (ref 6.4–8.4)
RBC # BLD AUTO: 4.8 MILLION/UL (ref 3.81–5.12)
SODIUM SERPL-SCNC: 140 MMOL/L (ref 135–147)
TRIGL SERPL-MCNC: 56 MG/DL (ref ?–150)
TSH SERPL DL<=0.05 MIU/L-ACNC: 1.3 UIU/ML (ref 0.45–4.5)
VIT B12 SERPL-MCNC: 437 PG/ML (ref 180–914)
WBC # BLD AUTO: 6.58 THOUSAND/UL (ref 4.31–10.16)

## 2025-05-21 PROCEDURE — 80061 LIPID PANEL: CPT | Performed by: PHYSICIAN ASSISTANT

## 2025-05-21 PROCEDURE — 82306 VITAMIN D 25 HYDROXY: CPT | Performed by: PHYSICIAN ASSISTANT

## 2025-05-21 PROCEDURE — 80053 COMPREHEN METABOLIC PANEL: CPT | Performed by: PHYSICIAN ASSISTANT

## 2025-05-21 PROCEDURE — 93005 ELECTROCARDIOGRAM TRACING: CPT

## 2025-05-21 PROCEDURE — 82746 ASSAY OF FOLIC ACID SERUM: CPT | Performed by: PHYSICIAN ASSISTANT

## 2025-05-21 PROCEDURE — 83036 HEMOGLOBIN GLYCOSYLATED A1C: CPT | Performed by: PHYSICIAN ASSISTANT

## 2025-05-21 PROCEDURE — 99223 1ST HOSP IP/OBS HIGH 75: CPT | Performed by: STUDENT IN AN ORGANIZED HEALTH CARE EDUCATION/TRAINING PROGRAM

## 2025-05-21 PROCEDURE — 86780 TREPONEMA PALLIDUM: CPT | Performed by: PHYSICIAN ASSISTANT

## 2025-05-21 PROCEDURE — 99221 1ST HOSP IP/OBS SF/LOW 40: CPT | Performed by: PHYSICIAN ASSISTANT

## 2025-05-21 PROCEDURE — 82607 VITAMIN B-12: CPT | Performed by: PHYSICIAN ASSISTANT

## 2025-05-21 PROCEDURE — 84443 ASSAY THYROID STIM HORMONE: CPT | Performed by: PHYSICIAN ASSISTANT

## 2025-05-21 PROCEDURE — 85025 COMPLETE CBC W/AUTO DIFF WBC: CPT | Performed by: PHYSICIAN ASSISTANT

## 2025-05-21 RX ORDER — LITHIUM CARBONATE 450 MG
450 TABLET, EXTENDED RELEASE ORAL
Status: DISCONTINUED | OUTPATIENT
Start: 2025-05-21 | End: 2025-05-27

## 2025-05-21 RX ORDER — OLANZAPINE 5 MG/1
5 TABLET, FILM COATED ORAL
Status: DISCONTINUED | OUTPATIENT
Start: 2025-05-21 | End: 2025-05-24

## 2025-05-21 RX ADMIN — TRAZODONE HYDROCHLORIDE 50 MG: 50 TABLET ORAL at 23:53

## 2025-05-21 RX ADMIN — NICOTINE POLACRILEX 4 MG: 4 GUM, CHEWING BUCCAL at 07:03

## 2025-05-21 RX ADMIN — HYDROXYZINE HYDROCHLORIDE 100 MG: 50 TABLET, FILM COATED ORAL at 05:07

## 2025-05-21 RX ADMIN — NICOTINE POLACRILEX 2 MG: 4 GUM, CHEWING BUCCAL at 19:34

## 2025-05-21 RX ADMIN — NICOTINE POLACRILEX 2 MG: 4 GUM, CHEWING BUCCAL at 21:55

## 2025-05-21 RX ADMIN — LITHIUM CARBONATE 450 MG: 450 TABLET, EXTENDED RELEASE ORAL at 21:55

## 2025-05-21 RX ADMIN — NICOTINE POLACRILEX 2 MG: 4 GUM, CHEWING BUCCAL at 13:47

## 2025-05-21 RX ADMIN — HYDROXYZINE HYDROCHLORIDE 100 MG: 50 TABLET, FILM COATED ORAL at 17:17

## 2025-05-21 RX ADMIN — PROPRANOLOL HYDROCHLORIDE 10 MG: 10 TABLET ORAL at 00:33

## 2025-05-21 RX ADMIN — NICOTINE POLACRILEX 2 MG: 4 GUM, CHEWING BUCCAL at 17:18

## 2025-05-21 RX ADMIN — HYDROXYZINE HYDROCHLORIDE 100 MG: 50 TABLET, FILM COATED ORAL at 11:07

## 2025-05-21 RX ADMIN — OLANZAPINE 5 MG: 5 TABLET, FILM COATED ORAL at 21:55

## 2025-05-21 NOTE — PLAN OF CARE
Problem: PAIN - ADULT  Goal: Verbalizes/displays adequate comfort level or baseline comfort level  Description: Interventions:  - Encourage patient to monitor pain and request assistance  - Assess pain using appropriate pain scale  - Administer analgesics as ordered based on type and severity of pain and evaluate response  - Implement non-pharmacological measures as appropriate and evaluate response  - Consider cultural and social influences on pain and pain management  - Notify physician/advanced practitioner if interventions unsuccessful or patient reports new pain  - Educate patient/family on pain management process including their role and importance of  reporting pain   - Provide non-pharmacologic/complimentary pain relief interventions  Outcome: Progressing     Problem: DISCHARGE PLANNING  Goal: Discharge to home or other facility with appropriate resources  Description: INTERVENTIONS:  - Identify barriers to discharge w/patient and caregiver  - Arrange for needed discharge resources and transportation as appropriate  - Identify discharge learning needs (meds, wound care, etc.)  - Arrange for interpretive services to assist at discharge as needed  - Refer to Case Management Department for coordinating discharge planning if the patient needs post-hospital services based on physician/advanced practitioner order or complex needs related to functional status, cognitive ability, or social support system  Outcome: Progressing     Problem: Knowledge Deficit  Goal: Patient/family/caregiver demonstrates understanding of disease process, treatment plan, medications, and discharge instructions  Description: Complete learning assessment and assess knowledge base.  Interventions:  - Provide teaching at level of understanding  - Provide teaching via preferred learning methods  Outcome: Progressing     Problem: SELF HARM/SUICIDALITY  Goal: Will have no self-injury during hospital stay  Description: INTERVENTIONS:  - Q 15  MINUTES: Routine safety checks  - Q WAKING SHIFT & PRN: Assess risk to determine if routine checks are adequate to maintain patient safety  - Encourage patient to participate actively in care by formulating a plan to combat response to suicidal ideation, identify supports and resources  Outcome: Progressing     Problem: DEPRESSION  Goal: Will be euthymic at discharge  Description: INTERVENTIONS:  - Administer medication as ordered  - Provide emotional support via 1:1 interaction with staff  - Encourage involvement in milieu/groups/activities  - Monitor for social isolation  Outcome: Progressing     Problem: ANXIETY  Goal: Will report anxiety at manageable levels  Description: INTERVENTIONS:  - Administer medication as ordered  - Teach and encourage coping skills  - Provide emotional support  - Assess patient/family for anxiety and ability to cope  Outcome: Not Progressing  Goal: By discharge: Patient will verbalize 2 strategies to deal with anxiety  Description: Interventions:  - Identify any obvious source/trigger to anxiety  - Staff will assist patient in applying identified coping technique/skills  - Encourage attendance of scheduled groups and activities  Outcome: Not Progressing

## 2025-05-21 NOTE — NURSING NOTE
At 17:17 RN administered Atarax 100 mg PO for severe anxiety related to missing her son (Eric= 25). Upon follow-up, patient endorses medication effectiveness.

## 2025-05-21 NOTE — NURSING NOTE
Approx one hour after admin of Prn Inderal for restlessness, pt is observed in bed, lying still, eyes closed, resp even.

## 2025-05-21 NOTE — NURSING NOTE
"Pt had been given Atarax at 2242 for anxiety; she is presently in bed but easily aroused and states she is feeling a \"little restless and having dreams that keep waking\" her up.    "
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
## 2025-05-21 NOTE — NURSING NOTE
Pt received prn atarax 100mg PO at 0507 for severe anxiety (HS=28). Upon follow up, pt is observed resting quietly in bed, eyes closed, respirations equal and unlabored. Medication appears to have been effective.

## 2025-05-21 NOTE — NURSING NOTE
Patient approached RN station reporting heightened anxiety due to telling herself the truth but being unable to accept it. Patient relates this to fear of her child's safety. At 11:07 RN administered Aarax 100 mg PO for severe anxiety (Eric= 26). Upon follow-up, endorses medication effectiveness.

## 2025-05-21 NOTE — ASSESSMENT & PLAN NOTE
Patient is medically stable to continue inpatient psychiatric treatment.  Contact [facility] with any questions or concerns.

## 2025-05-21 NOTE — NURSING NOTE
Pt c/o inability to sleep due to restlessness stemming from dreams; she is constantly rubbing arms, alternating weight on legs, unable to stand still; Inderal given for restlessness.

## 2025-05-21 NOTE — NURSING NOTE
"Pt reports interrupted sleep overnight. States \"I thought the doctors were wheeling me out of bed\". Reports having nightmares and waking up sweating. Pt verbalized feeling anxious about child's safety. States son is with pt's father. States \"I asked the nurses to call [facility] for me last night and now my family won't talk to me\". Pt emotional and restless. Changing topics frequently during interaction. Denies SI/HI or hallucination. Focused on speaking with provider. Pt educated on coping skills.   "
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
## 2025-05-21 NOTE — CMS CERTIFICATION NOTE
Recertification: Based upon physical, mental and social evaluations, I certify that inpatient psychiatric services continue to be medically necessary for this patient for a duration of 12 midnights for the treatment of  Bipolar affective disorder, currently manic, severe, with psychotic features (HCC) Available alternative community resources still do not meet the patient's mental health care needs. I further attest that an established written individualized plan of care has been updated and is outlined in the patient's medical records.

## 2025-05-21 NOTE — ASSESSMENT & PLAN NOTE
With increased anxiety and depression related to stress at home  Not on antidepressants prehospital  Management per psychiatry team

## 2025-05-21 NOTE — TREATMENT PLAN
TREATMENT PLAN REVIEW - Behavioral Health Evonne Seth 24 y.o. 2000 female MRN: [id number]    [facility] QU IP BEHAVIORAL HLTH Room / Bed: [facility] 205/[facility] 205-01 Encounter: [id number]          Admit Date/Time:  5/20/2025  2:50 PM    Treatment Team:   MD Cliff Michael, DONNA Bryant RN Nikolas Feinsilber Erica Leader Sierra Negrete    Diagnosis: Principal Problem:    Bipolar affective disorder, currently manic, severe, with psychotic features ([facility])  Active Problems:    MDD (major depressive disorder)    Medical clearance for psychiatric admission    Cannabis use disorder      Patient Strengths/Assets: ability for insight, cooperative, motivation for treatment/growth    Patient Barriers/Limitations: chronic mental illness, difficulty adapting, limited support system, poor self-care    Short Term Goals: decrease in depressive symptoms, decrease in anxiety symptoms, decrease in paranoid thoughts, decrease in psychotic symptoms, decrease in suicidal thoughts, decrease in self abusive behaviors, improvement in insight, improvement in self care, sleep improvement, improvement in appetite, mood stabilization    Long Term Goals: improvement in depression, improvement in anxiety, resolution of depressive symptoms, resolution of manic symptoms, stabilization of mood, free of suicidal thoughts, improved insight, acceptance of need for psychiatric medications, acceptance of need for psychiatric treatment, adequate self care, adequate sleep, adequate appetite    Progress Towards Goals: starting psychiatric medications as prescribed, improving gradually, attends groups, mood is stabilizing, less anxious, less labile    Recommended Treatment: medication management, patient medication education, group therapy, milieu therapy,  continued Behavioral Health psychiatric evaluation/assessment process    Treatment Frequency: daily medication monitoring, group and milieu therapy daily, monitoring through interdisciplinary rounds, monitoring through weekly patient care conferences    Expected Discharge Date:  12-14 days    Discharge Plan: referral for outpatient drug and alcohol counseling, referral for outpatient medication management with a psychiatrist, referral for outpatient psychotherapy, referrals as indicated    Treatment Plan Created/Updated By: Carey Mcnally MD

## 2025-05-21 NOTE — CONSULTS
Consultation - Hospitalist   Name: Evonne Seth 24 y.o. female I MRN: [id number]  Unit/Bed#: -01 I Date of Admission: 5/20/2025   Date of Service: 5/21/2025 I Hospital Day: 1   Inpatient consult for Medical Clearance for  patient  Consult performed by: Chente Mejia PA-C  Consult ordered by: Nayan Hunter PA-C        Physician Requesting Evaluation: Carey Acevedo*   Reason for Evaluation / Principal Problem: MC    Assessment & Plan  MDD (major depressive disorder)  With increased anxiety and depression related to stress at home  Not on antidepressants prehospital  Management per psychiatry team  Medical clearance for psychiatric admission  Patient is medically stable to continue inpatient psychiatric treatment.  Contact IM with any questions or concerns.    Counseling / Coordination of Care Time: 30 minutes.  Greater than 50% of total time spent on patient counseling and coordination of care.      History of Present Illness:    Evonne Seth is a 24 y.o. female who is originally admitted to the Psychiatry service due to depression. We are consulted for medical clearance. Patient should continue all prior to admission medications as prescribed by primary care provider/outpatient specialists.   Available admission lab work and vitals are acceptable.  Patient feels a baseline physical health.  Patient appears medically stable for inpatient psychiatric treatment at this time. Please contact [facility] with any medical questions or concerns if issues should arise.     Review of Systems:    ROS unable to be preformed due to psychiatric disorder.    Past Medical and Surgical History:     Past Medical History:   Diagnosis Date    Anxiety     Asthma     Depression        No past surgical history on file.    Meds/Allergies:    all medications and allergies reviewed    Allergies: Allergies[1]    Social History:     Marital Status: Single    Substance Use History:   Social History     Substance and  "Sexual Activity   Alcohol Use Not Currently    Comment: socially     Tobacco Use History[2]  Social History     Substance and Sexual Activity   Drug Use Yes    Frequency: 1.0 times per week    Types: Marijuana    Comment: last used 4/28       Family History:    non-contributory    Physical Exam:     Vitals:   Blood Pressure: 120/78 (05/21/25 0729)  Pulse: 92 (05/21/25 0729)  Temperature: (!) 96.4 °F (35.8 °C) (05/21/25 0729)  Temp Source: Tympanic (05/21/25 0729)  Respirations: 18 (05/21/25 0729)  Height: 5' 5\" (165.1 cm) (05/20/25 1500)  Weight - Scale: 53.1 kg (117 lb) (05/20/25 1500)  SpO2: 100 % (05/21/25 0729)    Physical Exam  Vitals and nursing note reviewed.   Constitutional:       General: She is not in acute distress.     Appearance: Normal appearance.   HENT:      Head: Normocephalic.      Nose: Nose normal.      Mouth/Throat:      Mouth: Mucous membranes are moist.      Pharynx: Oropharynx is clear.     Eyes:      General: No scleral icterus.     Extraocular Movements: Extraocular movements intact.      Conjunctiva/sclera: Conjunctivae normal.      Pupils: Pupils are equal, round, and reactive to light.       Cardiovascular:      Rate and Rhythm: Normal rate.      Heart sounds: No murmur heard.     No friction rub. No gallop.   Pulmonary:      Effort: Pulmonary effort is normal. No respiratory distress.      Breath sounds: Normal breath sounds. No stridor. No wheezing, rhonchi or rales.   Abdominal:      General: Bowel sounds are normal. There is no distension.      Palpations: Abdomen is soft.      Tenderness: There is no abdominal tenderness. There is no guarding.     Musculoskeletal:         General: No deformity. Normal range of motion.      Cervical back: Neck supple.      Right lower leg: No edema.      Left lower leg: No edema.     Skin:     General: Skin is warm and dry.      Coloration: Skin is not jaundiced.     Neurological:      General: No focal deficit present.      Mental Status: She is " "alert and oriented to person, place, and time. Mental status is at baseline.      Cranial Nerves: Cranial nerves 2-12 are intact. No cranial nerve deficit.           Additional Data:     Lab Results: I have personally reviewed pertinent reports.                      No results found for: \"HGBA1C\"            Imaging: I have personally reviewed pertinent reports.      No orders to display       EKG, Pathology, and Other Studies Reviewed on Admission:   Prior pertinent studies and records reviewed in [facility]/Care Everywhere.    ** Please Note: This note has been constructed using a voice recognition system. **       [1] No Known Allergies  [2]   Social History  Tobacco Use   Smoking Status Some Days    Current packs/day: 0.25    Average packs/day: 0.3 packs/day for 2.4 years (0.6 ttl pk-yrs)    Types: Cigarettes    Start date: 12/29/2022   Smokeless Tobacco Never     "
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
## 2025-05-21 NOTE — NURSING NOTE
Per pt request, 72 hour notice signed @ 2000. Pt educated on parameters for discharge as well as the possibility of involuntary commitment should pt refuse to rescind. Pt verbalized understanding.

## 2025-05-22 PROBLEM — E43 SEVERE PROTEIN-CALORIE MALNUTRITION (HCC): Status: ACTIVE | Noted: 2025-05-22

## 2025-05-22 LAB — TREPONEMA PALLIDUM IGG+IGM AB [PRESENCE] IN SERUM OR PLASMA BY IMMUNOASSAY: NORMAL

## 2025-05-22 PROCEDURE — 99232 SBSQ HOSP IP/OBS MODERATE 35: CPT | Performed by: STUDENT IN AN ORGANIZED HEALTH CARE EDUCATION/TRAINING PROGRAM

## 2025-05-22 RX ORDER — NICOTINE 21 MG/24HR
1 PATCH, TRANSDERMAL 24 HOURS TRANSDERMAL DAILY
Status: DISCONTINUED | OUTPATIENT
Start: 2025-05-22 | End: 2025-05-25

## 2025-05-22 RX ORDER — GABAPENTIN 100 MG/1
100 CAPSULE ORAL 3 TIMES DAILY
Status: DISCONTINUED | OUTPATIENT
Start: 2025-05-22 | End: 2025-05-23

## 2025-05-22 RX ADMIN — PROPRANOLOL HYDROCHLORIDE 10 MG: 10 TABLET ORAL at 11:00

## 2025-05-22 RX ADMIN — NICOTINE POLACRILEX 2 MG: 4 GUM, CHEWING BUCCAL at 08:15

## 2025-05-22 RX ADMIN — OLANZAPINE 5 MG: 5 TABLET, FILM COATED ORAL at 21:56

## 2025-05-22 RX ADMIN — NICOTINE POLACRILEX 2 MG: 4 GUM, CHEWING BUCCAL at 18:05

## 2025-05-22 RX ADMIN — GABAPENTIN 100 MG: 100 CAPSULE ORAL at 15:56

## 2025-05-22 RX ADMIN — GABAPENTIN 100 MG: 100 CAPSULE ORAL at 21:56

## 2025-05-22 RX ADMIN — NICOTINE 1 PATCH: 21 PATCH, EXTENDED RELEASE TRANSDERMAL at 11:03

## 2025-05-22 RX ADMIN — NICOTINE POLACRILEX 2 MG: 4 GUM, CHEWING BUCCAL at 12:24

## 2025-05-22 RX ADMIN — TRAZODONE HYDROCHLORIDE 50 MG: 50 TABLET ORAL at 23:05

## 2025-05-22 RX ADMIN — GABAPENTIN 100 MG: 100 CAPSULE ORAL at 11:04

## 2025-05-22 RX ADMIN — LITHIUM CARBONATE 450 MG: 450 TABLET, EXTENDED RELEASE ORAL at 21:56

## 2025-05-22 RX ADMIN — NICOTINE POLACRILEX 2 MG: 4 GUM, CHEWING BUCCAL at 10:33

## 2025-05-22 RX ADMIN — NICOTINE POLACRILEX 2 MG: 4 GUM, CHEWING BUCCAL at 21:56

## 2025-05-22 NOTE — NURSING NOTE
"F/u inderal. Eric 27. On reassessment she reports it was \"a little helpful.\" States she is concerned that she is on so many medications at this time.   "
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
## 2025-05-22 NOTE — CASE MANAGEMENT
CM called [place] [employer] @[phone] to inform Officer Pete of the 303 hearing for tomorrow. CM spoke with another officer who will pass the information along and have him contact the CM. CM received a call back from Officer Pete @[phone]. He confirmed his availability for the hearing.    CM submitted 303 hearing request to [facility].     CM met with the Pt to review her 303 hearing rights. Pt verbalized understanding and stated that she is agreeable to treatment. She stated that she is concerned about her finances and paying for her rent. She stated that she has paid her rent two weeks ahead so she should be good for a little bit but not all 20 days. CM stated that the CM can assist with helping her pay her rent if she needs to. She verbalized understanding.

## 2025-05-22 NOTE — CASE MANAGEMENT
Readmit score:  21(yellow)   Treatment Plan:   Pt declined to meet with treatment team and reviewed treatment plan with CM.    Confirmed Address:    [facility]: [address] [place] [id number]    [place]   Resides in the home with:   Lives with her 7 y.o. son Tiesha, he is between her mom and his dad   Will Return Home at Discharge:   Returning   Confirmed Phone Number:   [phone]   Confirmed Email Address:   [email or web address]    Marital Status:  Children: Single  1- son   Family/Social Supports:    History of Mental Health:  Step-mom       Mom is diagnosed with bipolar   Commitment Status:    Status Changes:  302   Admitted from:   [facility] ED on 5/19/2025   Presenting C/O:         Pt stated that she her mom triggered her and she tried to provide to her mom that she could be crazy. She stated that she showed her mom that she wanted to die. She stated that she went to the hospital three times prior to admission. She stated her mom and her get each other upset.      Past Inpatient Tx:   None   Past Suicide Attempts:   Thoughts but no attempts    Current outpatient:    Psychiatrist:   Interested    Therapist:   Interested    ACT/ICM/CPS/WRT/SC:   N/a   PCP:   None   Med Hx/Concern:   Asthma   Medications:   None   [facility]   Spirituality/Mu-ism:   Jain   Education:   12th grade   Work/Income:   [employer]  but is unsure if she is fired or not   Legal:     Probation/[place] Ofc: None   Access to Firearms:   None   Transportation:   Drives herself, mom has the car and keys   Strength:   Hard worker and dedicated   Coping Skills:   Want to learn them    Goal:   Learn coping skills and get medication   Referrals Needed:     [facility]   Transport at Discharge:   Needs a ride   Emergency Contact:    aSra (step-mom) [phone]   ROIs obtained:     Step mom   Insurance:    Cigna   IMM:  N/a   Audit: 0 PAWSS:  0 BAT/Ethanol: 0 UDS: THC   Substance Abuse: Freq. Amount  Last Use Notes:   Heroin    Denying use   Amp/Meth    Denying use   Alcohol    Denying use   Cocaine    Sober    Cannabis Daily Unknown Prior to admission Smokes   Benzodiazepine    Denying use   Barbituartes    Denying use   Other    Denying use   Tobacco Daily Vapes Current

## 2025-05-22 NOTE — NURSING NOTE
Still awake at the onset of the shift. Requested & received Trazodone 50 mg po prn/sleep at 2354. Good effect obtained, as observed asleep within the hr & remained asleep at present.

## 2025-05-22 NOTE — NURSING NOTE
"Pt reports sleeping well overnight. Verbalized \"I was even able to fall back asleep\". Pt bright and smiling during interaction. States \"I have to regulate my emotions today. I see the  tomorrow\". Pt hopeful to not get 303'd. Denies SI/HI or hallucination. Missing son. Pt rambling during interaction. On the phones frequently with family. Pt hopeful for future.  "

## 2025-05-22 NOTE — PROGRESS NOTES
05/22/25 0830   Team Meeting   Meeting Type Daily Rounds   Team Members Present   Team Members Present Physician;Nurse;;Other (Discipline and Name)   Physician Team Member Dr. Mcnally / YOLANDA Hunter / YOLANDA Perales / PA Student   Nursing Team Member Rama / Aakash   Care Management Team Member Shani / Ying / Cody   Other (Discipline and Name) Vito (Group Facilitator)   Patient/Family Present   Patient Present No   Patient's Family Present No     Treatment Team Rounds Completed  Medical and Psychiatric Review Completed  D/C: disorganized, upset with mom, blaming others, social, 303 hearing for tomorrow
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
## 2025-05-22 NOTE — PLAN OF CARE
Problem: PAIN - ADULT  Goal: Verbalizes/displays adequate comfort level or baseline comfort level  Description: Interventions:  - Encourage patient to monitor pain and request assistance  - Assess pain using appropriate pain scale  - Administer analgesics as ordered based on type and severity of pain and evaluate response  - Implement non-pharmacological measures as appropriate and evaluate response  - Consider cultural and social influences on pain and pain management  - Notify physician/advanced practitioner if interventions unsuccessful or patient reports new pain  - Educate patient/family on pain management process including their role and importance of  reporting pain   - Provide non-pharmacologic/complimentary pain relief interventions  Outcome: Progressing     Problem: DISCHARGE PLANNING  Goal: Discharge to home or other facility with appropriate resources  Description: INTERVENTIONS:  - Identify barriers to discharge w/patient and caregiver  - Arrange for needed discharge resources and transportation as appropriate  - Identify discharge learning needs (meds, wound care, etc.)  - Arrange for interpretive services to assist at discharge as needed  - Refer to Case Management Department for coordinating discharge planning if the patient needs post-hospital services based on physician/advanced practitioner order or complex needs related to functional status, cognitive ability, or social support system  Outcome: Progressing     Problem: Knowledge Deficit  Goal: Patient/family/caregiver demonstrates understanding of disease process, treatment plan, medications, and discharge instructions  Description: Complete learning assessment and assess knowledge base.  Interventions:  - Provide teaching at level of understanding  - Provide teaching via preferred learning methods  Outcome: Progressing     Problem: SELF HARM/SUICIDALITY  Goal: Will have no self-injury during hospital stay  Description: INTERVENTIONS:  - Q 15  MINUTES: Routine safety checks  - Q WAKING SHIFT & PRN: Assess risk to determine if routine checks are adequate to maintain patient safety  - Encourage patient to participate actively in care by formulating a plan to combat response to suicidal ideation, identify supports and resources  Outcome: Progressing     Problem: DEPRESSION  Goal: Will be euthymic at discharge  Description: INTERVENTIONS:  - Administer medication as ordered  - Provide emotional support via 1:1 interaction with staff  - Encourage involvement in milieu/groups/activities  - Monitor for social isolation  Outcome: Progressing     Problem: ANXIETY  Goal: Will report anxiety at manageable levels  Description: INTERVENTIONS:  - Administer medication as ordered  - Teach and encourage coping skills  - Provide emotional support  - Assess patient/family for anxiety and ability to cope  Outcome: Progressing  Goal: By discharge: Patient will verbalize 2 strategies to deal with anxiety  Description: Interventions:  - Identify any obvious source/trigger to anxiety  - Staff will assist patient in applying identified coping technique/skills  - Encourage attendance of scheduled groups and activities  Outcome: Not Progressing     Problem: ANXIETY  Goal: Will report anxiety at manageable levels  Description: INTERVENTIONS:  - Administer medication as ordered  - Teach and encourage coping skills  - Provide emotional support  - Assess patient/family for anxiety and ability to cope  Outcome: Progressing  Goal: By discharge: Patient will verbalize 2 strategies to deal with anxiety  Description: Interventions:  - Identify any obvious source/trigger to anxiety  - Staff will assist patient in applying identified coping technique/skills  - Encourage attendance of scheduled groups and activities  Outcome: Not Progressing     Problem: INVOLUNTARY ADMIT  Goal: Will cooperate with staff recommendations and doctor's orders and will demonstrate appropriate  behavior  Description: INTERVENTIONS:  - Treat underlying conditions and offer medication as ordered  - Educate regarding involuntary admission procedures and rules  - Utilize positive consistent limit setting strategies to support patient and staff safety  Outcome: Progressing
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
## 2025-05-22 NOTE — PROGRESS NOTES
05/22/25 0900   Team Meeting   Meeting Type Tx Team Meeting   Initial Conference Date 05/22/25   Next Conference Date 06/19/25   Team Members Present   Team Members Present Physician;;Nurse   Physician Team Member Dr. Mcnally   Nursing Team Member Rama   Care Management Team Member Shani   Patient/Family Present   Patient Present No  (Pt declined to meet with treatment team and reviewed with CM during intake.)   Patient's Family Present No     Reviewed diagnosis of bipolar affective disorder, currently manic, severe, with psychotic features.  Discussed short term goals of decrease in depressive symptoms, decrease in anxiety symptoms, decrease in paranoid thoughts, decrease in psychotic symptoms, decrease in suicidal thoughts, decrease in self abusive behaviors, improvement in insight, improvement in self care, sleep improvement, improvement in appetite, mood stabilization.  Tentative discharge for 6/6.  All parties are in agreement and treatment plan was signed.

## 2025-05-22 NOTE — PLAN OF CARE
Problem: DISCHARGE PLANNING  Goal: Discharge to home or other facility with appropriate resources  Description: INTERVENTIONS:  - Identify barriers to discharge w/patient and caregiver  - Arrange for needed discharge resources and transportation as appropriate  - Identify discharge learning needs (meds, wound care, etc.)  - Arrange for interpretive services to assist at discharge as needed  - Refer to Case Management Department for coordinating discharge planning if the patient needs post-hospital services based on physician/advanced practitioner order or complex needs related to functional status, cognitive ability, or social support system  Outcome: Progressing  Note: Pt met with the CM to review and sign ROIs and complete intake. Pt read and signed treatment plan.

## 2025-05-22 NOTE — ASSESSMENT & PLAN NOTE
Malnutrition Findings:   Adult Malnutrition type: Chronic illness  Adult Degree of Malnutrition: Other severe protein calorie malnutrition                     360 Statement: Pt presents with severe protein calorie malnutrition as evidenced by <75% po intake > 1 month and 9% wt loss in 5 months (5/21/25 117 lbs, 12/21/24 128 lbs). Treat with diet and supplement 1x daily.    BMI Findings:           Body mass index is 19.47 kg/m².

## 2025-05-23 LAB
ATRIAL RATE: 76 BPM
P AXIS: 11 DEGREES
PR INTERVAL: 156 MS
QRS AXIS: 89 DEGREES
QRSD INTERVAL: 86 MS
QT INTERVAL: 404 MS
QTC INTERVAL: 454 MS
T WAVE AXIS: 11 DEGREES
VENTRICULAR RATE: 76 BPM

## 2025-05-23 PROCEDURE — 93010 ELECTROCARDIOGRAM REPORT: CPT | Performed by: INTERNAL MEDICINE

## 2025-05-23 PROCEDURE — 99232 SBSQ HOSP IP/OBS MODERATE 35: CPT | Performed by: STUDENT IN AN ORGANIZED HEALTH CARE EDUCATION/TRAINING PROGRAM

## 2025-05-23 RX ORDER — GABAPENTIN 300 MG/1
300 CAPSULE ORAL 3 TIMES DAILY
Status: DISCONTINUED | OUTPATIENT
Start: 2025-05-23 | End: 2025-05-30 | Stop reason: HOSPADM

## 2025-05-23 RX ADMIN — NICOTINE POLACRILEX 2 MG: 4 GUM, CHEWING BUCCAL at 07:54

## 2025-05-23 RX ADMIN — NICOTINE 1 PATCH: 21 PATCH, EXTENDED RELEASE TRANSDERMAL at 08:00

## 2025-05-23 RX ADMIN — NICOTINE POLACRILEX 2 MG: 4 GUM, CHEWING BUCCAL at 15:26

## 2025-05-23 RX ADMIN — GABAPENTIN 300 MG: 300 CAPSULE ORAL at 15:14

## 2025-05-23 RX ADMIN — HYDROXYZINE HYDROCHLORIDE 100 MG: 50 TABLET, FILM COATED ORAL at 09:45

## 2025-05-23 RX ADMIN — PROPRANOLOL HYDROCHLORIDE 10 MG: 10 TABLET ORAL at 18:05

## 2025-05-23 RX ADMIN — NICOTINE POLACRILEX 2 MG: 4 GUM, CHEWING BUCCAL at 18:07

## 2025-05-23 RX ADMIN — NICOTINE POLACRILEX 2 MG: 4 GUM, CHEWING BUCCAL at 12:45

## 2025-05-23 RX ADMIN — LITHIUM CARBONATE 450 MG: 450 TABLET, EXTENDED RELEASE ORAL at 21:55

## 2025-05-23 RX ADMIN — NICOTINE POLACRILEX 2 MG: 4 GUM, CHEWING BUCCAL at 09:54

## 2025-05-23 RX ADMIN — NICOTINE POLACRILEX 2 MG: 4 GUM, CHEWING BUCCAL at 20:13

## 2025-05-23 RX ADMIN — GABAPENTIN 300 MG: 300 CAPSULE ORAL at 21:55

## 2025-05-23 RX ADMIN — GABAPENTIN 100 MG: 100 CAPSULE ORAL at 08:00

## 2025-05-23 RX ADMIN — OLANZAPINE 5 MG: 5 TABLET, FILM COATED ORAL at 21:55

## 2025-05-23 RX ADMIN — TRAZODONE HYDROCHLORIDE 50 MG: 50 TABLET ORAL at 21:55
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
## 2025-05-23 NOTE — NURSING NOTE
At 18:05 RN administered Propanolol 10 mg PO for akathisia. Upon follow-up, patient endorses medication effectiveness.

## 2025-05-23 NOTE — NURSING NOTE
Visible on unit, social with peers, attends group. Reports dissatisfaction with various staff members, RN provided active listening and support. Endorses ongoing anxiety related to hospital stay and being away from her son. Denies SI/HI/AVH. Demonstrates future-oriented thinking, is hopeful to eventually become a . Implemented positive coping skills including journaling. RN provided patient with copy of medication list at patient's request. Plan of care ongoing. Denies unmet needs.
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
## 2025-05-23 NOTE — NURSING NOTE
AT 09:45 RN administered Atarax 100 mg PO for severe anxiety related to court hearting (Eric= 27). Patient states she hopes she does not have to stay at the the hospital for 20 days. Upon follow-up, endorses medication ineffectiveness. Patient reports feeling restless and uneasy, will try Propanolol later.

## 2025-05-23 NOTE — CASE MANAGEMENT
303 hearing held today through [facility]. Mental Health Review Officer/ present: Cole Osman; /s present: Alex Guallpa and Didier Vazquez; 302 petitioner not present due to Pt's agreement to treatment; CM and attending provider present; Pt agreeable to treatment.   Involved parties sworn in, testimonies given by involved parties, and officer/ to sign order for up to 20 days. 303 to  on 2025.
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
## 2025-05-23 NOTE — NURSING NOTE
"Pt appraoched while in hallway for evening assessment. Pt stated she is \"feeling better today\". Pt stated she spoke with her family on the phone which made her feel calmer about her stay on the unit.     Pt denies SI/HI/AVH. Pt stated that she slept well last night with no documented sleep disturbance. Pt states her appetite is improving. Pt stated she had no questions or concerns for staff.     Pt will continue to monitored throughout shift.   "

## 2025-05-23 NOTE — NURSING NOTE
Pt approached staff for PRN related to insomnia. PT given trazodone 50 mg at 2305 for insomnia.     Upon reassessment the pt is sleeping in bed with unlabored breathing. Medication seems to have been effective.
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
## 2025-05-23 NOTE — PLAN OF CARE
Problem: PAIN - ADULT  Goal: Verbalizes/displays adequate comfort level or baseline comfort level  Description: Interventions:  - Encourage patient to monitor pain and request assistance  - Assess pain using appropriate pain scale  - Administer analgesics as ordered based on type and severity of pain and evaluate response  - Implement non-pharmacological measures as appropriate and evaluate response  - Consider cultural and social influences on pain and pain management  - Notify physician/advanced practitioner if interventions unsuccessful or patient reports new pain  - Educate patient/family on pain management process including their role and importance of  reporting pain   - Provide non-pharmacologic/complimentary pain relief interventions  Outcome: Progressing     Problem: DISCHARGE PLANNING  Goal: Discharge to home or other facility with appropriate resources  Description: INTERVENTIONS:  - Identify barriers to discharge w/patient and caregiver  - Arrange for needed discharge resources and transportation as appropriate  - Identify discharge learning needs (meds, wound care, etc.)  - Arrange for interpretive services to assist at discharge as needed  - Refer to Case Management Department for coordinating discharge planning if the patient needs post-hospital services based on physician/advanced practitioner order or complex needs related to functional status, cognitive ability, or social support system  Outcome: Progressing     Problem: Knowledge Deficit  Goal: Patient/family/caregiver demonstrates understanding of disease process, treatment plan, medications, and discharge instructions  Description: Complete learning assessment and assess knowledge base.  Interventions:  - Provide teaching at level of understanding  - Provide teaching via preferred learning methods  Outcome: Progressing     Problem: SELF HARM/SUICIDALITY  Goal: Will have no self-injury during hospital stay  Description: INTERVENTIONS:  - Q 15  MINUTES: Routine safety checks  - Q WAKING SHIFT & PRN: Assess risk to determine if routine checks are adequate to maintain patient safety  - Encourage patient to participate actively in care by formulating a plan to combat response to suicidal ideation, identify supports and resources  Outcome: Progressing     Problem: DEPRESSION  Goal: Will be euthymic at discharge  Description: INTERVENTIONS:  - Administer medication as ordered  - Provide emotional support via 1:1 interaction with staff  - Encourage involvement in milieu/groups/activities  - Monitor for social isolation  Outcome: Progressing     Problem: ANXIETY  Goal: Will report anxiety at manageable levels  Description: INTERVENTIONS:  - Administer medication as ordered  - Teach and encourage coping skills  - Provide emotional support  - Assess patient/family for anxiety and ability to cope  Outcome: Progressing  Goal: By discharge: Patient will verbalize 2 strategies to deal with anxiety  Description: Interventions:  - Identify any obvious source/trigger to anxiety  - Staff will assist patient in applying identified coping technique/skills  - Encourage attendance of scheduled groups and activities  Outcome: Progressing     Problem: INVOLUNTARY ADMIT  Goal: Will cooperate with staff recommendations and doctor's orders and will demonstrate appropriate behavior  Description: INTERVENTIONS:  - Treat underlying conditions and offer medication as ordered  - Educate regarding involuntary admission procedures and rules  - Utilize positive consistent limit setting strategies to support patient and staff safety  Outcome: Progressing

## 2025-05-24 PROCEDURE — 99232 SBSQ HOSP IP/OBS MODERATE 35: CPT | Performed by: PSYCHIATRY & NEUROLOGY

## 2025-05-24 RX ADMIN — TRAZODONE HYDROCHLORIDE 50 MG: 50 TABLET ORAL at 22:22

## 2025-05-24 RX ADMIN — LITHIUM CARBONATE 450 MG: 450 TABLET, EXTENDED RELEASE ORAL at 22:22

## 2025-05-24 RX ADMIN — NICOTINE POLACRILEX 2 MG: 4 GUM, CHEWING BUCCAL at 08:00

## 2025-05-24 RX ADMIN — GABAPENTIN 300 MG: 300 CAPSULE ORAL at 15:12

## 2025-05-24 RX ADMIN — PROPRANOLOL HYDROCHLORIDE 10 MG: 10 TABLET ORAL at 12:06

## 2025-05-24 RX ADMIN — GABAPENTIN 300 MG: 300 CAPSULE ORAL at 08:00

## 2025-05-24 RX ADMIN — PROPRANOLOL HYDROCHLORIDE 10 MG: 10 TABLET ORAL at 20:13

## 2025-05-24 RX ADMIN — NICOTINE POLACRILEX 2 MG: 4 GUM, CHEWING BUCCAL at 20:13

## 2025-05-24 RX ADMIN — GABAPENTIN 300 MG: 300 CAPSULE ORAL at 22:22

## 2025-05-24 RX ADMIN — OLANZAPINE 7.5 MG: 5 TABLET, FILM COATED ORAL at 22:22

## 2025-05-24 RX ADMIN — NICOTINE POLACRILEX 2 MG: 4 GUM, CHEWING BUCCAL at 12:06

## 2025-05-24 RX ADMIN — NICOTINE 1 PATCH: 21 PATCH, EXTENDED RELEASE TRANSDERMAL at 08:01

## 2025-05-24 RX ADMIN — NICOTINE POLACRILEX 2 MG: 4 GUM, CHEWING BUCCAL at 15:12

## 2025-05-24 NOTE — TREATMENT TEAM
05/24/25 1000   Team Meeting   Meeting Type Daily Rounds   Team Members Present   Team Members Present Physician;Nurse   Physician Team Member ROSANNE Carlton   Nursing Team Member Ml   Patient/Family Present   Patient Present No   Patient's Family Present No     303 status (20 days) - anxious, restless, irritable and loud when staff redirected for sitting on floor, bright and social throughout day, declines groups.     Lithium level:  5/26 @ 2000.

## 2025-05-24 NOTE — NURSING NOTE
At 12:06 RN administered Propanolol 10 mg PO for akathisia. Upon follow-up, patient endorses medication effectiveness.
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
## 2025-05-24 NOTE — NURSING NOTE
Patient approached medication window and requested prn medication to facilitate sleep. Pt received trazodone 50 mg @2155. Upon follow up pt is still awake, intermittently walking around the unit with peers. Medication not effective at this time.

## 2025-05-24 NOTE — NURSING NOTE
Visible on unit, social with peers, and pleasant. Reports having a good day. Expresses anxiety reduction after receiving PRN Propanolol. Reports desire to improve her relationship with God, states she plans to take her son to [employer] and [employer] sermons. Denies SI/HI/AVH. Medication compliant. Utilizing PRN nicotine gum for nicotine withdrawal. Plan of care ongoing.
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
## 2025-05-25 PROCEDURE — 99232 SBSQ HOSP IP/OBS MODERATE 35: CPT | Performed by: PSYCHIATRY & NEUROLOGY

## 2025-05-25 RX ADMIN — NICOTINE POLACRILEX 2 MG: 4 GUM, CHEWING BUCCAL at 12:44

## 2025-05-25 RX ADMIN — LITHIUM CARBONATE 450 MG: 450 TABLET, EXTENDED RELEASE ORAL at 22:53

## 2025-05-25 RX ADMIN — GABAPENTIN 300 MG: 300 CAPSULE ORAL at 22:53

## 2025-05-25 RX ADMIN — TRAZODONE HYDROCHLORIDE 50 MG: 50 TABLET ORAL at 22:56

## 2025-05-25 RX ADMIN — GABAPENTIN 300 MG: 300 CAPSULE ORAL at 16:14

## 2025-05-25 RX ADMIN — NICOTINE POLACRILEX 2 MG: 4 GUM, CHEWING BUCCAL at 21:35

## 2025-05-25 RX ADMIN — OLANZAPINE 7.5 MG: 5 TABLET, FILM COATED ORAL at 22:53

## 2025-05-25 RX ADMIN — NICOTINE POLACRILEX 2 MG: 4 GUM, CHEWING BUCCAL at 16:49

## 2025-05-25 RX ADMIN — PROPRANOLOL HYDROCHLORIDE 10 MG: 10 TABLET ORAL at 12:33

## 2025-05-25 RX ADMIN — NICOTINE POLACRILEX 2 MG: 4 GUM, CHEWING BUCCAL at 09:32

## 2025-05-25 RX ADMIN — GABAPENTIN 300 MG: 300 CAPSULE ORAL at 09:32

## 2025-05-25 NOTE — NURSING NOTE
Belongings dropped off by pt's mother:    Storage:    Tampons  Gray/pink PJ  2 gray leggings  Black leggings  Pink/blue/black shirt  2 jeans  Gray, red tanks  Black sweatpants w/ strings      At Nurse's Station:     Mousse  Softee herbal rachelle gel/oil  Champagne Toast body spray  VO5 Conditioner  Deep Moisture Shampoo  Deodorant  Body lotion w/ pump    At bedside:    Brown brush  Blue comb  Blue bra  Black bra  Black sweatpants  Black leggings   Looney Tunes shirt (blue)  Looney Tunes shirt (gray)  Stitch shirt (gray)  Black flip-flops

## 2025-05-25 NOTE — PROGRESS NOTES
05/25/25 1030   Activity/Group Checklist   Group Impromptu group (Comment)  (Discussion with BANEGAS/L about trauma reactions and coping techniques to challenge concrete thought patterns and expressing emotions in healthy ways. Also discussed mental health stigma and the importance of treatment and therapy.)   Attendance Attended   Attendance Duration (min) 16-30   Interactions Interacted appropriately   Affect/Mood Appropriate;Bright   Goals Achieved Identified feelings;Identified triggers;Discussed coping strategies;Discussed self-esteem issues;Identified distorted thoughts/beliefs;Able to reflect/comment on own behavior;Able to manage/cope with feelings;Verbalized increased hopefulness;Able to self-disclose;Able to recieve feedback;Able to give feedback to another
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
## 2025-05-25 NOTE — NURSING NOTE
Pt has been visible and social throughout the day, briefly irritable due to personal belongings taking too long to be inventoried. Pt later apologized. Reports ongoing anxiety, utilizes PRN Propanolol for same. Social with peers, attends some groups. Denies SI, HI, AVH.

## 2025-05-25 NOTE — NURSING NOTE
"Evonne expressed readiness for discharge, stated, \"I know I have to wait for medicine to be at the right level in my blood, but I'm hoping the doctor lets me go Tuesday or Wednesday.\" Patient reports missing her son, states, \"I know I was acting wild before I came in here, so CYS was already involved with my son, but he's staying with his dad and seven other kids, so it's a lot.\" Active listening provided. Evonne denies current SI/HI/AVH, remains compliant with prescribed medications, is social with peers and observed drawing and coloring.   "

## 2025-05-25 NOTE — NURSING NOTE
Pt received Inderal 10 mg PO per PRN orders for restlessness. Upon follow-up, pt lying in bed comfortably napping. Pt reports medication effect.

## 2025-05-25 NOTE — NURSING NOTE
Evonne requested and was given PRN Trazodone 50 mg for sleep. Upon follow-up for reassessment, patient is observed drawing in her room. PRN appears to have been ineffective at this time.

## 2025-05-25 NOTE — NURSING NOTE
"Evonne requested and was given PRN Propanolol 10 mg for feelings of restlessness, states, \"I don't like the weekends here. There's not enough to do.\" This writer offered word searches, crossword puzzles, coloring pages, board games and/or a book, patient declined. Upon follow-up for reassessment, patient is observed socializing and laughing with peers. PRN appears to have been effective.   "
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
## 2025-05-26 LAB — LITHIUM SERPL-SCNC: 0.17 MMOL/L (ref 0.6–1.2)

## 2025-05-26 PROCEDURE — 80178 ASSAY OF LITHIUM: CPT | Performed by: STUDENT IN AN ORGANIZED HEALTH CARE EDUCATION/TRAINING PROGRAM

## 2025-05-26 PROCEDURE — 99232 SBSQ HOSP IP/OBS MODERATE 35: CPT | Performed by: STUDENT IN AN ORGANIZED HEALTH CARE EDUCATION/TRAINING PROGRAM

## 2025-05-26 RX ADMIN — OLANZAPINE 7.5 MG: 5 TABLET, FILM COATED ORAL at 21:59

## 2025-05-26 RX ADMIN — GABAPENTIN 300 MG: 300 CAPSULE ORAL at 21:59

## 2025-05-26 RX ADMIN — TRAZODONE HYDROCHLORIDE 50 MG: 50 TABLET ORAL at 21:59

## 2025-05-26 RX ADMIN — NICOTINE POLACRILEX 2 MG: 4 GUM, CHEWING BUCCAL at 15:00

## 2025-05-26 RX ADMIN — GABAPENTIN 300 MG: 300 CAPSULE ORAL at 15:00

## 2025-05-26 RX ADMIN — GABAPENTIN 300 MG: 300 CAPSULE ORAL at 08:53

## 2025-05-26 RX ADMIN — NICOTINE POLACRILEX 2 MG: 4 GUM, CHEWING BUCCAL at 17:27

## 2025-05-26 RX ADMIN — NICOTINE POLACRILEX 2 MG: 4 GUM, CHEWING BUCCAL at 08:53

## 2025-05-26 RX ADMIN — NICOTINE POLACRILEX 2 MG: 4 GUM, CHEWING BUCCAL at 21:22

## 2025-05-26 RX ADMIN — LITHIUM CARBONATE 450 MG: 450 TABLET, EXTENDED RELEASE ORAL at 21:59

## 2025-05-26 NOTE — CASE MANAGEMENT
CM met with the Pt to discuss progress. Pt rambling throughout conversation. Pt stated that she is wondering if she will be discharged before June. Pt stated that she feels the weekend has been not as helpful to her due to other patients on the unit. She stated that she feels scared at times but is able to use her coping skills to calm herself down. She stated that she feels like she is ready to discharge. She stated that it is not due to her financial situation but is nervous about how she is going to pay her rent and bills due to not having a job anymore. Pt stated that she plans to apply for unemployment and would like to apply for MA. CM stated that they can print an MA application out for her and she can fill it out or when she discharges, she can apply online. Pt verbalized understanding and stated that she can fill out one if the CM prints it. Pt discussed her coping skills and how groups have been helpful and hoping to speak to her regular provider tomorrow about when she can discharge. CM stated that they can talk with the treatment team tomorrow about discharge plans. She stated that she knows she gets blood work tonight for her one medication. CM stated that the level will be a factor as to when she can discharge. Pt verbalized understanding.     CM called the Pt's yohana Ruiz @[phone] but unable to get a hold of her of leave a voicemail.
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
## 2025-05-26 NOTE — ASSESSMENT & PLAN NOTE
Malnutrition Findings:   Adult Malnutrition type: Chronic illness  Adult Degree of Malnutrition: Other severe protein calorie malnutrition                     360 Statement: Pt presents with severe protein calorie malnutrition as evidenced by <75% po intake > 1 month and 9% wt loss in 5 months (5/21/25 117 lbs, 12/21/24 128 lbs). Treat with diet and supplement 1x daily.    BMI Findings:           Body mass index is 19.7 kg/m².

## 2025-05-26 NOTE — NURSING NOTE
Pt rambling this morning about wanting to discharge tomorrow. Denies SI/HI or hallucination. Missing son. On the pt phone frequently. Briefly napping before lunch. Denies any question or concern at this time.
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
## 2025-05-26 NOTE — NURSING NOTE
"Evonne is calm upon approach, observed socializing with peers on the unit and using the phone frequently. Despite complaining about \"...not enough to do, not enough groups on the weekend,\" patient declined to attend evening wrap-up group, despite encouragement from staff. Evonne was redirected r/t poor physical boundaries with peers, frequent attempts to hug peers and braid their hair, touch their arms, trying to sit on the same seat as peer, etc. Patient denies current SI/HI/AVH, continues to express readiness for discharge. Evonne remains compliant with prescribed medications, denies questions and/or concerns at this time. Staff availability reinforced.  "

## 2025-05-26 NOTE — NURSING NOTE
"Patient tearful, reports \"I feel like being here is a punishment.\" Endorses mild depression related to length of hospital stay. Voices frustration that several of her peers are leaving tomorrow and exhibits poor insight into her reason for hospital stay. RN provided active listening and support. Denies SI/HI/AVH. Expresses satisfaction with medication regimen, has not utilized any PRN anxiety medication yet today. Observed walking hallways, social with peers. Made progress towards treatment goals as evidenced by positive group attendance. Plan of care ongoing.   "

## 2025-05-27 PROCEDURE — 99232 SBSQ HOSP IP/OBS MODERATE 35: CPT | Performed by: STUDENT IN AN ORGANIZED HEALTH CARE EDUCATION/TRAINING PROGRAM

## 2025-05-27 RX ORDER — LITHIUM CARBONATE 300 MG/1
600 TABLET, FILM COATED, EXTENDED RELEASE ORAL
Status: DISCONTINUED | OUTPATIENT
Start: 2025-05-27 | End: 2025-05-30 | Stop reason: HOSPADM

## 2025-05-27 RX ADMIN — GABAPENTIN 300 MG: 300 CAPSULE ORAL at 08:53

## 2025-05-27 RX ADMIN — NICOTINE POLACRILEX 2 MG: 4 GUM, CHEWING BUCCAL at 12:14

## 2025-05-27 RX ADMIN — LITHIUM CARBONATE 600 MG: 300 TABLET, EXTENDED RELEASE ORAL at 21:52

## 2025-05-27 RX ADMIN — TRAZODONE HYDROCHLORIDE 50 MG: 50 TABLET ORAL at 21:54

## 2025-05-27 RX ADMIN — NICOTINE POLACRILEX 2 MG: 4 GUM, CHEWING BUCCAL at 18:28

## 2025-05-27 RX ADMIN — GABAPENTIN 300 MG: 300 CAPSULE ORAL at 21:52

## 2025-05-27 RX ADMIN — OLANZAPINE 7.5 MG: 5 TABLET, FILM COATED ORAL at 21:52

## 2025-05-27 RX ADMIN — GABAPENTIN 300 MG: 300 CAPSULE ORAL at 16:42

## 2025-05-27 RX ADMIN — NICOTINE POLACRILEX 2 MG: 4 GUM, CHEWING BUCCAL at 20:55
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
## 2025-05-27 NOTE — PROGRESS NOTES
Progress Note - Behavioral Health   Name: Evonne Seth 24 y.o. female I MRN: [id number]  Unit/Bed#: -01 I Date of Admission: 5/20/2025   Date of Service: 5/27/2025 I Hospital Day: 7        Assessment & Plan  Bipolar affective disorder, currently manic, severe, with psychotic features (HCC)  Lithium level low at 0.17, increase lithium to 600 mg at bedtime tonight  Next level Friday morning 5/30/25 - 6 AM  Continue Zyprexa 7.5 mg daily at bedtime starting 5/24/2025 @ 2200  Continue gabapentin 300 mg 3 times daily    This is 24-year-old female with history of depression/anxiety/PTSD and cannabis use admitted to inpatient unit on 302 for disorganized behavior, suicidal ideations, trying to hit by traffic and asked the police to shoot her.  Improving mood, mood stability, thought process and behavior organization, insight.    303 upheld for 20 days 5/23  MDD (major depressive disorder)  Management as above  Medical clearance for psychiatric admission  Consult medicine  Cannabis use disorder  Counseling  Severe protein-calorie malnutrition (HCC)  Malnutrition Findings:   Adult Malnutrition type: Chronic illness  Adult Degree of Malnutrition: Other severe protein calorie malnutrition                     360 Statement: Pt presents with severe protein calorie malnutrition as evidenced by <75% po intake > 1 month and 9% wt loss in 5 months (5/21/25 117 lbs, 12/21/24 128 lbs). Treat with diet and supplement 1x daily.    BMI Findings:           Body mass index is 19.7 kg/m².        Current medications:    Current Facility-Administered Medications:     acetaminophen (TYLENOL) tablet 650 mg, Q6H PRN    acetaminophen (TYLENOL) tablet 650 mg, Q4H PRN    acetaminophen (TYLENOL) tablet 975 mg, Q6H PRN    aluminum-magnesium hydroxide-simethicone (MAALOX) oral suspension 30 mL, Q4H PRN    artificial tear ophthalmic ointment, 4x Daily PRN    haloperidol lactate (HALDOL) injection 2.5 mg, Q6H PRN Max 4/day **AND** LORazepam  (ATIVAN) injection 1 mg, Q6H PRN Max 4/day **AND** benztropine (COGENTIN) injection 0.5 mg, Q6H PRN Max 4/day    benztropine (COGENTIN) injection 1 mg, BID PRN    haloperidol lactate (HALDOL) injection 5 mg, Q4H PRN Max 4/day **AND** LORazepam (ATIVAN) injection 2 mg, Q4H PRN Max 4/day **AND** benztropine (COGENTIN) injection 1 mg, Q4H PRN Max 4/day    benztropine (COGENTIN) tablet 1 mg, BID PRN    bisacodyl (DULCOLAX) rectal suppository 10 mg, Daily PRN    hydrOXYzine HCL (ATARAX) tablet 50 mg, Q6H PRN Max 4/day **OR** diphenhydrAMINE (BENADRYL) injection 50 mg, Q6H PRN    gabapentin (NEURONTIN) capsule 300 mg, TID    haloperidol (HALDOL) tablet 2 mg, Q4H PRN Max 6/day    haloperidol (HALDOL) tablet 5 mg, Q6H PRN Max 4/day    haloperidol (HALDOL) tablet 5 mg, Q4H PRN Max 4/day    hydrOXYzine HCL (ATARAX) tablet 100 mg, Q6H PRN Max 4/day **OR** LORazepam (ATIVAN) injection 2 mg, Q6H PRN    hydrOXYzine HCL (ATARAX) tablet 25 mg, Q6H PRN Max 4/day    lithium carbonate (LITHOBID) CR tablet 600 mg, HS    magnesium hydroxide (MILK OF MAGNESIA) oral suspension 30 mL, Daily PRN    nicotine polacrilex (NICORETTE) gum 2 mg, Q2H PRN    OLANZapine (ZyPREXA) tablet 7.5 mg, HS    propranolol (INDERAL) tablet 10 mg, Q8H PRN    senna-docusate sodium (SENOKOT S) 8.6-50 mg per tablet 1 tablet, Daily PRN    traZODone (DESYREL) tablet 50 mg, HS PRN    Risks / Benefits of Treatment:    Risks, benefits, and possible side effects of medications explained to patient and patient verbalizes understanding and agreement for treatment.    Progress Toward Goals: progressing    Treatment Planning:  All current active medications have been reviewed.  Continue to monitor response to treatment and assess for potential side effects of medications.  Encourage group therapy, milieu therapy and occupational therapy  Collaboration with medical service for medical comorbidities as indicated.  Behavioral Health checks for safety monitoring.  Estimated  Discharge Day: 5/30/2025  Legal Status: 303    Subjective:    Behavior over the last 24 hours: unchanged    No acute behavioral events over the past 24 hours. Today, patient was seen and examined at bedside for continuation of care.     Patient was calm and cooperative with interview.  Patient reports improving mood, increased irritability.  She has been participating in group, milieu therapy.  Has been engaging in creative outlets such as drawing and writing in order to express her emotions.  She has been calm and cooperative and visibly improving in terms of organization of thought process and behavior.  Denies SI/HI/AVH.  We discussed that her lithium level is low and she is amenable to an increase in this medication.  We discussed tentative discharge plan for later this week.    Patient denied other new or worsening psychiatric symptoms/complaints at this time.    Sleep: unchanged  Appetite: unchanged  Medication side effects: none reported  ROS: review of systems as noted in HPI, all other systems are negative    Objective:    Vital signs in last 24 hours:    Temp:  [96.3 °F (35.7 °C)-98.6 °F (37 °C)] 96.3 °F (35.7 °C)  HR:  [] 68  BP: (108-128)/(66-84) 108/66  Resp:  [18] 18  SpO2:  [100 %] 100 %  O2 Device: None (Room air)    Mental Status Evaluation:    Appearance: casually dressed, consistent with stated age  Motor: no psychomotor retardation, no gait abnormalities  Behavior: cooperative, answers questions appropriately  Speech: soft, normal rhythm  Mood: Good  Affect: constricted, depressed-appearing  Thought Process: linear and goal-oriented  Thought Content: denies delusions  Risk Potential: denies suicidal ideation, plan, or intent. Denies homicidal ideation  Perceptions: denies auditory hallucinations, denies visual hallucinations,   Sensorium: Oriented to person, place, time, and situation  Cognition: cognitive ability appears intact but was not quantitatively tested  Consciousness: alert and  awake  Attention: intact, able to focus without difficulty  Insight: fair  Judgement: limited        Lab Results: I have reviewed the following results:  Recent Results (from the past 48 hours)   Lithium level    Collection Time: 05/26/25  8:05 PM   Result Value Ref Range    Lithium Lvl 0.17 (L) 0.60 - 1.20 mmol/L        Administrative Statements    Counseling / Coordination of Care:   Patients progress discussed with staff in treatment team meeting. Medication changes reviewed with staff in treatment team meeting.    Russ Ledbetter MD 05/27/25    This note has been constructed using a voice recognition system. There may be translation, syntax, or grammatical errors. If you have any questions, please contact the dictating provider.

## 2025-05-27 NOTE — NURSING NOTE
"Patient standing in bedroom doorway, tearful. States, \"I miss my son. I never been away from my son.\" Verbalizes preoccupation regarding length of hospital stay, states, \"I don't know why I need to be here. I haven't had any outbursts.\" RN and patient discussed patients behaviors PTA including insomnia and pertinence of medication compliance. RN educated patient on therapeutic Lithium Levels as patient's current level is subtherapeutic. Denies SI/HI/AVH. Made phone call in hallway prior to breakfast. Plan of care ongoing.  "

## 2025-05-27 NOTE — PROGRESS NOTES
05/27/25 0750   Team Meeting   Meeting Type Daily Rounds   Team Members Present   Team Members Present Physician;Nurse;;Other (Discipline and Name)   Physician Team Member Dr. Ledbetter / YOLANDA Hunter / Dr. Benson   Nursing Team Member Rama / Aakash   Care Management Team Member Shani / Ying / Cody / Rolo   Other (Discipline and Name) Vito (Group Facilitator)   Patient/Family Present   Patient Present No   Patient's Family Present No     Treatment Team Rounds Completed  Medical and Psychiatric Review Completed  D/C: rambling, wants to discharge, redirection for poor boundaries, tentative discharge for 5/30/2025

## 2025-05-27 NOTE — PLAN OF CARE
Problem: Knowledge Deficit  Goal: Patient/family/caregiver demonstrates understanding of disease process, treatment plan, medications, and discharge instructions  Description: Complete learning assessment and assess knowledge base.  Interventions:  - Provide teaching at level of understanding  - Provide teaching via preferred learning methods  Outcome: Progressing     Problem: SELF HARM/SUICIDALITY  Goal: Will have no self-injury during hospital stay  Description: INTERVENTIONS:  - Q 15 MINUTES: Routine safety checks  - Q WAKING SHIFT & PRN: Assess risk to determine if routine checks are adequate to maintain patient safety  - Encourage patient to participate actively in care by formulating a plan to combat response to suicidal ideation, identify supports and resources  Outcome: Progressing     Problem: DEPRESSION  Goal: Will be euthymic at discharge  Description: INTERVENTIONS:  - Administer medication as ordered  - Provide emotional support via 1:1 interaction with staff  - Encourage involvement in milieu/groups/activities  - Monitor for social isolation  Outcome: Progressing     Problem: ANXIETY  Goal: Will report anxiety at manageable levels  Description: INTERVENTIONS:  - Administer medication as ordered  - Teach and encourage coping skills  - Provide emotional support  - Assess patient/family for anxiety and ability to cope  Outcome: Progressing  Goal: By discharge: Patient will verbalize 2 strategies to deal with anxiety  Description: Interventions:  - Identify any obvious source/trigger to anxiety  - Staff will assist patient in applying identified coping technique/skills  - Encourage attendance of scheduled groups and activities  Outcome: Progressing     Problem: PAIN - ADULT  Goal: Verbalizes/displays adequate comfort level or baseline comfort level  Description: Interventions:  - Encourage patient to monitor pain and request assistance  - Assess pain using appropriate pain scale  - Administer analgesics  as ordered based on type and severity of pain and evaluate response  - Implement non-pharmacological measures as appropriate and evaluate response  - Consider cultural and social influences on pain and pain management  - Notify physician/advanced practitioner if interventions unsuccessful or patient reports new pain  - Educate patient/family on pain management process including their role and importance of  reporting pain   - Provide non-pharmacologic/complimentary pain relief interventions  Outcome: Completed     Problem: INVOLUNTARY ADMIT  Goal: Will cooperate with staff recommendations and doctor's orders and will demonstrate appropriate behavior  Description: INTERVENTIONS:  - Treat underlying conditions and offer medication as ordered  - Educate regarding involuntary admission procedures and rules  - Utilize positive consistent limit setting strategies to support patient and staff safety  Outcome: Completed

## 2025-05-27 NOTE — NURSING NOTE
Evonne was given Trazodone for sleep. At one hour follow up patient in bed, appears asleep with even and unlabored respirations. PRN effective.

## 2025-05-28 PROCEDURE — 99232 SBSQ HOSP IP/OBS MODERATE 35: CPT | Performed by: STUDENT IN AN ORGANIZED HEALTH CARE EDUCATION/TRAINING PROGRAM

## 2025-05-28 RX ADMIN — GABAPENTIN 300 MG: 300 CAPSULE ORAL at 21:26

## 2025-05-28 RX ADMIN — NICOTINE POLACRILEX 2 MG: 4 GUM, CHEWING BUCCAL at 18:00

## 2025-05-28 RX ADMIN — NICOTINE POLACRILEX 2 MG: 4 GUM, CHEWING BUCCAL at 12:37

## 2025-05-28 RX ADMIN — NICOTINE POLACRILEX 2 MG: 4 GUM, CHEWING BUCCAL at 09:01

## 2025-05-28 RX ADMIN — LITHIUM CARBONATE 600 MG: 300 TABLET, EXTENDED RELEASE ORAL at 21:26

## 2025-05-28 RX ADMIN — TRAZODONE HYDROCHLORIDE 50 MG: 50 TABLET ORAL at 21:25

## 2025-05-28 RX ADMIN — GABAPENTIN 300 MG: 300 CAPSULE ORAL at 08:59

## 2025-05-28 RX ADMIN — GABAPENTIN 300 MG: 300 CAPSULE ORAL at 15:44

## 2025-05-28 RX ADMIN — OLANZAPINE 7.5 MG: 5 TABLET, FILM COATED ORAL at 21:26

## 2025-05-28 NOTE — NURSING NOTE
At 21:54 RN administered Trazodone 50 mg PO for insomnia. Upon follow-up, medication appears effective as evidenced by patient calmly resting in bed in no signs of distress.

## 2025-05-28 NOTE — PROGRESS NOTES
05/28/25 0750   Team Meeting   Meeting Type Daily Rounds   Team Members Present   Team Members Present Physician;Nurse;;Other (Discipline and Name)   Physician Team Member Dr. Ledbetter / YOLANDA Hunter / ROSANNE Meredith / PA Student   Nursing Team Member Lauro / Aakash / Nursing Student   Care Management Team Member Shani / Ying / Cody   Other (Discipline and Name) Vito (Group Facilitator)   Patient/Family Present   Patient Present No   Patient's Family Present No     Treatment Team Rounds Completed  Medical and Psychiatric Review Completed  D/C: redirection for boundaries with other peers, denying all, focused on discharge, discharge for 5/30/2025

## 2025-05-28 NOTE — ASSESSMENT & PLAN NOTE
Lithium level low at 0.17, increased lithium to 600 mg at bedtime on 5/27/2025  Next level Friday morning 5/30/25 - 6 AM  Continue Zyprexa 7.5 mg daily at bedtime starting 5/24/2025 @ 2200  Continue gabapentin 300 mg 3 times daily    This is 24-year-old female with history of depression/anxiety/PTSD and cannabis use admitted to inpatient unit on 302 for disorganized behavior, suicidal ideations, trying to hit by traffic and asked the police to shoot her.  Improving mood, mood stability, thought process and behavior organization, insight.    303 upheld for 20 days 5/23

## 2025-05-28 NOTE — NURSING NOTE
Pt pleasant and cooperative during interaction. Denies SI/HI or hallucinations. Missing son and family. Pt forward thinking and positive about the future. Social with peers. On the pt phones frequently. Denies any question or concern at this time.

## 2025-05-28 NOTE — PROGRESS NOTES
Progress Note - Behavioral Health   Name: Evonne Seth 24 y.o. female I MRN: [id number]  Unit/Bed#: -01 I Date of Admission: 5/20/2025   Date of Service: 5/28/2025 I Hospital Day: 8        Assessment & Plan  Bipolar affective disorder, currently manic, severe, with psychotic features (HCC)  Lithium level low at 0.17, increased lithium to 600 mg at bedtime on 5/27/2025  Next level Friday morning 5/30/25 - 6 AM  Continue Zyprexa 7.5 mg daily at bedtime starting 5/24/2025 @ 2200  Continue gabapentin 300 mg 3 times daily    This is 24-year-old female with history of depression/anxiety/PTSD and cannabis use admitted to inpatient unit on 302 for disorganized behavior, suicidal ideations, trying to hit by traffic and asked the police to shoot her.  Improving mood, mood stability, thought process and behavior organization, insight.    303 upheld for 20 days 5/23  MDD (major depressive disorder)  Management as above  Medical clearance for psychiatric admission  Consult medicine  Cannabis use disorder  Counseling  Severe protein-calorie malnutrition (HCC)  Malnutrition Findings:   Adult Malnutrition type: Chronic illness  Adult Degree of Malnutrition: Other severe protein calorie malnutrition                     360 Statement: Pt presents with severe protein calorie malnutrition as evidenced by <75% po intake > 1 month and 9% wt loss in 5 months (5/21/25 117 lbs, 12/21/24 128 lbs). Treat with diet and supplement 1x daily.    BMI Findings:           Body mass index is 19.7 kg/m².        Current medications:    Current Facility-Administered Medications:     acetaminophen (TYLENOL) tablet 650 mg, Q6H PRN    acetaminophen (TYLENOL) tablet 650 mg, Q4H PRN    acetaminophen (TYLENOL) tablet 975 mg, Q6H PRN    aluminum-magnesium hydroxide-simethicone (MAALOX) oral suspension 30 mL, Q4H PRN    artificial tear ophthalmic ointment, 4x Daily PRN    haloperidol lactate (HALDOL) injection 2.5 mg, Q6H PRN Max 4/day **AND**  LORazepam (ATIVAN) injection 1 mg, Q6H PRN Max 4/day **AND** benztropine (COGENTIN) injection 0.5 mg, Q6H PRN Max 4/day    benztropine (COGENTIN) injection 1 mg, BID PRN    haloperidol lactate (HALDOL) injection 5 mg, Q4H PRN Max 4/day **AND** LORazepam (ATIVAN) injection 2 mg, Q4H PRN Max 4/day **AND** benztropine (COGENTIN) injection 1 mg, Q4H PRN Max 4/day    benztropine (COGENTIN) tablet 1 mg, BID PRN    bisacodyl (DULCOLAX) rectal suppository 10 mg, Daily PRN    hydrOXYzine HCL (ATARAX) tablet 50 mg, Q6H PRN Max 4/day **OR** diphenhydrAMINE (BENADRYL) injection 50 mg, Q6H PRN    gabapentin (NEURONTIN) capsule 300 mg, TID    haloperidol (HALDOL) tablet 2 mg, Q4H PRN Max 6/day    haloperidol (HALDOL) tablet 5 mg, Q6H PRN Max 4/day    haloperidol (HALDOL) tablet 5 mg, Q4H PRN Max 4/day    hydrOXYzine HCL (ATARAX) tablet 100 mg, Q6H PRN Max 4/day **OR** LORazepam (ATIVAN) injection 2 mg, Q6H PRN    hydrOXYzine HCL (ATARAX) tablet 25 mg, Q6H PRN Max 4/day    lithium carbonate (LITHOBID) CR tablet 600 mg, HS    magnesium hydroxide (MILK OF MAGNESIA) oral suspension 30 mL, Daily PRN    nicotine polacrilex (NICORETTE) gum 2 mg, Q2H PRN    OLANZapine (ZyPREXA) tablet 7.5 mg, HS    propranolol (INDERAL) tablet 10 mg, Q8H PRN    senna-docusate sodium (SENOKOT S) 8.6-50 mg per tablet 1 tablet, Daily PRN    traZODone (DESYREL) tablet 50 mg, HS PRN    Risks / Benefits of Treatment:    Risks, benefits, and possible side effects of medications explained to patient and patient verbalizes understanding and agreement for treatment.    Progress Toward Goals: progressing    Treatment Planning:  All current active medications have been reviewed.  Continue to monitor response to treatment and assess for potential side effects of medications.  Encourage group therapy, milieu therapy and occupational therapy  Collaboration with medical service for medical comorbidities as indicated.  Behavioral Health checks for safety  monitoring.  Estimated Discharge Day: 5/30/2025  Legal Status: 303    Subjective:    Behavior over the last 24 hours: unchanged    No acute behavioral events over the past 24 hours. Today, patient was seen and examined at bedside for continuation of care.     Patient was calm and cooperative with interview.  Patient reports that she continues to feel better on the unit.  Therapy and individual therapy working on journaling and talking about her emotions.  She reports increased mood stability and significant motivations for improving her mental health including her son whom she looks forward to seeing.  Denies any medication side effects and reports improving sleep and appetite.  Reports improving depressed mood.  Denies current SI/HI/AVH    Patient denied other new or worsening psychiatric symptoms/complaints at this time.    Sleep: unchanged  Appetite: unchanged  Medication side effects: none reported  ROS: review of systems as noted in HPI, all other systems are negative    Objective:    Vital signs in last 24 hours:    Temp:  [97 °F (36.1 °C)-97.8 °F (36.6 °C)] 97.8 °F (36.6 °C)  HR:  [73-75] 73  BP: ()/(62-67) 97/67  Resp:  [16-17] 16  SpO2:  [99 %-100 %] 99 %  O2 Device: None (Room air)    Mental Status Evaluation:    Appearance: casually dressed, consistent with stated age  Motor: no psychomotor retardation, no gait abnormalities  Behavior: cooperative, answers questions appropriately  Speech: soft, normal rhythm  Mood: Good  Affect: euthymic, normal range  Thought Process: linear and goal-oriented  Thought Content: denies delusions  Risk Potential: denies suicidal ideation, plan, or intent. Denies homicidal ideation  Perceptions: denies auditory hallucinations, denies visual hallucinations,   Sensorium: Oriented to person, place, time, and situation  Cognition: cognitive ability appears intact but was not quantitatively tested  Consciousness: alert and awake  Attention: intact, able to focus without  difficulty  Insight: fair  Judgement: limited        Lab Results: I have reviewed the following results:  Recent Results (from the past 48 hours)   Lithium level    Collection Time: 05/26/25  8:05 PM   Result Value Ref Range    Lithium Lvl 0.17 (L) 0.60 - 1.20 mmol/L        Administrative Statements    Counseling / Coordination of Care:   Patients progress discussed with staff in treatment team meeting. Medication changes reviewed with staff in treatment team meeting.    Russ Ledbetter MD 05/28/25    This note has been constructed using a voice recognition system. There may be translation, syntax, or grammatical errors. If you have any questions, please contact the dictating provider.

## 2025-05-28 NOTE — NURSING NOTE
Met with patient in room. Visible and social on the unit during the shift. Went to meals. Denies SI/HI/AH/VH. Misses her son and wants to DC soon. Aware that her medications need to be adjusted still.

## 2025-05-28 NOTE — PLAN OF CARE
Problem: DISCHARGE PLANNING  Goal: Discharge to home or other facility with appropriate resources  Description: INTERVENTIONS:  - Identify barriers to discharge w/patient and caregiver  - Arrange for needed discharge resources and transportation as appropriate  - Identify discharge learning needs (meds, wound care, etc.)  - Arrange for interpretive services to assist at discharge as needed  - Refer to Case Management Department for coordinating discharge planning if the patient needs post-hospital services based on physician/advanced practitioner order or complex needs related to functional status, cognitive ability, or social support system  Outcome: Progressing     Problem: Knowledge Deficit  Goal: Patient/family/caregiver demonstrates understanding of disease process, treatment plan, medications, and discharge instructions  Description: Complete learning assessment and assess knowledge base.  Interventions:  - Provide teaching at level of understanding  - Provide teaching via preferred learning methods  Outcome: Progressing     Problem: SELF HARM/SUICIDALITY  Goal: Will have no self-injury during hospital stay  Description: INTERVENTIONS:  - Q 15 MINUTES: Routine safety checks  - Q WAKING SHIFT & PRN: Assess risk to determine if routine checks are adequate to maintain patient safety  - Encourage patient to participate actively in care by formulating a plan to combat response to suicidal ideation, identify supports and resources  Outcome: Progressing     Problem: DEPRESSION  Goal: Will be euthymic at discharge  Description: INTERVENTIONS:  - Administer medication as ordered  - Provide emotional support via 1:1 interaction with staff  - Encourage involvement in milieu/groups/activities  - Monitor for social isolation  Outcome: Progressing     Problem: ANXIETY  Goal: Will report anxiety at manageable levels  Description: INTERVENTIONS:  - Administer medication as ordered  - Teach and encourage coping skills  -  Provide emotional support  - Assess patient/family for anxiety and ability to cope  Outcome: Progressing  Goal: By discharge: Patient will verbalize 2 strategies to deal with anxiety  Description: Interventions:  - Identify any obvious source/trigger to anxiety  - Staff will assist patient in applying identified coping technique/skills  - Encourage attendance of scheduled groups and activities  Outcome: Progressing

## 2025-05-29 PROCEDURE — 99232 SBSQ HOSP IP/OBS MODERATE 35: CPT | Performed by: STUDENT IN AN ORGANIZED HEALTH CARE EDUCATION/TRAINING PROGRAM

## 2025-05-29 RX ADMIN — NICOTINE POLACRILEX 2 MG: 4 GUM, CHEWING BUCCAL at 09:04

## 2025-05-29 RX ADMIN — GABAPENTIN 300 MG: 300 CAPSULE ORAL at 21:19

## 2025-05-29 RX ADMIN — NICOTINE POLACRILEX 2 MG: 4 GUM, CHEWING BUCCAL at 19:29

## 2025-05-29 RX ADMIN — OLANZAPINE 7.5 MG: 5 TABLET, FILM COATED ORAL at 21:19

## 2025-05-29 RX ADMIN — NICOTINE POLACRILEX 2 MG: 4 GUM, CHEWING BUCCAL at 12:43

## 2025-05-29 RX ADMIN — TRAZODONE HYDROCHLORIDE 50 MG: 50 TABLET ORAL at 21:43

## 2025-05-29 RX ADMIN — LITHIUM CARBONATE 600 MG: 300 TABLET, EXTENDED RELEASE ORAL at 21:19

## 2025-05-29 RX ADMIN — GABAPENTIN 300 MG: 300 CAPSULE ORAL at 16:38

## 2025-05-29 RX ADMIN — GABAPENTIN 300 MG: 300 CAPSULE ORAL at 08:53

## 2025-05-29 NOTE — PLAN OF CARE
Problem: DISCHARGE PLANNING  Goal: Discharge to home or other facility with appropriate resources  Description: INTERVENTIONS:  - Identify barriers to discharge w/patient and caregiver  - Arrange for needed discharge resources and transportation as appropriate  - Identify discharge learning needs (meds, wound care, etc.)  - Arrange for interpretive services to assist at discharge as needed  - Refer to Case Management Department for coordinating discharge planning if the patient needs post-hospital services based on physician/advanced practitioner order or complex needs related to functional status, cognitive ability, or social support system  Outcome: Adequate for Discharge  Note: Pt is scheduled to discharge tomorrow to return to her residence with a referral to [facility] and [facility] for [facility].

## 2025-05-29 NOTE — DISCHARGE INSTR - OTHER ORDERS
[facility] Crisis Information    Crisis Intervention services are available 24 hours a day by calling [phone]. The crisis unit is located at [address] [place] [address]. Services include telephone counseling, mobile crisis, walk-in crisis, and assistance in accessing inpatient care and short-term crisis residential services.    The PEER LINE is a toll-free phone number for people in [facility] who are seeking a listening ear for additional support in their recovery from mental illness. The PEER LINE is peer-run and peer-friendly. Callers to the PEER LINE will speak directly to other individuals who have experienced the mental health recovery process.  Hours of operation: 8:30 am - 4:30 pm, Monday through Friday   [phone] ([phone])   [facility]  [address]  [address]  [place] [address]    Text CONNECT to [phone] from anywhere in the [place], anytime, about any type of crisis.  A live, trained Crisis Counselor receives the text and lets you know that they are here to listen.  The volunteer Crisis Counselor will help you move from a hot moment to a cool moment.    Warm Line: [phone], [phone], [phone]  If it is not quite a crisis, but you want to talk to someone, 24 hours/day, 7 days/week:  Someone to listen; someone who cares.    The National [place] on Mental Illness (SANCHEZ) offers various education & support groups for you & your family.  For more information visit their website at   http://www.sanchez-lv.org/.    Dial 2-1-1 to get connected/get help.  Free, confidential information & referral available 24/7: Aging Services, Child & Youth Services, Counseling, Education/Training, Food/Shelter/Clothing, Health Services, Parenting, Substance Abuse, Support Groups, Volunteer Opportunities, & much more.  Phone: 2-1-1 or [phone], Web: [email or web address], Email: [email or web address]    The Drop-In Centers are open to all mental health consumers in  [facility] who are interested in meeting people and making new friends. They provide a friendly social atmosphere with scheduled daily activities including games, arts & crafts, discussion and education groups, vocational activities, and much more. Light refreshments are served daily. The locations are:    [facility] Drop-In Center - operated by [employer]   [address] [address]   Phone: [phone]   Fax: [phone]   E-mail: [email or web address]  Hours of Operation:  Monday 3:00 PM - 8:00 PM  Tuesday 12:00 PM - 8:00 PM   Wednesday 3:00 PM - 8:00 PM  Thursday 12:00 PM - 8:00 PM   Friday 3:00 PM - 8:00 PM   Saturday [facility] Drop-In Center - operated by [employer]  [address] [place] [address]  Phone: [phone]  Fax: [phone]  Hours of Operation  Monday-Friday 10:00 AM - 6:00 PM  Van transportation is available on scheduled days for transportation.    Psych Rehab Program:  Modeled after the Person House program in [facility], the RadPad movement offers consumers interested in fulfilling work a guaranteed place to come, to belong, and to enjoy meaningful relationships as they seek the confidence and skills necessary to lead vocationally productive and socially satisfying lives. Here is their contact information:  [facility] Psychiatric Rehabilitation Program  [address] [address]  Phone: [phone]  [email or web address].  This site is open Monday thru Thursday from 8:30 am till 4:00 pm and Fridays from 8:30 am till 3:00 pm. Consumers are encouraged to stop by for a visit. After-hour social activities are also scheduled.    Support & Referral Helpline  Support and Referral Helpline is a 24-hour, 7 days-a-week, listening and referral service provided to all [facility].  Please call [phone] or tty [phone] to speak with one of our specialists.  The helpline is possible through  partnerships with the [place] Department of Human Services and [employer].    The 988 Suicide & Crisis Lifeline (formerly known as the National Suicide Prevention Lifeline) provides free and confidential emotional support to people in suicidal crisis or emotional distress 24 hours a day, 7 days a week, across the [place]. The Lifeline is comprised of a national network of over 200 local crisis centers, combining custom local care and resources with national standards and best practices.

## 2025-05-29 NOTE — NURSING NOTE
Pt bright upon approach. Pt reports mild depression d/t missing her son. Pt feels ready for upcoming discharge. Pt remains visible on the unit and social with peers. Denies SI/HI/AVH. Pt reports a good appetite and good sleep over night. Pt attends groups. Pt has been journaling and taking notes during groups. Denies unmet needs at this time.

## 2025-05-29 NOTE — DISCHARGE INSTR - APPOINTMENTS
You have confirmed and will be discharged to [address] [address].  You have confirmed and will be contacted at phone number [phone].  Your  while you were at [facility] was Homero MOYA who can be reached at phone number [phone] and fax number [phone].    Behavioral Health Nurse Navigator, La or Katarina will be calling you after your discharge, on the phone number that you provided.  They will be available as an additional support, if needed.   If you wish to speak with La, you may contact her at [phone].

## 2025-05-29 NOTE — PROGRESS NOTES
05/29/25 0750   Team Meeting   Meeting Type Daily Rounds   Team Members Present   Team Members Present Physician;Nurse;;Other (Discipline and Name)   Physician Team Member Dr. Ledbetter / YOLANDA Hunter / ROSANNE Meredith / [place] Student   Nursing Team Member Lauro / Aakash   Care Management Team Member Shani / Ying / Cody   Other (Discipline and Name) Vito (Group Facilitator)   Patient/Family Present   Patient Present No   Patient's Family Present No     Treatment Team Rounds Completed  Medical and Psychiatric Review Completed  D/C: Denying SI HI AVH, positive thinking, discharge for tomorrow

## 2025-05-29 NOTE — NURSING NOTE
Pt calm and cooperative. Thankful to be discharging tomorrow. Social with peers and attended group this morning. Denies SI/HI or hallucination.

## 2025-05-29 NOTE — NURSING NOTE
Pt showered this morning. Bright and pleasant during interaction. Denies SI/HI or hallucinations. Compliant with medication. Verbalized feeling ready to discharge tomorrow. Feels improved. Stated learning coping skills and needed treatment. Thankful for treatment.
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
## 2025-05-29 NOTE — NURSING NOTE
@ 2125 pt requested and received trazodone 50 mg PO for sleep. Upon follow up pt is awake. PRN not effective at this time.

## 2025-05-29 NOTE — CASE MANAGEMENT
CM met with the Pt to discuss discharge planning for tomorrow. CM stated that they can [facility] the Pt [facility] but can also refer her to [facility] for [facility] funding. Pt agreeable and signed BENNETT for [facility] and [facility]. Pt confirmed that she is going back to her residence in [place] and her sister will pick her up around 10:30am. CM stated that they will not be here tomorrow but will return on Monday and their information will be in there discharge paperwork if she has questions. Pt verbalized understanding of discharge plan and readiness for discharge. She confirmed that her son is with his dad and she will be picking him up.     CM submitted order for - for [facility].     CM called [facility] @[phone] to refer the Pt for [facility] funding. CM provided the Pt's information and they stated that they will give her a call.     RAFY called the Pt's olivermobobbi Ruiz @[phone] to discuss discharge planning. RAFY left a voicemail.

## 2025-05-30 ENCOUNTER — TELEPHONE (OUTPATIENT)
Age: 25
End: 2025-05-30

## 2025-05-30 VITALS
RESPIRATION RATE: 16 BRPM | WEIGHT: 118.4 LBS | HEIGHT: 65 IN | OXYGEN SATURATION: 100 % | TEMPERATURE: 99.1 F | BODY MASS INDEX: 19.73 KG/M2 | HEART RATE: 87 BPM | SYSTOLIC BLOOD PRESSURE: 120 MMHG | DIASTOLIC BLOOD PRESSURE: 81 MMHG

## 2025-05-30 LAB — LITHIUM SERPL-SCNC: 0.58 MMOL/L (ref 0.6–1.2)

## 2025-05-30 PROCEDURE — 99239 HOSP IP/OBS DSCHRG MGMT >30: CPT | Performed by: STUDENT IN AN ORGANIZED HEALTH CARE EDUCATION/TRAINING PROGRAM

## 2025-05-30 PROCEDURE — 80178 ASSAY OF LITHIUM: CPT | Performed by: STUDENT IN AN ORGANIZED HEALTH CARE EDUCATION/TRAINING PROGRAM

## 2025-05-30 RX ORDER — OLANZAPINE 7.5 MG/1
7.5 TABLET, FILM COATED ORAL
Qty: 30 TABLET | Refills: 0 | Status: SHIPPED | OUTPATIENT
Start: 2025-05-30 | End: 2025-06-29

## 2025-05-30 RX ORDER — GABAPENTIN 300 MG/1
300 CAPSULE ORAL 3 TIMES DAILY
Qty: 90 CAPSULE | Refills: 0 | Status: SHIPPED | OUTPATIENT
Start: 2025-05-30 | End: 2025-06-29

## 2025-05-30 RX ORDER — LITHIUM CARBONATE 300 MG/1
600 TABLET, FILM COATED, EXTENDED RELEASE ORAL
Qty: 60 TABLET | Refills: 0 | Status: SHIPPED | OUTPATIENT
Start: 2025-05-30

## 2025-05-30 RX ADMIN — GABAPENTIN 300 MG: 300 CAPSULE ORAL at 08:48

## 2025-05-30 RX ADMIN — NICOTINE POLACRILEX 2 MG: 4 GUM, CHEWING BUCCAL at 08:51

## 2025-05-30 NOTE — NURSING NOTE
AVS reviewed with pt. Belongings packed with staff, all personal belongings accounted for. Medications sent via e-prescribe. Pt expresses readiness for discharge. Transport provided by friend.

## 2025-05-30 NOTE — NURSING NOTE
Pt approached staff for Prn related to insomnia. Pt was given Trazodone 50 mg at 2143.    Upon reassessment, pt is sleeping in room with unlabored breathing. Medication seems to been effective.
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
## 2025-05-30 NOTE — NURSING NOTE
Pt is awake, alert, expressing readiness to discharge today. Writer reviewed typical day of discharge process with pt. Pt denies SI, HI, AVH. Pt excited to get home to 6yo son. Pt states that someone is providing transport this morning, between [phone]. Pt was provided with nurse's station phone number to provide to them for when they arrive to pickup pt. Pt showered and completed ADLs this morning.

## 2025-05-30 NOTE — PROGRESS NOTES
05/30/25 0756   Team Meeting   Meeting Type Daily Rounds   Team Members Present   Team Members Present Physician;Nurse;;Other (Discipline and Name)   Physician Team Member Dr. Benson / Dr. Ledbetter / YOLANDA Hunter / [place] Student   Nursing Team Member Rama   Care Management Team Member Cody / Ying   Other (Discipline and Name) Sigmund - Group Facilitator   Patient/Family Present   Patient Present No   Patient's Family Present No     Pt reported feeling improved and ready to dc home today.     DC: Today

## 2025-05-30 NOTE — TELEPHONE ENCOUNTER
----- Message from Jenn MALIK sent at 5/30/2025  2:36 PM EDT -----  Regarding: HDC  Hi,We have  a hold on NP/DIPAK/HDC for all Eric providers, please reschedule with another provider.

## 2025-05-30 NOTE — PLAN OF CARE
Problem: DISCHARGE PLANNING  Goal: Discharge to home or other facility with appropriate resources  Description: INTERVENTIONS:  - Identify barriers to discharge w/patient and caregiver  - Arrange for needed discharge resources and transportation as appropriate  - Identify discharge learning needs (meds, wound care, etc.)  - Arrange for interpretive services to assist at discharge as needed  - Refer to Case Management Department for coordinating discharge planning if the patient needs post-hospital services based on physician/advanced practitioner order or complex needs related to functional status, cognitive ability, or social support system  Outcome: Progressing     Problem: Knowledge Deficit  Goal: Patient/family/caregiver demonstrates understanding of disease process, treatment plan, medications, and discharge instructions  Description: Complete learning assessment and assess knowledge base.  Interventions:  - Provide teaching at level of understanding  - Provide teaching via preferred learning methods  Outcome: Progressing     Problem: SELF HARM/SUICIDALITY  Goal: Will have no self-injury during hospital stay  Description: INTERVENTIONS:  - Q 15 MINUTES: Routine safety checks  - Q WAKING SHIFT & PRN: Assess risk to determine if routine checks are adequate to maintain patient safety  - Encourage patient to participate actively in care by formulating a plan to combat response to suicidal ideation, identify supports and resources  Outcome: Progressing     Problem: DEPRESSION  Goal: Will be euthymic at discharge  Description: INTERVENTIONS:  - Administer medication as ordered  - Provide emotional support via 1:1 interaction with staff  - Encourage involvement in milieu/groups/activities  - Monitor for social isolation  Outcome: Progressing     Problem: ANXIETY  Goal: Will report anxiety at manageable levels  Description: INTERVENTIONS:  - Administer medication as ordered  - Teach and encourage coping skills  -  Provide emotional support  - Assess patient/family for anxiety and ability to cope  Outcome: Progressing  Goal: By discharge: Patient will verbalize 2 strategies to deal with anxiety  Description: Interventions:  - Identify any obvious source/trigger to anxiety  - Staff will assist patient in applying identified coping technique/skills  - Encourage attendance of scheduled groups and activities  Outcome: Progressing     Problem: Ineffective Coping  Goal: Participates in unit activities  Description: Interventions:  - Provide therapeutic environment   - Provide required programming   - Redirect inappropriate behaviors   Outcome: Progressing

## 2025-05-30 NOTE — BH TRANSITION RECORD
Contact Information: If you have any questions, concerns, pended studies, tests and/or procedures, or emergencies regarding your inpatient behavioral health visit. Please contact [place] behavioral health [facility] [phone] and ask to speak to a , nurse or physician. A contact is available 24 hours/ 7 days a week at this number.     Summary of Procedures Performed During your Stay:  Below is a list of major procedures performed during your hospital stay and a summary of results:  - No major procedures performed.    Pending Studies (From admission, onward)       Start     Ordered    05/30/25 0600  Lithium level  Morning draw         05/27/25 1006                  Please follow up on the above pending studies with your PCP and/or referring provider.

## 2025-05-30 NOTE — TELEPHONE ENCOUNTER
Pt r/s with Ruba Conner on 07/08/2025.  [facility] with appt details sent to CM.

## 2025-05-30 NOTE — PLAN OF CARE
Problem: DISCHARGE PLANNING  Goal: Discharge to home or other facility with appropriate resources  Description: INTERVENTIONS:  - Identify barriers to discharge w/patient and caregiver  - Arrange for needed discharge resources and transportation as appropriate  - Identify discharge learning needs (meds, wound care, etc.)  - Arrange for interpretive services to assist at discharge as needed  - Refer to Case Management Department for coordinating discharge planning if the patient needs post-hospital services based on physician/advanced practitioner order or complex needs related to functional status, cognitive ability, or social support system  Outcome: Adequate for Discharge     Problem: Knowledge Deficit  Goal: Patient/family/caregiver demonstrates understanding of disease process, treatment plan, medications, and discharge instructions  Description: Complete learning assessment and assess knowledge base.  Interventions:  - Provide teaching at level of understanding  - Provide teaching via preferred learning methods  Outcome: Adequate for Discharge     Problem: SELF HARM/SUICIDALITY  Goal: Will have no self-injury during hospital stay  Description: INTERVENTIONS:  - Q 15 MINUTES: Routine safety checks  - Q WAKING SHIFT & PRN: Assess risk to determine if routine checks are adequate to maintain patient safety  - Encourage patient to participate actively in care by formulating a plan to combat response to suicidal ideation, identify supports and resources  Outcome: Adequate for Discharge     Problem: DEPRESSION  Goal: Will be euthymic at discharge  Description: INTERVENTIONS:  - Administer medication as ordered  - Provide emotional support via 1:1 interaction with staff  - Encourage involvement in milieu/groups/activities  - Monitor for social isolation  Outcome: Adequate for Discharge     Problem: ANXIETY  Goal: Will report anxiety at manageable levels  Description: INTERVENTIONS:  - Administer medication as  ordered  - Teach and encourage coping skills  - Provide emotional support  - Assess patient/family for anxiety and ability to cope  Outcome: Adequate for Discharge  Goal: By discharge: Patient will verbalize 2 strategies to deal with anxiety  Description: Interventions:  - Identify any obvious source/trigger to anxiety  - Staff will assist patient in applying identified coping technique/skills  - Encourage attendance of scheduled groups and activities  Outcome: Adequate for Discharge     Problem: Ineffective Coping  Goal: Participates in unit activities  Description: Interventions:  - Provide therapeutic environment   - Provide required programming   - Redirect inappropriate behaviors   Outcome: Adequate for Discharge
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
## 2025-06-06 ENCOUNTER — TELEPHONE (OUTPATIENT)
Dept: PSYCHIATRY | Facility: CLINIC | Age: 25
End: 2025-06-06

## 2025-06-06 NOTE — TELEPHONE ENCOUNTER
Called and spoke to patient about needing to reschedule NP talk therapy appointment as original provider is no longer accepting new patients. Patient started explaining how they are having some issues with insurance is unsure whether they will have insurance or not. Patient was also busy at time of call, requested for writer to just reschedule the appointment and send mychart message with new appointment date/time. Patient will call back and reschedule if that date/time doesn't work for them.    Rescheduled NP appointment for 6/19/25 at 9am with Ned COLON

## 2025-06-12 ENCOUNTER — TELEPHONE (OUTPATIENT)
Dept: PSYCHIATRY | Facility: CLINIC | Age: 25
End: 2025-06-12

## 2025-06-12 NOTE — TELEPHONE ENCOUNTER
One week follow up call for New Patient appointment with Ned Traore LCSW  on 06/19/2025 was made on 06/12/2025. Writer informed patient of New Patient paperwork needing to be completed 5 days prior to the appointment. Writer confirmed paperwork has been sent via [employer].    Appointment was made on: 06/06/2025

## 2025-06-17 ENCOUNTER — TELEPHONE (OUTPATIENT)
Dept: PSYCHIATRY | Facility: CLINIC | Age: 25
End: 2025-06-17

## 2025-06-17 NOTE — TELEPHONE ENCOUNTER
LVM to inform insurance on file termed as of 5/31/2025. Self pay created until PT provides new insurance.

## 2025-06-19 ENCOUNTER — TELEPHONE (OUTPATIENT)
Dept: PSYCHIATRY | Facility: CLINIC | Age: 25
End: 2025-06-19

## 2025-06-19 ENCOUNTER — OFFICE VISIT (OUTPATIENT)
Dept: BEHAVIORAL/MENTAL HEALTH CLINIC | Facility: CLINIC | Age: 25
End: 2025-06-19

## 2025-06-19 DIAGNOSIS — F32.9 MAJOR DEPRESSIVE DISORDER WITH CURRENT ACTIVE EPISODE, UNSPECIFIED DEPRESSION EPISODE SEVERITY, UNSPECIFIED WHETHER RECURRENT: ICD-10-CM

## 2025-06-19 DIAGNOSIS — Z91.199 NO-SHOW FOR APPOINTMENT: ICD-10-CM

## 2025-06-19 DIAGNOSIS — F31.2 BIPOLAR AFFECTIVE DISORDER, CURRENTLY MANIC, SEVERE, WITH PSYCHOTIC FEATURES (HCC): Primary | Chronic | ICD-10-CM

## 2025-06-19 PROCEDURE — NOSHOW

## 2025-06-19 NOTE — LETTER
25     Evonne Seth   : 2000   [address]   [address]  [place] [address]       It is the policy of [facility] to monitor and manage appointments that have been no-showed or cancelled with less than 48-hour notice. This is necessary to ensure that we are able to provide timely access for all patients to our providers. Undue numbers of unutilized appointments delays necessary medical care for all patients.      Dear Evonne Seth:      We are sorry that you missed your appointment with Ned Traore LCSW on 2025. Your health and follow-up care are important to us. As this was a New Patient appointment, you will need to contact the office at [phone] if you would like to reschedule.    Please be aware that our office policy states that if you 'no show' two or more Therapy appointments without prior notice of cancellation within in a calendar year, you may be discharged from Therapy treatment.  We want to bring this to your attention now to prevent an interruption of your care.  If you have any questions about this policy, please call us at the number above.     If we do not hear from you within 10 business days to make a follow up appointment, we will assume you are no longer interested in care here.    Thank you in advance for your cooperation and assistance.       Sincerely,      [facility] Support Staff

## 2025-06-19 NOTE — PSYCH
No Call. No Show. Charge    Evonne Seth no showed 06/19/25 appointment , staff called and left message to reschedule appointment     Treatment Plan not due at this session.

## 2025-06-19 NOTE — TELEPHONE ENCOUNTER
NO-SHOW LETTER MAILED TO Evonne Seth.  ADDRESS: [address] [address]  SENT VIA [employer]

## 2025-06-28 ENCOUNTER — HOSPITAL ENCOUNTER (EMERGENCY)
Facility: HOSPITAL | Age: 25
Discharge: HOME | End: 2025-06-28
Attending: EMERGENCY MEDICINE
Payer: COMMERCIAL

## 2025-06-28 VITALS
SYSTOLIC BLOOD PRESSURE: 127 MMHG | OXYGEN SATURATION: 100 % | TEMPERATURE: 98 F | HEART RATE: 77 BPM | DIASTOLIC BLOOD PRESSURE: 83 MMHG | RESPIRATION RATE: 16 BRPM

## 2025-06-28 DIAGNOSIS — K04.7 DENTAL ABSCESS: Primary | ICD-10-CM

## 2025-06-28 DIAGNOSIS — K08.89 PAIN, DENTAL: ICD-10-CM

## 2025-06-28 PROCEDURE — 99284 EMERGENCY DEPT VISIT MOD MDM: CPT | Performed by: EMERGENCY MEDICINE

## 2025-06-28 PROCEDURE — 99282 EMERGENCY DEPT VISIT SF MDM: CPT

## 2025-06-28 RX ORDER — NAPROXEN 500 MG/1
500 TABLET ORAL 2 TIMES DAILY WITH MEALS
Qty: 20 TABLET | Refills: 0 | Status: SHIPPED | OUTPATIENT
Start: 2025-06-28 | End: 2025-06-28

## 2025-06-28 RX ORDER — NAPROXEN 500 MG/1
500 TABLET ORAL 2 TIMES DAILY WITH MEALS
Qty: 20 TABLET | Refills: 0 | Status: SHIPPED | OUTPATIENT
Start: 2025-06-28 | End: 2025-07-08

## 2025-06-28 RX ORDER — NAPROXEN 250 MG/1
500 TABLET ORAL ONCE
Status: COMPLETED | OUTPATIENT
Start: 2025-06-28 | End: 2025-06-28

## 2025-06-28 RX ADMIN — AMOXICILLIN AND CLAVULANATE POTASSIUM 1 TABLET: 875; 125 TABLET, FILM COATED ORAL at 20:49

## 2025-06-28 RX ADMIN — NAPROXEN 500 MG: 250 TABLET ORAL at 20:50

## 2025-06-29 NOTE — ED PROVIDER NOTES
Time reflects when diagnosis was documented in both MDM as applicable and the Disposition within this note       Time User Action Codes Description Comment    6/28/2025  8:48 PM Ngozi Cesar Add [K04.7] Dental abscess     6/28/2025  8:48 PM Ngozi Cesar Add [K08.89] Pain, dental           ED Disposition       ED Disposition   Discharge    Condition   Stable    Date/Time   Sat Jun 28, 2025  8:48 PM    Comment   Evnone Seth discharge to home/self care.                   Assessment & Plan       Medical Decision Making  Differential diagnosis includes but is not limited to dental abscess, dental infection, dental pain, dental caries, poor dentition    Problems Addressed:  Dental abscess: acute illness or injury  Pain, dental: acute illness or injury    Risk  OTC drugs.  Prescription drug management.  Risk Details: May take Tylenol over-the-counter given prescription for antibiotics and naproxen.  Patient states she already has follow-up with             Medications   naproxen (NAPROSYN) tablet 500 mg (500 mg Oral Given 6/28/25 2050)   amoxicillin-clavulanate (AUGMENTIN) 875-125 mg per tablet 1 tablet (1 tablet Oral Given 6/28/25 2049)       ED Risk Strat Scores                    No data recorded                            History of Present Illness       Chief Complaint   Patient presents with    Dental Pain     Pt arrives to the with complaint of dental pain for the past 3 days. Pt states to have a dentist appointment at the end of July. Pt using oragel and left over antibiotics at home.        Past Medical History[1]   Past Surgical History[2]   Family History[3]   Social History[4]   E-Cigarette/Vaping    E-Cigarette Use Current Some Day User       E-Cigarette/Vaping Substances    Nicotine Yes     THC No     CBD No     Flavoring No     Other No     Unknown No       I have reviewed and agree with the history as documented.     24-year-old female comes in for evaluation of dental pain.  States pain  started approximately 3 days ago tried taking leftover antibiotics and Orajel at home without relief.  States cannot get into dentist until July      History provided by:  Patient   used: No    Dental Pain  Location:  Lower  Lower teeth location:  19/LL 1st molar  Quality:  Constant and throbbing  Severity:  Severe  Onset quality:  Gradual  Timing:  Constant  Progression:  Worsening  Chronicity:  New  Context: abscess, dental caries and poor dentition    Ineffective treatments:  Topical anesthetic gel  Associated symptoms: facial pain and gum swelling    Associated symptoms: no fever and no neck swelling    Risk factors: periodontal disease    Risk factors: no alcohol problem and no immunosuppression        Review of Systems   Constitutional:  Negative for chills and fever.   HENT:  Negative for ear pain and sore throat.    Eyes:  Negative for pain and visual disturbance.   Respiratory:  Negative for cough and shortness of breath.    Cardiovascular:  Negative for chest pain and palpitations.   Gastrointestinal:  Negative for abdominal pain and vomiting.   Genitourinary:  Negative for dysuria and hematuria.   Musculoskeletal:  Negative for arthralgias and back pain.   Skin:  Negative for color change and rash.   Neurological:  Negative for seizures and syncope.   All other systems reviewed and are negative.          Objective       ED Triage Vitals [06/28/25 2044]   Temperature Pulse Blood Pressure Respirations SpO2 Patient Position - Orthostatic VS   98 °F (36.7 °C) 77 127/83 16 100 % Lying      Temp Source Heart Rate Source BP Location FiO2 (%) Pain Score    Oral -- Right arm -- 8      Vitals      Date and Time Temp Pulse SpO2 Resp BP Pain Score FACES Pain Rating User   06/28/25 2044 98 °F (36.7 °C) 77 100 % 16 127/83 8 -- PETER            Physical Exam  HENT:      Mouth/Throat:      Dentition: Abnormal dentition. Gingival swelling and dental abscesses present.           Results Reviewed       None             No orders to display       Procedures    ED Medication and Procedure Management   Prior to Admission Medications   Prescriptions Last Dose Informant Patient Reported? Taking?   OLANZapine (ZyPREXA) 7.5 mg tablet   No No   Sig: Take 1 tablet (7.5 mg total) by mouth daily at bedtime   gabapentin (NEURONTIN) 300 mg capsule   No No   Sig: Take 1 capsule (300 mg total) by mouth 3 (three) times a day   lithium carbonate (LITHOBID) 300 mg CR tablet   No No   Sig: Take 2 tablets (600 mg total) by mouth daily at bedtime      Facility-Administered Medications: None     Current Discharge Medication List        START taking these medications    Details   amoxicillin-clavulanate (AUGMENTIN) 875-125 mg per tablet Take 1 tablet by mouth every 12 (twelve) hours for 7 days  Qty: 14 tablet, Refills: 0    Associated Diagnoses: Dental abscess      naproxen (NAPROSYN) 500 mg tablet Take 1 tablet (500 mg total) by mouth 2 (two) times a day with meals for 10 days  Qty: 20 tablet, Refills: 0    Associated Diagnoses: Dental abscess           CONTINUE these medications which have NOT CHANGED    Details   gabapentin (NEURONTIN) 300 mg capsule Take 1 capsule (300 mg total) by mouth 3 (three) times a day  Qty: 90 capsule, Refills: 0    Associated Diagnoses: Bipolar affective disorder, currently manic, severe, with psychotic features (HCC)      lithium carbonate (LITHOBID) 300 mg CR tablet Take 2 tablets (600 mg total) by mouth daily at bedtime  Qty: 60 tablet, Refills: 0    Associated Diagnoses: Bipolar affective disorder, currently manic, severe, with psychotic features (HCC)      OLANZapine (ZyPREXA) 7.5 mg tablet Take 1 tablet (7.5 mg total) by mouth daily at bedtime  Qty: 30 tablet, Refills: 0    Associated Diagnoses: Bipolar affective disorder, currently manic, severe, with psychotic features (HCC)           No discharge procedures on file.  ED SEPSIS DOCUMENTATION   Time reflects when diagnosis was documented in both MDM as  applicable and the Disposition within this note       Time User Action Codes Description Comment    2025  8:48 PM Ngozi Cesar Add [K04.7] Dental abscess     2025  8:48 PM Ngozi Cesar Add [K08.89] Pain, dental                    [1]   Past Medical History:  Diagnosis Date    Anxiety     Asthma     Bipolar 1 disorder (HCC)     Depression    [2] No past surgical history on file.  [3] No family history on file.  [4]   Social History  Tobacco Use    Smoking status: Former     Current packs/day: 0.00     Average packs/day: 0.3 packs/day for 1.4 years (0.4 ttl pk-yrs)     Types: Cigarettes     Start date: 2022     Quit date: 2024     Years since quittin.0    Smokeless tobacco: Never   Vaping Use    Vaping status: Some Days    Substances: Nicotine   Substance Use Topics    Alcohol use: Not Currently     Comment: socially    Drug use: Yes     Frequency: 1.0 times per week     Types: Marijuana     Comment: last used         Ngozi Cesar DO  25

## 2025-07-02 ENCOUNTER — TELEPHONE (OUTPATIENT)
Age: 25
End: 2025-07-02

## 2025-07-02 NOTE — TELEPHONE ENCOUNTER
Patient calling in to reschedule [facility] appt on 7/8 at 4pm as patient states that afternoon appointments don't work for her. Patient states she wants the [place] location in the morning, first available was August 28th at 10am. Patient states she will keep her current scheduled appointment and try to make it work.     Patient asked if she could seek services elsewhere or if she had to go through [facility] or have her PCP handle her medications. Made patient aware she is able to seek care wherever she chooses as well as speak to her PCP to see if they are okay with handling any psychiatric medications. Patient verbalized understanding.

## 2025-07-07 NOTE — TELEPHONE ENCOUNTER
The patient called to reschedule the [facility] appointment for 7.8.25.  At this time, the call was transferred to Intake for further scheduling assistance.

## 2025-07-07 NOTE — TELEPHONE ENCOUNTER
Received transfer from [facility] regarding a need to cancel [facility] appt on 7/08 at 16:00. Patient rescheduled for [facility] appt 8/29 09:00.

## 2025-07-10 ENCOUNTER — TELEPHONE (OUTPATIENT)
Dept: PSYCHIATRY | Facility: CLINIC | Age: 25
End: 2025-07-10

## 2025-07-10 NOTE — TELEPHONE ENCOUNTER
A no records statement was sent by O to [facility] for a medical record for date of 7/8/25.     The appointment was cancelled by the patient.

## 2025-07-14 ENCOUNTER — TELEPHONE (OUTPATIENT)
Dept: PSYCHIATRY | Facility: CLINIC | Age: 25
End: 2025-07-14

## 2025-07-14 NOTE — TELEPHONE ENCOUNTER
One week follow up call for New Patient appointment with   Ruba Conner PA-C   on 08/29/2025 was made on 07/14/2025. Writer was not able to inform patient of New Patient paperwork needing to be completed 5 days prior to the appointment. Writer was not able to  confirm that paperwork has been sent via [employer].    Appointment was made on: 07/07/2025

## 2025-08-01 ENCOUNTER — TELEPHONE (OUTPATIENT)
Age: 25
End: 2025-08-01

## 2025-08-04 ENCOUNTER — HOSPITAL ENCOUNTER (EMERGENCY)
Facility: HOSPITAL | Age: 25
Discharge: HOME | End: 2025-08-04
Attending: EMERGENCY MEDICINE
Payer: COMMERCIAL

## 2025-08-04 VITALS
DIASTOLIC BLOOD PRESSURE: 70 MMHG | HEART RATE: 73 BPM | SYSTOLIC BLOOD PRESSURE: 144 MMHG | RESPIRATION RATE: 16 BRPM | OXYGEN SATURATION: 99 % | TEMPERATURE: 98.2 F

## 2025-08-04 DIAGNOSIS — K08.89 PAIN, DENTAL: Primary | ICD-10-CM

## 2025-08-04 PROCEDURE — 99284 EMERGENCY DEPT VISIT MOD MDM: CPT | Performed by: EMERGENCY MEDICINE

## 2025-08-04 PROCEDURE — 99282 EMERGENCY DEPT VISIT SF MDM: CPT

## 2025-08-04 RX ORDER — NAPROXEN 375 MG/1
375 TABLET ORAL EVERY 12 HOURS PRN
Qty: 14 TABLET | Refills: 0 | Status: SHIPPED | OUTPATIENT
Start: 2025-08-04 | End: 2025-08-14

## 2025-08-04 RX ORDER — NAPROXEN 250 MG/1
375 TABLET ORAL ONCE
Status: COMPLETED | OUTPATIENT
Start: 2025-08-04 | End: 2025-08-04

## 2025-08-04 RX ORDER — PENICILLIN V POTASSIUM 250 MG/1
500 TABLET, FILM COATED ORAL ONCE
Status: COMPLETED | OUTPATIENT
Start: 2025-08-04 | End: 2025-08-04

## 2025-08-04 RX ORDER — PENICILLIN V POTASSIUM 500 MG/1
500 TABLET, FILM COATED ORAL 3 TIMES DAILY
Qty: 20 TABLET | Refills: 0 | Status: SHIPPED | OUTPATIENT
Start: 2025-08-05 | End: 2025-08-12

## 2025-08-04 RX ADMIN — NAPROXEN 375 MG: 250 TABLET ORAL at 04:18

## 2025-08-04 RX ADMIN — PENICILLIN V POTASSIUM 500 MG: 250 TABLET, FILM COATED ORAL at 04:18
